# Patient Record
Sex: FEMALE | Race: WHITE | NOT HISPANIC OR LATINO | Employment: FULL TIME | URBAN - METROPOLITAN AREA
[De-identification: names, ages, dates, MRNs, and addresses within clinical notes are randomized per-mention and may not be internally consistent; named-entity substitution may affect disease eponyms.]

---

## 2016-06-20 LAB — EXTERNAL HIV SCREEN: NORMAL

## 2018-03-04 ENCOUNTER — APPOINTMENT (EMERGENCY)
Dept: RADIOLOGY | [place] HOSPITAL | Age: 53
End: 2018-03-04
Payer: COMMERCIAL

## 2018-03-04 ENCOUNTER — HOSPITAL ENCOUNTER (EMERGENCY)
[place] HOSPITAL | Age: 53
Discharge: HOME | End: 2018-03-04
Attending: EMERGENCY MEDICINE | Admitting: EMERGENCY MEDICINE
Payer: COMMERCIAL

## 2018-03-04 VITALS
BODY MASS INDEX: 24.21 KG/M2 | RESPIRATION RATE: 15 BRPM | TEMPERATURE: 98.1 F | OXYGEN SATURATION: 100 % | WEIGHT: 150 LBS | SYSTOLIC BLOOD PRESSURE: 137 MMHG | DIASTOLIC BLOOD PRESSURE: 86 MMHG | HEART RATE: 98 BPM

## 2018-03-04 DIAGNOSIS — M25.561 POSTERIOR RIGHT KNEE PAIN: Primary | ICD-10-CM

## 2018-03-04 PROCEDURE — 96372 THER/PROPH/DIAG INJ SC/IM: CPT

## 2018-03-04 PROCEDURE — 99283 EMERGENCY DEPT VISIT LOW MDM: CPT

## 2018-03-04 PROCEDURE — 73564 X-RAY EXAM KNEE 4 OR MORE: CPT

## 2018-03-04 RX ORDER — KETOROLAC TROMETHAMINE 30 MG/ML
15 INJECTION, SOLUTION INTRAMUSCULAR; INTRAVENOUS ONCE
Status: COMPLETED | OUTPATIENT
Start: 2018-03-04 | End: 2018-03-04

## 2018-03-04 RX ORDER — NAPROXEN 500 MG/1
500 TABLET ORAL 2 TIMES DAILY WITH MEALS
Qty: 10 TABLET | Refills: 0 | Status: SHIPPED | OUTPATIENT
Start: 2018-03-04 | End: 2018-04-13 | Stop reason: SDUPTHER

## 2018-03-04 RX ADMIN — KETOROLAC TROMETHAMINE 15 MG: 30 INJECTION, SOLUTION INTRAMUSCULAR at 23:39

## 2018-03-05 NOTE — ED PROVIDER NOTES
History  Chief Complaint   Patient presents with    Knee Pain     right posterior knee pain for 1 hour  denies injury  63-year-old female with no significant past history presents for evaluation of right knee pain that started 1 hour ago  Patient reports that she was at the club and was doing a Darian Croissant and when she stretched out her leg she felt as if her knee dislocated  Patient states that she physically had to bring her knee back  States that she has pain over the back of her knee that radiates down into her calf  States that she has been wearing heels and has been under Tippy toes because of the pain  Patient has not taken anything for pain or applied ice  Patient denies a history of surgery or sprain of the knee in the past   Patient states that she is able to walk however is bearing weight on her left leg  She denies any numbness, tingling, swelling, fever, chills  Prior to Admission Medications   Prescriptions Last Dose Informant Patient Reported? Taking?   dicyclomine (BENTYL) 20 mg tablet   No No   Sig: Take 1 tablet by mouth 4 (four) times a day as needed (diarrhea)      Facility-Administered Medications: None       History reviewed  No pertinent past medical history  History reviewed  No pertinent surgical history  History reviewed  No pertinent family history  I have reviewed and agree with the history as documented  Social History   Substance Use Topics    Smoking status: Former Smoker    Smokeless tobacco: Never Used    Alcohol use No        Review of Systems   Constitutional: Negative for chills and fever  Musculoskeletal: Positive for arthralgias and myalgias  Skin: Negative  Neurological: Negative for weakness and numbness         Physical Exam  ED Triage Vitals [03/04/18 2211]   Temperature Pulse Respirations Blood Pressure SpO2   98 1 °F (36 7 °C) 98 15 137/86 100 %      Temp Source Heart Rate Source Patient Position - Orthostatic VS BP Location FiO2 (%)   Oral -- Sitting Right arm --      Pain Score       --           Orthostatic Vital Signs  Vitals:    03/04/18 2211   BP: 137/86   Pulse: 98   Patient Position - Orthostatic VS: Sitting       Physical Exam   Constitutional: She is oriented to person, place, and time  She appears well-developed and well-nourished  She is cooperative  No distress  HENT:   Head: Normocephalic and atraumatic  Neck: Normal range of motion  Neck supple  Cardiovascular: Normal rate and normal heart sounds  No murmur heard  Pulmonary/Chest: Effort normal and breath sounds normal    Musculoskeletal: Normal range of motion  Right knee: She exhibits normal range of motion, no swelling, no ecchymosis, no erythema, normal alignment, no LCL laxity, normal patellar mobility, no bony tenderness, normal meniscus and no MCL laxity  No tenderness found  No medial joint line, no lateral joint line, no MCL, no LCL and no patellar tendon tenderness noted  Legs:  Full active range of motion of right knee, 5/5 strength  Tenderness to palpation over the popliteal fossa extending down into the calf  No swelling, erythema, or bony tenderness noted  No palpable mass  Patient is able to ambulate  Neurological: She is alert and oriented to person, place, and time  Skin: Skin is warm  Capillary refill takes less than 2 seconds  No rash noted  She is not diaphoretic  No erythema  Psychiatric: She has a normal mood and affect         ED Medications  Medications   ketorolac (TORADOL) injection 15 mg (15 mg Intramuscular Given 3/4/18 1213)       Diagnostic Studies  Results Reviewed     None                 XR knee 4+ views Right injury    (Results Pending)              Procedures  Procedures       Phone Contacts  ED Phone Contact    ED Course  ED Course                                MDM  Number of Diagnoses or Management Options  Diagnosis management comments: Well-appearing 66-year-old female presents for evaluation of right knee pain after trying to do a jumping Saderanjan Franks  Patient is well-appearing, vital signs not concerning  Will give her pain medication as well as get an x-ray of her knee to rule out any bony abnormality  Will provide crutches as needed  CritCare Time    Disposition  Final diagnoses:   Posterior right knee pain     Time reflects when diagnosis was documented in both MDM as applicable and the Disposition within this note     Time User Action Codes Description Comment    3/4/2018 11:39 PM Milan Esparza Add [G15 841] Posterior right knee pain       ED Disposition     ED Disposition Condition Comment    Discharge  Derrick Richter discharge to home/self care  Condition at discharge: Good        Follow-up Information     Follow up With Specialties Details Why Contact Info Additional Satish Betty [facility] Orthopedic Surgery Schedule an appointment as soon as possible for a visit in 1 day Call and follow up this week for further evaluation of knee pain  [place] [phone] [address] [facility] Emergency Department Emergency Medicine Go to If symptoms worsen such as knee joint swelling, swelling or redness of the calf  Radha Pham [address] [place] [facility], [address], [place], [address]        Patient's Medications   Discharge Prescriptions    NAPROXEN (NAPROSYN) 500 MG TABLET    Take 1 tablet (500 mg total) by mouth 2 (two) times a day with meals for 5 days       Start Date: 3/4/2018  End Date: 3/9/2018       Order Dose: 500 mg       Quantity: 10 tablet    Refills: 0     No discharge procedures on file      ED Provider  Electronically Signed by           Laith Mcdermott PA-C  03/04/18 0937

## 2018-03-05 NOTE — DISCHARGE INSTRUCTIONS

## 2018-03-06 ENCOUNTER — OFFICE VISIT (OUTPATIENT)
Dept: OBGYN CLINIC | [place] MEDICAL CENTER | Age: 53
End: 2018-03-06
Payer: COMMERCIAL

## 2018-03-06 ENCOUNTER — APPOINTMENT (OUTPATIENT)
Dept: RADIOLOGY | [place] CLINIC | Age: 53
DRG: 074 | End: 2018-03-06
Payer: COMMERCIAL

## 2018-03-06 VITALS
BODY MASS INDEX: 24.99 KG/M2 | DIASTOLIC BLOOD PRESSURE: 86 MMHG | HEIGHT: 65 IN | SYSTOLIC BLOOD PRESSURE: 124 MMHG | WEIGHT: 150 LBS | HEART RATE: 89 BPM

## 2018-03-06 DIAGNOSIS — Z01.89 ENCOUNTER FOR LOWER EXTREMITY COMPARISON IMAGING STUDY: Primary | ICD-10-CM

## 2018-03-06 DIAGNOSIS — M25.561 ACUTE PAIN OF RIGHT KNEE: ICD-10-CM

## 2018-03-06 DIAGNOSIS — G89.29 CHRONIC PAIN OF RIGHT KNEE: ICD-10-CM

## 2018-03-06 DIAGNOSIS — M25.561 CHRONIC PAIN OF RIGHT KNEE: ICD-10-CM

## 2018-03-06 PROCEDURE — 99204 OFFICE O/P NEW MOD 45 MIN: CPT | Performed by: ORTHOPAEDIC SURGERY

## 2018-03-06 PROCEDURE — 73564 X-RAY EXAM KNEE 4 OR MORE: CPT

## 2018-03-06 PROCEDURE — 73560 X-RAY EXAM OF KNEE 1 OR 2: CPT

## 2018-03-06 NOTE — PROGRESS NOTES
Assessment/Plan     1  Encounter for lower extremity comparison imaging study    2  Chronic pain of right knee    3  Acute pain of right knee      Orders Placed This Encounter   Procedures    Brace    XR knee 4+ vw right injury    XR knee 1 or 2 vw left    Ambulatory referral to Physical Therapy     Patient will continue with naproxen as well as rest, ice, elevation  She will consider a knee brace that is hinged  Using the knee immobilizer  She will go to physical therapy  She will monitor her symptoms  She will follow up in 1 week  If her pain persists we may consider right knee steroid injection and/or MRI  If she is somewhat improving we will consider continue with current plan  She will be on sedentary duty at work for now  We will re-evaluate in 1 week  Return in about 1 week (around 3/13/2018)  History of Present Illness   Chief complaint:   Chief Complaint   Patient presents with    Right Knee - Pain       HPI: Elif Hui is a 46 y o  female that c/o right knee pain  She has had pain since Sunday when she was continue line dancing and the floor was slippery  She states that her right leg slipped out from underneath her which caused her to strain her right knee  She denies falling  Most her pain is over the medial and posterior aspect of her right knee  She went to the emergency room and was given Naprosyn and a knee immobilizer  She is also given crutches  Weightbearing is somewhat difficult at this time  She admits instability  She describes her pain as dull, achy, throbbing  Overall her pain is improving  She has been resting and using the knee immobilizer  She has also been using the crutches  She denies any previous pain or problems with her right knee  She has not had injections or surgery on her right knee  She has not done physical therapy         ROS:    See HPI for musculoskeletal review, chills, numbness  All other systems reviewed are negative     Historical Information   History reviewed  No pertinent past medical history  History reviewed  No pertinent surgical history  Social History   History   Alcohol Use No     History   Drug Use No     History   Smoking Status    Former Smoker   Smokeless Tobacco    Never Used     Family History:   Family History   Problem Relation Age of Onset    Diabetes Mother     Hypertension Mother     Heart disease Mother        Meds/Allergies     (Not in a hospital admission)  Allergies   Allergen Reactions    Penicillin V Anaphylaxis    Penicillins        Objective   Vitals: Blood pressure 124/86, pulse 89, height 5' 5" (1 651 m), weight 68 kg (150 lb)  ,Body mass index is 24 96 kg/m²      PE:  AAOx 3  WDWN  Hearing intact, no drainage from eyes  Regular rate  no audible wheezing  no abdominal distension  LE compartments soft, skin intact    rightknee:    Appearance:  no swelling   No bruising  no obvious joint deformity   No effusion  Palpation/Tenderness:  +TTP over medial joint line, no TTP over lateral joint line or over patella/patellar tendon  Active Range of Motion:  AROM: full, pain with full flexion  Special Tests:  Medial Hiro's Test:  negative  Lateral Hiro's Test:  Negative  Apley's compression test:  Negative  Lachman's Test:  negative  Anterior Drawer Test:  negative  Valgus Stress Test:  negative  Varus Stress Test:  negative     No ipsilateral hip pain with ROM    Imaging Studies: I have personally reviewed pertinent films in PACS   XR R knee:  Mild medial compartment joint space narrowing

## 2018-03-06 NOTE — LETTER
March 6, 2018     Patient: Chris Sharma   YOB: 1965   Date of Visit: 3/6/2018       To Whom it May Concern:    Chris Sharma is under my professional care  She was seen in my office on 3/6/2018  She may return to work with limitations   She may work on sedentary duty only until follow up in 1 week       If you have any questions or concerns, please don't hesitate to call           Sincerely,          Jerel Ramirez DO        CC: No Recipients

## 2018-03-09 ENCOUNTER — APPOINTMENT (EMERGENCY)
Dept: CT IMAGING | [place] HOSPITAL | Age: 53
DRG: 074 | End: 2018-03-09
Payer: COMMERCIAL

## 2018-03-09 ENCOUNTER — HOSPITAL ENCOUNTER (INPATIENT)
[place] HOSPITAL | Age: 53
LOS: 1 days | Discharge: HOME | DRG: 074 | End: 2018-03-10
Attending: EMERGENCY MEDICINE | Admitting: INTERNAL MEDICINE
Payer: COMMERCIAL

## 2018-03-09 ENCOUNTER — APPOINTMENT (EMERGENCY)
Dept: RADIOLOGY | [place] HOSPITAL | Age: 53
DRG: 074 | End: 2018-03-09
Payer: COMMERCIAL

## 2018-03-09 ENCOUNTER — APPOINTMENT (INPATIENT)
Dept: MRI IMAGING | [place] HOSPITAL | Age: 53
DRG: 074 | End: 2018-03-09
Payer: COMMERCIAL

## 2018-03-09 DIAGNOSIS — G51.0 BELL'S PALSY: ICD-10-CM

## 2018-03-09 DIAGNOSIS — I63.9 STROKE (CEREBRUM) (HCC): Primary | ICD-10-CM

## 2018-03-09 DIAGNOSIS — R29.810 FACIAL DROOP: ICD-10-CM

## 2018-03-09 PROBLEM — R91.1 PULMONARY NODULE: Status: ACTIVE | Noted: 2018-03-09

## 2018-03-09 PROBLEM — R20.0 RIGHT FACIAL NUMBNESS: Status: ACTIVE | Noted: 2018-03-09

## 2018-03-09 PROBLEM — J06.9 RECENT URI: Status: ACTIVE | Noted: 2018-03-09

## 2018-03-09 PROBLEM — R47.81 SLURRED SPEECH: Status: ACTIVE | Noted: 2018-03-09

## 2018-03-09 PROBLEM — M19.90 ARTHRITIS: Status: ACTIVE | Noted: 2018-03-09

## 2018-03-09 LAB
ABO GROUP BLD: NORMAL
ANION GAP SERPL CALCULATED.3IONS-SCNC: 10 MMOL/L (ref 4–13)
APTT PPP: 31 SECONDS (ref 23–35)
ATRIAL RATE: 91 BPM
BLD GP AB SCN SERPL QL: NEGATIVE
BUN SERPL-MCNC: 20 MG/DL (ref 5–25)
CALCIUM SERPL-MCNC: 9.3 MG/DL (ref 8.3–10.1)
CHLORIDE SERPL-SCNC: 102 MMOL/L (ref 100–108)
CO2 SERPL-SCNC: 28 MMOL/L (ref 21–32)
CREAT SERPL-MCNC: 0.69 MG/DL (ref 0.6–1.3)
ERYTHROCYTE [DISTWIDTH] IN BLOOD BY AUTOMATED COUNT: 13.9 % (ref 11.6–15.1)
GFR SERPL CREATININE-BSD FRML MDRD: 100 ML/MIN/1.73SQ M
GLUCOSE SERPL-MCNC: 104 MG/DL (ref 65–140)
HCT VFR BLD AUTO: 39.3 % (ref 34.8–46.1)
HGB BLD-MCNC: 13.3 G/DL (ref 11.5–15.4)
INR PPP: 0.94 (ref 0.86–1.16)
MCH RBC QN AUTO: 28.6 PG (ref 26.8–34.3)
MCHC RBC AUTO-ENTMCNC: 33.8 G/DL (ref 31.4–37.4)
MCV RBC AUTO: 85 FL (ref 82–98)
P AXIS: 68 DEGREES
PLATELET # BLD AUTO: 261 THOUSANDS/UL (ref 149–390)
PMV BLD AUTO: 10.4 FL (ref 8.9–12.7)
POTASSIUM SERPL-SCNC: 4.2 MMOL/L (ref 3.5–5.3)
PR INTERVAL: 180 MS
PROTHROMBIN TIME: 12.6 SECONDS (ref 12.1–14.4)
QRS AXIS: 49 DEGREES
QRSD INTERVAL: 74 MS
QT INTERVAL: 328 MS
QTC INTERVAL: 403 MS
RBC # BLD AUTO: 4.65 MILLION/UL (ref 3.81–5.12)
RH BLD: POSITIVE
SODIUM SERPL-SCNC: 140 MMOL/L (ref 136–145)
SPECIMEN EXPIRATION DATE: NORMAL
T WAVE AXIS: 46 DEGREES
VENTRICULAR RATE: 91 BPM
WBC # BLD AUTO: 5.34 THOUSAND/UL (ref 4.31–10.16)

## 2018-03-09 PROCEDURE — 70498 CT ANGIOGRAPHY NECK: CPT

## 2018-03-09 PROCEDURE — 71046 X-RAY EXAM CHEST 2 VIEWS: CPT

## 2018-03-09 PROCEDURE — 85610 PROTHROMBIN TIME: CPT | Performed by: EMERGENCY MEDICINE

## 2018-03-09 PROCEDURE — 80048 BASIC METABOLIC PNL TOTAL CA: CPT | Performed by: EMERGENCY MEDICINE

## 2018-03-09 PROCEDURE — 86901 BLOOD TYPING SEROLOGIC RH(D): CPT | Performed by: EMERGENCY MEDICINE

## 2018-03-09 PROCEDURE — 99285 EMERGENCY DEPT VISIT HI MDM: CPT

## 2018-03-09 PROCEDURE — 93010 ELECTROCARDIOGRAM REPORT: CPT | Performed by: INTERNAL MEDICINE

## 2018-03-09 PROCEDURE — 85027 COMPLETE CBC AUTOMATED: CPT | Performed by: EMERGENCY MEDICINE

## 2018-03-09 PROCEDURE — 86900 BLOOD TYPING SEROLOGIC ABO: CPT | Performed by: EMERGENCY MEDICINE

## 2018-03-09 PROCEDURE — 70496 CT ANGIOGRAPHY HEAD: CPT

## 2018-03-09 PROCEDURE — 85730 THROMBOPLASTIN TIME PARTIAL: CPT | Performed by: EMERGENCY MEDICINE

## 2018-03-09 PROCEDURE — 86850 RBC ANTIBODY SCREEN: CPT | Performed by: EMERGENCY MEDICINE

## 2018-03-09 PROCEDURE — 99223 1ST HOSP IP/OBS HIGH 75: CPT | Performed by: PHYSICIAN ASSISTANT

## 2018-03-09 PROCEDURE — 36415 COLL VENOUS BLD VENIPUNCTURE: CPT | Performed by: EMERGENCY MEDICINE

## 2018-03-09 PROCEDURE — 93005 ELECTROCARDIOGRAM TRACING: CPT | Performed by: EMERGENCY MEDICINE

## 2018-03-09 RX ORDER — CLOPIDOGREL BISULFATE 75 MG/1
300 TABLET ORAL ONCE
Status: COMPLETED | OUTPATIENT
Start: 2018-03-09 | End: 2018-03-09

## 2018-03-09 RX ORDER — ASPIRIN 325 MG
325 TABLET ORAL ONCE
Status: COMPLETED | OUTPATIENT
Start: 2018-03-09 | End: 2018-03-09

## 2018-03-09 RX ORDER — ONDANSETRON 2 MG/ML
4 INJECTION INTRAMUSCULAR; INTRAVENOUS EVERY 6 HOURS PRN
Status: DISCONTINUED | OUTPATIENT
Start: 2018-03-09 | End: 2018-03-10 | Stop reason: HOSPADM

## 2018-03-09 RX ORDER — MINERAL OIL AND PETROLATUM 150; 830 MG/G; MG/G
OINTMENT OPHTHALMIC 2 TIMES DAILY
Status: DISCONTINUED | OUTPATIENT
Start: 2018-03-09 | End: 2018-03-10 | Stop reason: HOSPADM

## 2018-03-09 RX ORDER — ACETAMINOPHEN 325 MG/1
650 TABLET ORAL EVERY 4 HOURS PRN
Status: DISCONTINUED | OUTPATIENT
Start: 2018-03-09 | End: 2018-03-10 | Stop reason: HOSPADM

## 2018-03-09 RX ORDER — ASPIRIN 81 MG/1
81 TABLET, CHEWABLE ORAL DAILY
Status: DISCONTINUED | OUTPATIENT
Start: 2018-03-10 | End: 2018-03-10 | Stop reason: HOSPADM

## 2018-03-09 RX ADMIN — CLOPIDOGREL BISULFATE 300 MG: 75 TABLET ORAL at 16:43

## 2018-03-09 RX ADMIN — ASPIRIN 325 MG: 325 TABLET ORAL at 16:42

## 2018-03-09 RX ADMIN — MINERAL OIL AND WHITE PETROLATUM: 150; 830 OINTMENT OPHTHALMIC at 20:58

## 2018-03-09 RX ADMIN — IOHEXOL 85 ML: 350 INJECTION, SOLUTION INTRAVENOUS at 16:08

## 2018-03-09 NOTE — ED NOTES
Called [facility] back as no call received from admitting RN  Gave report to Lamberto Verduzco RN  03/09/18 0292

## 2018-03-09 NOTE — ED PROVIDER NOTES
History  Chief Complaint   Patient presents with    CVA/TIA-like Symptoms     pt states she woke this am around 1030am and noted her right side of her face was numb and  with a droop   pt states she was having a hard time eating last night  went to sleep with no complaints  51-year-old female presents for evaluation of moderate right-sided facial droop with right-sided altered sensation  The patient believes that her symptoms may have started last evening and she was having changes while she was eating a taco   The patient then noticed that she had a facial droop around 630 this morning and was having difficulty lifting a cup of coffee with her right hand this morning  The patient then noticed weakness in the right arm when she attempted to adjust the shower head around 11 30  The patient has also noticed that she is having decreased blinking in her right eye  The patient has had a mild associated headache on the right side        History provided by:  Patient  CVA/TIA-like Symptoms   Presenting symptoms: headaches, sensory loss and weakness    Last known well instant: last night  Onset quality:  Unable to specify  Timing:  Constant  Progression:  Unchanged  Similar to previous episodes: no    Associated symptoms: no chest pain, no facial pain, no fever, no nausea and no vomiting        Prior to Admission Medications   Prescriptions Last Dose Informant Patient Reported? Taking?   dicyclomine (BENTYL) 20 mg tablet   No No   Sig: Take 1 tablet by mouth 4 (four) times a day as needed (diarrhea)   naproxen (NAPROSYN) 500 mg tablet   No No   Sig: Take 1 tablet (500 mg total) by mouth 2 (two) times a day with meals for 5 days      Facility-Administered Medications: None       Past Medical History:   Diagnosis Date    Knee injury     right knee       History reviewed  No pertinent surgical history      Family History   Problem Relation Age of Onset    Diabetes Mother     Hypertension Mother     Heart disease Mother      I have reviewed and agree with the history as documented  Social History   Substance Use Topics    Smoking status: Former Smoker    Smokeless tobacco: Never Used    Alcohol use No        Review of Systems   Constitutional: Negative for chills and fever  Eyes: Positive for discharge  Negative for photophobia and visual disturbance  Respiratory: Negative  Cardiovascular: Negative for chest pain  Gastrointestinal: Negative for nausea and vomiting  Genitourinary: Negative  Musculoskeletal: Negative  Skin: Negative  Neurological: Positive for facial asymmetry, weakness and headaches  All other systems reviewed and are negative  Physical Exam  ED Triage Vitals [03/09/18 1500]   Temp Pulse Respirations Blood Pressure SpO2   -- (!) 108 16 141/86 98 %      Temp src Heart Rate Source Patient Position - Orthostatic VS BP Location FiO2 (%)   -- Monitor Lying Right arm --      Pain Score       No Pain           Orthostatic Vital Signs  Vitals:    03/09/18 1500   BP: 141/86   Pulse: (!) 108   Patient Position - Orthostatic VS: Lying       Physical Exam   Constitutional: She is oriented to person, place, and time  She appears well-developed and well-nourished  No distress  HENT:   Head: Normocephalic and atraumatic  Right Ear: External ear normal    Left Ear: External ear normal    Eyes: Conjunctivae and EOM are normal  Pupils are equal, round, and reactive to light  No scleral icterus  Neck: Normal range of motion  Cardiovascular: Normal rate, regular rhythm and normal heart sounds  Pulmonary/Chest: Effort normal and breath sounds normal  No respiratory distress  Abdominal: Soft  Bowel sounds are normal  There is no tenderness  There is no rebound and no guarding  Musculoskeletal: Normal range of motion  She exhibits no edema  Neurological: She is alert and oriented to person, place, and time  A sensory deficit (right face and arm) is present   Cranial nerve deficit:  right facial droop, decreased blink on right  She exhibits abnormal muscle tone (minor decreased  on right)  Coordination and gait normal  GCS eye subscore is 4  GCS verbal subscore is 5  GCS motor subscore is 6  Skin: Skin is warm and dry  No rash noted  Psychiatric: She has a normal mood and affect  Nursing note and vitals reviewed  ED Medications  Medications - No data to display    Diagnostic Studies  Results Reviewed     Procedure Component Value Units Date/Time    Basic metabolic panel [[id number]] Collected:  03/09/18 1522    Lab Status:  Final result Specimen:  Blood from Arm, Right Updated:  03/09/18 1547     Sodium 140 mmol/L      Potassium 4 2 mmol/L      Chloride 102 mmol/L      CO2 28 mmol/L      Anion Gap 10 mmol/L      BUN 20 mg/dL      Creatinine 0 69 mg/dL      Glucose 104 mg/dL      Calcium 9 3 mg/dL      eGFR 100 ml/min/1 73sq m     Narrative:         National Kidney Disease Education Program recommendations are as follows:  GFR calculation is accurate only with a steady state creatinine  Chronic Kidney disease less than 60 ml/min/1 73 sq  meters  Kidney failure less than 15 ml/min/1 73 sq  meters  APTT [[id number]]  (Normal) Collected:  03/09/18 1522    Lab Status:  Final result Specimen:  Blood from Arm, Right Updated:  03/09/18 1544     PTT 31 seconds     Narrative:          Therapeutic Heparin Range = 60-90 seconds    Protime-INR [[id number]]  (Normal) Collected:  03/09/18 1522    Lab Status:  Final result Specimen:  Blood from Arm, Right Updated:  03/09/18 1544     Protime 12 6 seconds      INR 0 94    CBC [[id number]]  (Normal) Collected:  03/09/18 1522    Lab Status:  Final result Specimen:  Blood from Arm, Right Updated:  03/09/18 1532     WBC 5 34 Thousand/uL      RBC 4 65 Million/uL      Hemoglobin 13 3 g/dL      Hematocrit 39 3 %      MCV 85 fL      MCH 28 6 pg      MCHC 33 8 g/dL      RDW 13 9 %      Platelets 011 Thousands/uL      MPV 10 4 fL                  X-ray chest 2 views    (Results Pending)   CTA head and neck w wo contrast    (Results Pending)   CT head without contrast    (Results Pending)              Procedures  ECG 12 Lead Documentation  Date/Time: 3/9/2018 3:55 PM  Performed by: Barry Castanon  Authorized by: Priscilla TONY     Indications / Diagnosis:  Stroke  ECG reviewed by me, the ED Provider: yes    Patient location:  ED  Previous ECG:     Previous ECG:  Unavailable  Interpretation:     Interpretation: normal    Rate:     ECG rate:  91    ECG rate assessment: normal    Rhythm:     Rhythm: sinus rhythm    Ectopy:     Ectopy: none    QRS:     QRS axis:  Normal    QRS intervals:  Normal  Conduction:     Conduction: normal    ST segments:     ST segments:  Normal  T waves:     T waves: normal    CriticalCare Time  Performed by: Barry Castanon  Authorized by: Priscilla TONY     Critical care provider statement:     Critical care time (minutes):  35    Critical care time was exclusive of:  Separately billable procedures and treating other patients and teaching time    Critical care was necessary to treat or prevent imminent or life-threatening deterioration of the following conditions:  CNS failure or compromise    Critical care was time spent personally by me on the following activities:  Blood draw for specimens, obtaining history from patient or surrogate, development of treatment plan with patient or surrogate, discussions with consultants, evaluation of patient's response to treatment, examination of patient, ordering and performing treatments and interventions, ordering and review of laboratory studies, ordering and review of radiographic studies, re-evaluation of patient's condition, review of old charts and interpretation of cardiac output measurements    I assumed direction of critical care for this patient from another provider in my specialty: no             Phone Contacts  ED Phone Contact    ED Course  ED Course as of Mar 09 1638   Fri Mar 09, 2018   1521 Call placed to Neuro    1542 D/W Dr Tom Heath who agreed with ED workup and plan  Agreed with patient not being a TPA candidate due to prolongaed duration of symptoms prior to presentation and low NIH stroke score  Advised CTA head and neck  w/wo contrast then if no bleed plavix 300mg load then 75 mg daily  ASA 325mg   Then MRI w/wo contrast    1630 D/W Dr Isidro Martínez (rads) no bleed, no large occlusion              NIH Stroke Scale    Flowsheet Row Most Recent Value   Level of Consciousness (1a )  0 Filed at: 03/09/2018 1521   LOC Questions (1b )  0 Filed at: 03/09/2018 1521   LOC Commands (1c )  0 Filed at: 03/09/2018 1521   Best Gaze (2 )  0 Filed at: 03/09/2018 1521   Visual (3 )  0 Filed at: 03/09/2018 1521   Facial Palsy (4 )  1 Filed at: 03/09/2018 1521   Motor Arm, Left (5a )  0 Filed at: 03/09/2018 1521   Motor Arm, Right (5b )  0 Filed at: 03/09/2018 1521   Motor Leg, Left (6a )  0 Filed at: 03/09/2018 1521   Motor Leg, Right (6b )  0 Filed at: 03/09/2018 1521   Limb Ataxia (7 )  0 Filed at: 03/09/2018 1521   Sensory (8 )  1 Filed at: 03/09/2018 1521   Best Language (9 )  0 Filed at: 03/09/2018 1521   Dysarthria (10 )  0 Filed at: 03/09/2018 1521   Extinction and Inattention (11 ) (Formerly Neglect)  0 Filed at: 03/09/2018 1521   Total  2 Filed at: 03/09/2018 1521                        [facility]  Number of Diagnoses or Management Options  [facility] new and requires workup     Amount and/or Complexity of Data Reviewed  Clinical lab tests: ordered and reviewed  Tests in the radiology section of CPT®: ordered and reviewed  Tests in the medicine section of CPT®: ordered and reviewed  Discuss the patient with other providers: yes  Independent visualization of images, tracings, or specimens: yes      CritCare Time    Disposition  Final diagnoses:   Stroke (cerebrum) ([facility] )     Time reflects when diagnosis was documented in both MDM as applicable and the Disposition within this note     Time User Action Codes Description Comment    3/9/2018  3:17 PM Gay Laughlin Add [I63 9] Stroke (cerebrum) [facility])       ED Disposition     None      Follow-up Information    None       Patient's Medications   Discharge Prescriptions    No medications on file     No discharge procedures on file      ED Provider  Electronically Signed by           Darrel Ordaz DO  03/09/18 5540

## 2018-03-09 NOTE — ED NOTES
Pt states today at home she drank coffee and a protein shake, and she took her vitamins  Pt states she never felt like she was choking, but states it was difficult to swallow because she could not feel the right side of her face        Lisa Khoury RN  03/09/18 9689

## 2018-03-09 NOTE — ED NOTES
[facility] to give care handoff  Nurse states he is discharging another pt currently and will call back        Fredy Munoz, ELISA  03/09/18 8116

## 2018-03-09 NOTE — ED NOTES
Gave pt water to sip, pt states she forces the water (and everything else she drank today) to the left side of her mouth, and is able to swallow  Pt denies feeling of anything"stuck" in her throat         Lani Verduzco RN  03/09/18 7690

## 2018-03-10 ENCOUNTER — APPOINTMENT (INPATIENT)
Dept: MRI IMAGING | [place] HOSPITAL | Age: 53
DRG: 074 | End: 2018-03-10
Payer: COMMERCIAL

## 2018-03-10 VITALS
RESPIRATION RATE: 18 BRPM | BODY MASS INDEX: 29.83 KG/M2 | HEART RATE: 102 BPM | WEIGHT: 179.01 LBS | HEIGHT: 65 IN | DIASTOLIC BLOOD PRESSURE: 85 MMHG | SYSTOLIC BLOOD PRESSURE: 121 MMHG | TEMPERATURE: 98.1 F | OXYGEN SATURATION: 94 %

## 2018-03-10 LAB
ANION GAP SERPL CALCULATED.3IONS-SCNC: 6 MMOL/L (ref 4–13)
BUN SERPL-MCNC: 18 MG/DL (ref 5–25)
CALCIUM SERPL-MCNC: 9.4 MG/DL (ref 8.3–10.1)
CHLORIDE SERPL-SCNC: 102 MMOL/L (ref 100–108)
CO2 SERPL-SCNC: 32 MMOL/L (ref 21–32)
CREAT SERPL-MCNC: 0.87 MG/DL (ref 0.6–1.3)
ERYTHROCYTE [DISTWIDTH] IN BLOOD BY AUTOMATED COUNT: 14.2 % (ref 11.6–15.1)
EST. AVERAGE GLUCOSE BLD GHB EST-MCNC: 105 MG/DL
GFR SERPL CREATININE-BSD FRML MDRD: 77 ML/MIN/1.73SQ M
GLUCOSE SERPL-MCNC: 96 MG/DL (ref 65–140)
HBA1C MFR BLD: 5.3 % (ref 4.2–6.3)
HCT VFR BLD AUTO: 37.9 % (ref 34.8–46.1)
HGB BLD-MCNC: 12.7 G/DL (ref 11.5–15.4)
MCH RBC QN AUTO: 28.5 PG (ref 26.8–34.3)
MCHC RBC AUTO-ENTMCNC: 33.5 G/DL (ref 31.4–37.4)
MCV RBC AUTO: 85 FL (ref 82–98)
PLATELET # BLD AUTO: 234 THOUSANDS/UL (ref 149–390)
PMV BLD AUTO: 10.5 FL (ref 8.9–12.7)
POTASSIUM SERPL-SCNC: 4.2 MMOL/L (ref 3.5–5.3)
RBC # BLD AUTO: 4.46 MILLION/UL (ref 3.81–5.12)
SODIUM SERPL-SCNC: 140 MMOL/L (ref 136–145)
WBC # BLD AUTO: 5.24 THOUSAND/UL (ref 4.31–10.16)

## 2018-03-10 PROCEDURE — 70553 MRI BRAIN STEM W/O & W/DYE: CPT

## 2018-03-10 PROCEDURE — 86618 LYME DISEASE ANTIBODY: CPT | Performed by: INTERNAL MEDICINE

## 2018-03-10 PROCEDURE — 92610 EVALUATE SWALLOWING FUNCTION: CPT

## 2018-03-10 PROCEDURE — A9585 GADOBUTROL INJECTION: HCPCS | Performed by: PHYSICIAN ASSISTANT

## 2018-03-10 PROCEDURE — 80048 BASIC METABOLIC PNL TOTAL CA: CPT | Performed by: PHYSICIAN ASSISTANT

## 2018-03-10 PROCEDURE — 99239 HOSP IP/OBS DSCHRG MGMT >30: CPT | Performed by: INTERNAL MEDICINE

## 2018-03-10 PROCEDURE — 85027 COMPLETE CBC AUTOMATED: CPT | Performed by: PHYSICIAN ASSISTANT

## 2018-03-10 PROCEDURE — 83036 HEMOGLOBIN GLYCOSYLATED A1C: CPT | Performed by: PHYSICIAN ASSISTANT

## 2018-03-10 PROCEDURE — 99254 IP/OBS CNSLTJ NEW/EST MOD 60: CPT | Performed by: PSYCHIATRY & NEUROLOGY

## 2018-03-10 RX ORDER — PREDNISONE 10 MG/1
60 TABLET ORAL DAILY
Qty: 42 TABLET | Refills: 0 | Status: SHIPPED | OUTPATIENT
Start: 2018-03-10 | End: 2018-03-17

## 2018-03-10 RX ADMIN — ACETAMINOPHEN 650 MG: 325 TABLET, FILM COATED ORAL at 12:53

## 2018-03-10 RX ADMIN — GADOBUTROL 8 ML: 604.72 INJECTION INTRAVENOUS at 10:05

## 2018-03-10 RX ADMIN — MINERAL OIL AND WHITE PETROLATUM: 150; 830 OINTMENT OPHTHALMIC at 12:52

## 2018-03-10 RX ADMIN — ENOXAPARIN SODIUM 40 MG: 40 INJECTION SUBCUTANEOUS at 12:53

## 2018-03-10 RX ADMIN — ASPIRIN 81 MG 81 MG: 81 TABLET ORAL at 12:52
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
## 2018-03-10 NOTE — PROGRESS NOTES
Pt failed dysphagia evaluation; paged Eloise Schaeffer regarding same  New order given for NPO and speech consult  Will continue to monitor

## 2018-03-10 NOTE — PROGRESS NOTES
Patient rang her bell previously and reported that she couldn't breath right and "air was not passing"  Assessed the patient for clear lung sounds, clear airway and oxygen sats at 94-96% on room air  Patient was not in any distress  Called SLIM admitting to inform her of patient's complaint  SLIM PA suggested adding oxygen on 1-2 L then continuing to monitor  This RN explained what the PA said and then explained that vitals are stable and how patient is showing no signs of poor perfusion or respiratory distress  Patient stated "I may show no signs of anything wrong, but something is wrong and I just wanted to tell you before I pass out or something worse happens"  Educated patient that this RN will be continuously monitoring throughout the night and will report any future complaints to the provider

## 2018-03-10 NOTE — NURSING NOTE
RN went over discharge instructions, medications and follow up appointments  Prescription described and sent home with patient

## 2018-03-10 NOTE — PROGRESS NOTES
RN discussed diet with pt  Pt ordered lunch  RN asked if there is anything else she can do for the pt  Pt  Stated there is nothing she needs at the moment  Will follow up

## 2018-03-10 NOTE — PLAN OF CARE
Problem: SLP ADULT - SWALLOWING, IMPAIRED  Goal: Initial SLP swallow eval performed  Outcome: Completed Date Met: 03/10/18

## 2018-03-10 NOTE — PROGRESS NOTES
Pt wanted to lock belongings with security prior to going for her MRI  Pt then stated that she wanted to have her belongings brought back to her when she returns, Then pt wanted security to come and watch her room or some one to watch her room for her while she is at testing  RN educated pt that we do not have a security watch room as they are to be available throughout the hospital for emergencies  Also educated pt that we will put sign on her door for all visistors to see nursing prior to entering her room  Pt was ok with that  RN made sign and placed on pt's door prior to leaving for MRI    Pt feels her "firend will take her things while she is gone to her test, because he left here mad last night"

## 2018-03-10 NOTE — PROGRESS NOTES
Progress Note -  Internal Medicine / Hospitalists  Florinakrysta Noyola 46 y o  female MRN: [id number]  Unit/Bed#: E4 -01 Encounter: [id number]      ASSESSMENT AND PLAN:  RIGHT SIDED FACIAL DROOP/SLURRED SPEECH WITH FACIAL NUMBNESS  CVA work up underway  Continue asa  Speech pathology upgraded to dysphagia 3 diet  Check lymes titer  PTOT  LEFT UPPER LOBE PULMONARY NODULE   7 mm left upper lobe pulmonary nodule  Based on current Fleischner Society 2017 Guidelines on incidental pulmonary nodule, followup non-contrast CT is recommended at 6-12 months from the initial examination and, if stable at that time, an additional   followup is recommended for 18-24 months from the initial examination  Updated her of the test   Outpatient follow up  R  KNEE ARTHRITIS  patient known to ortho as an outpt  She is currently wearing a knee brace for recent fall/injury to her right knee  Imaging was obtained as an outpatient with no Acute fractures  ______________________________________________________________________    SUBJECTIVE:   Patient seen and examined  Concerned that she has not had any breakfast, explained to her regarding dysphagia diet  Updated her regarding incidental finding of lung nodule      OBJECTIVE:   Vitals:   HR:  [] 83  Resp:  [16-22] 18  BP: ()/(66-96) 99/66  SpO2:  [94 %-100 %] 94 %  Temp (24hrs), Av 9 °F (36 6 °C), Min:97 1 °F (36 2 °C), Max:98 3 °F (36 8 °C)  Current: Temperature: 98 1 °F (36 7 °C)  No intake or output data in the 24 hours ending 03/10/18 1142    Physical Exam:     General Appearance:    Alert, cooperative, no distress   Head:    Normocephalic without obvious abnormality   Eyes:    Anicteric sclerae,  EOM's intact        Neck:   Supple, no adenopathy, no JVD   Back:     Symmetric, no spinal or CVA tenderness   Lungs:     Clear to auscultation bilaterally, no wheezing or rhonchi   Heart:    Regular rate and rhythm, S1 and S2 normal, no murmur   Abdomen:     Soft, non-tender, bowel sounds active    Extremities:   Extremities normal  No clubbing, cyanosis or edema   Psych:   Normal Affect   Neurologic:   Awake, follows commands  Right facial droop  Lab, Imaging and other studies:  Results for Yves Renteria (MRN [id number]) as of 3/10/2018 11:50   3/10/2018 05:07   eGFR 77   Sodium 140   Potassium 4 2   Chloride 102   CO2 32   Anion Gap 6   BUN 18   Creatinine 0 87   Glucose 96   Calcium 9 4   WBC 5 24   RBC 4 46   Hemoglobin 12 7   Hematocrit 37 9   MCV 85   MCH 28 5   MCHC 33 5   RDW 14 2   Platelets 283   Hemoglobin A1C 5 3        Scheduled Meds:    Current Facility-Administered Medications:  acetaminophen 650 mg Oral Q4H PRN [employer], PA-C   artificial tear  Right Eye BID [employer] PA-ALDO   aspirin 81 mg Oral Daily Fabiana Alatorre PA-ALDO   enoxaparin 40 mg Subcutaneous Daily Fabiana Alatorre PA-ALDO   ondansetron 4 mg Intravenous Q6H PRN [employer] PA-ALDO     PRN Meds:    acetaminophen    ondansetron      VTE Prophylaxis: Connie Brandon MD  HOSPITALIST SERVICES

## 2018-03-10 NOTE — PROGRESS NOTES
Radiology called to take patient down for her MRI  Patient was told about the exam, but preferred to not go this night because she wanted her daughter to visit  Explained this to MRI  Patient will go for MRI tomorrow instead

## 2018-03-10 NOTE — PLAN OF CARE
Activity Intolerance/Impaired Mobility     Mobility/activity is maintained at optimum level for patient Progressing        Communication Impairment     Ability to express needs and understand communication [address] Discharge to home or other facility with appropriate resources Progressing        Knowledge Deficit     Patient/family/caregiver demonstrates understanding of disease process, treatment plan, medications, and discharge instructions Progressing        Neurological Deficit     Neurological status is stable or improving Progressing        Nutrition     Nutrition/Hydration status is improving Progressing        Potential for Aspiration     Non-ventilated patient's risk of aspiration is minimized Progressing

## 2018-03-10 NOTE — H&P
History and Physical - [facility] Internal Medicine    Patient Information: Kimberly Betancourt 46 y o  female MRN: [id number]  Unit/Bed#: E4 -01 Encounter: [id number]  Admitting Physician: Jamir Grimes PA-C  PCP: Mora Allen,   Date of Admission:  03/09/18    Assessment/Plan:    Hospital Problem List:     Principal Problem:    Facial droop  Active Problems:    Slurred speech    Recent URI    Arthritis right knee    Pulmonary nodule    Right facial numbness      Primary Problem(s):  · Right-sided facial droop / right-sided facial numbness / slurred speech  · Also with right eye tearing, decreased blinking, and difficulty closing right eye  · Onset of symptoms occurred last night around 5:00 p m  while eating dinner  Pt went to bed and awoke with persistence of symptoms which caused her to present to the ER  · Will rule out acute ischemia event but there is a suspicion for Bell's palsy given recent viral illness approximately 1 month ago along with exam finding of pt not being able to raise her right eyebrow  · CTA of head and neck with and without contrast revealed no hemodynamically significant stenosis in the major arteries of the neck  No intracranial hemorrhage or major intracranial arterial stenosis  No acute intracranial hemorrhage  · ED discussed the case with Dr Shawna Zamora who advised if there is no bleed to load with Plavix 300 mg and then 75 mg daily  · Received aspirin bolus 325 mg in ED  Place on 81 mg aspirin daily  · Lipid panel from [facility] done on 2/19/18 with cholesterol 174, triglycerides 149, HDL 53, LDL 91   · Hold off on starting statin at this time in the setting of lipid panel WNL and suspicion for bells palsy  Will defer to neurology if statin is further indicated  · Will check hemoglobin A1c given none recent    · Obtain MRI brain with and without contrast, echocardiogram   · Monitor on telemetry and neuro checks  · Consult Neurology    Additional Problems:   · Recent URI:  Proximally want a month ago  Patient saw PCP at that time and was told she had a viral infection  Symptoms subsided and she gradually improved  · Incidental finding #1:  Pulmonary nodule: 7 mm left upper lobe pulmonary nodule  Will need outpatient follow-up and surveillance  Patient is not a smoker  · Right shin numbness and tingling:  Patient with history of this and previously presented to [facility] on 2/20/18 it was determined that patient had frostbite at that time  Patient notes she had an episode of numbness and tingling today but this has resolved  Patient is on a stroke pathway  · Right knee arthritis: pt known to ortho as an outpt  She is currently wearing a knee brace for recent fall/injury to her right knee  Imaging was obtained as an outpatient with no acute fracture  VTE Prophylaxis: Enoxaparin (Lovenox)  / sequential compression device   Code Status:  Full code  Anticipated Length of Stay:  Patient will be admitted on an Inpatient basis with an anticipated length of stay of  greater than 2 midnights  Justification for Hospital Stay:  Stroke pathway    Chief Complaint:   Slurred speech / right-sided facial droop and numbness    History of Present Illness:    Uvaldo Albrecht is a 46 y o  female with past medical history only significant for arthritis and PID  She presents with the complaint of a numbness and tingling to the right side of her face  Onset was around 5:00 p m  last night while eating a taco   Patient states she noticed this weird feeling and a pressure sensation in the right temple area as well  She decided to go to bed and did not tell her fiancee about the symptoms  Around 10:00 a m  when they were drinking coffee, patient noticed her right side of her face was drooping and she was unable to smile  The numbness and tingling on the right side of her face has persisted and progressed to her neck  She was also slurring her words at that time because her tongue felt heavy    When salvatore noticed these symptoms, he decided they were coming to the hospital   Additionally, patient notes that her right eye feels heavy  Decreased blinking to right eye as will as increased tearing  Admits to blurry vision in her right eye  Denies any double vision or loss of vision  Denies any weakness in 1 extremity greater than the other  Denies any difficulty swallowing or painful swallowing  Denies any chest pain, chest pressure, palpitations  Patient denies a history of diabetes, hypertension, hyperlipidemia, history of MI, CVA, TIA  Positive family history of mom with heart disease, hypertension and diabetes  Patient denies smoking cigarettes, drinking alcohol, or other drug use  Review of Systems:    General:   No Fever or chills;    EENT:   No ear pain, facial swelling; + right facial droop   Skin:   No rashes, color changes  Respiratory:     No shortness of breath, cough, wheezing, stridor  Cardiovascular:     No chest pain, palpitations  Gastrointestinal:    No nausea, vomiting, diarrhea; No abdominal pain  Musculoskeletal:     No arthralgias, myalgias, swelling  Neurologic:   No dizziness, + numbness, weakness  + speech difficulties  Psych:   No agitation,     Otherwise, All other twelve-point review of systems normal      Past Medical and Surgical History:     Past Medical History:   Diagnosis Date    Knee injury     right knee       History reviewed  No pertinent surgical history      Meds/Allergies:    Current Facility-Administered Medications   Medication Dose Route Frequency Provider Last Rate Last Dose    acetaminophen (TYLENOL) tablet 650 mg  650 mg Oral Q4H PRN Sherry Altman PA-C        artificial tear (LUBRIFRESH P M ) ophthalmic ointment   Right Eye BID Fabiana Alatorre PA-C        [START ON 3/10/2018] aspirin chewable tablet 81 mg  81 mg Oral Daily Fabiana Alatorre PA-C        [START ON 3/10/2018] enoxaparin (LOVENOX) subcutaneous injection 40 mg  40 mg Subcutaneous Daily Fabiana Alatorre PA-C        ondansetron [facility] injection 4 mg  4 mg Intravenous Q6H PRN Laney Blum PA-C           Allergies   Allergen Reactions    Penicillin V Anaphylaxis    Penicillins        Allergies: Allergies   Allergen Reactions    Penicillin V Anaphylaxis    Penicillins        Social History:     Marital Status: /Civil Union     Substance Use History:   History   Alcohol Use No     History   Smoking Status    Former Smoker   Smokeless Tobacco    Never Used     History   Drug Use No       Family History:    non-contributory    Physical Exam:     Vitals:   Blood Pressure: 135/96 (03/09/18 2030)  Pulse: 95 (03/09/18 2030)  Temperature: 98 1 °F (36 7 °C) (03/09/18 2030)  Temp Source: Temporal (03/09/18 2030)  Respirations: 22 (03/09/18 2030)  Height: 5' 5" (165 1 cm) (03/09/18 1521)  Weight - Scale: 81 2 kg (179 lb 0 2 oz) (03/09/18 1521)  SpO2: 100 % (03/09/18 2030)    Physical Exam   Constitutional: She is oriented to person, place, and time  She appears well-developed and well-nourished  No distress  HENT:   Head: Normocephalic and atraumatic  Eyes: Conjunctivae and EOM are normal  Pupils are equal, round, and reactive to light  Neck: Neck supple  Cardiovascular: Normal rate, regular rhythm and normal heart sounds  Pulmonary/Chest: Effort normal and breath sounds normal  No respiratory distress  She has no wheezes  She has no rales  She exhibits no tenderness  Abdominal: Soft  Bowel sounds are normal  She exhibits no distension and no mass  There is no tenderness  There is no rebound and no guarding  Musculoskeletal: She exhibits no edema  Neurological: She is alert and oriented to person, place, and time  She has normal strength  She displays no atrophy and no tremor  A cranial nerve deficit and sensory deficit is present  She exhibits normal muscle tone  She displays no seizure activity   Coordination normal    Right-sided eye droop, right-sided mouth droop, decreased sensation to right side of face and neck  Patient is unable to raise right eyebrow  Inability to smile symmetrically  Equal upper motor and lower motor strength bilaterally  No sensory deficit to upper extremities or lower extremities bilaterally  No pronator drift  Pupils equal round reactive to light  Fine motor coordination skills finger to thumb and finger to nose intact  Heel-to-shin intact  Equal plantar flexion and dorsiflexion of feet bilaterally   Skin: Skin is warm and dry  She is not diaphoretic  Psychiatric: She has a normal mood and affect  Her behavior is normal    Nursing note and vitals reviewed  Additional Data:     Lab Results: I have personally reviewed pertinent reports  Results from last 7 days  Lab Units 03/09/18  1522   WBC Thousand/uL 5 34   HEMOGLOBIN g/dL 13 3   HEMATOCRIT % 39 3   PLATELETS Thousands/uL 261       Results from last 7 days  Lab Units 03/09/18  1522   SODIUM mmol/L 140   POTASSIUM mmol/L 4 2   CHLORIDE mmol/L 102   CO2 mmol/L 28   BUN mg/dL 20   CREATININE mg/dL 0 69   CALCIUM mg/dL 9 3   GLUCOSE RANDOM mg/dL 104       Results from last 7 days  Lab Units 03/09/18  1522   INR  0 94       Imaging: I have personally reviewed pertinent reports  Cta Head And Neck W Wo Contrast    Result Date: 3/9/2018  Narrative: CTA NECK AND BRAIN WITH AND WITHOUT CONTRAST INDICATION: CVA/TIA-like Symptoms (pt states she woke this am around 1030am and noted her right side of her face was numb and with a droop   pt states she was having a hard time eating last night  went to sleep with no complaints  ) COMPARISON:   7/14/2014 TECHNIQUE:  Routine CT imaging of the Brain without contrast   Post contrast imaging was performed after administration of iodinated contrast through the neck and brain  Post contrast axial 0 625 mm images timed to opacify the arterial system  3D rendering was performed on an independent workstation     MIP reconstructions performed  Coronal reconstructions were performed of the noncontrast portion of the brain  Radiation dose length product (DLP) for this visit:  1337 mGy-cm   This examination, like all CT scans performed in the [facility], was performed utilizing techniques to minimize radiation dose exposure, including the use of iterative reconstruction and automated exposure control  IV Contrast:  85 mL of iohexol (OMNIPAQUE)  IMAGE QUALITY:   Diagnostic FINDINGS: NONCONTRAST BRAIN PARENCHYMA:  No intracranial mass, mass effect or midline shift  No acute intracranial hemorrhage  No CT signs of acute infarction  VENTRICLES AND EXTRA-AXIAL SPACES:  Normal for patient's age  VISUALIZED ORBITS AND PARANASAL SINUSES:  Orbits appear normal   Mild scattered sinus mucosal thickening is noted  No fluid levels are seen  CALVARIUM AND EXTRACRANIAL SOFT TISSUES:   Normal  CERVICAL VASCULATURE AORTIC ARCH AND GREAT VESSELS: There is variant branching anatomy of the great vessels with a common trunk for the brachiocephalic and left common carotid arteries, a so-called bovine aortic arch  RIGHT VERTEBRAL ARTERY CERVICAL SEGMENT:  Normal origin  The vessel is normal in caliber throughout the neck  LEFT VERTEBRAL ARTERY CERVICAL SEGMENT:  Normal origin  The vessel is normal in caliber throughout the neck  RIGHT EXTRACRANIAL CAROTID SEGMENT:  Normal caliber common carotid artery  Normal bifurcation and cervical internal carotid artery  No stenosis or dissection  LEFT EXTRACRANIAL CAROTID SEGMENT:  Normal caliber common carotid artery  Normal bifurcation and cervical internal carotid artery  No stenosis or dissection  NASCET criteria was used to determine the degree of internal carotid artery diameter stenosis  INTRACRANIAL VASCULATURE INTERNAL CAROTID ARTERIES:  Normal enhancement of the intracranial portions of the internal carotid arteries  Normal ophthalmic artery origins  Normal ICA terminus   ANTERIOR CIRCULATION: Symmetric A1 segments and anterior cerebral arteries with normal enhancement  Normal anterior communicating artery  MIDDLE CEREBRAL ARTERY CIRCULATION:  M1 segment and middle cerebral artery branches demonstrate normal enhancement bilaterally  DISTAL VERTEBRAL ARTERIES:  Normal distal vertebral arteries  Posterior inferior cerebellar artery origins are normal  Normal vertebral basilar junction  BASILAR ARTERY:  Basilar artery is normal in caliber  Normal superior cerebellar arteries  POSTERIOR CEREBRAL ARTERIES: Both posterior cerebral arteries arise from the internal carotid arteries consistent with a fetal origin  No focal stenosis is identified  Normal posterior communicating arteries  DURAL VENOUS SINUSES:  Normal  NON VASCULAR ANATOMY BONY STRUCTURES:  No acute osseous abnormality  SOFT TISSUES OF THE NECK:  Unremarkable  THORACIC INLET:  7 mm pulmonary nodule medially in the left upper lobe adjacent to the aortic arch image 261, series 4  Pleural thickening left upper lung posteriorly        Impression: 1  No hemodynamically significant stenosis in the major arteries of the neck  2   No intracranial aneurysm or major intracranial arterial stenosis  3   No acute intracranial hemorrhage  4   7 mm left upper lobe pulmonary nodule  Based on current Fleischner Society 2017 Guidelines on incidental pulmonary nodule, followup non-contrast CT is recommended at 6-12 months from the initial examination and, if stable at that time, an additional  followup is recommended for 18-24 months from the initial examination  I personally discussed this study with Peggy Leigh on 3/9/2018 at 4:28 PM   Specific recommendations regarding the 7 mm left upper lobe pulmonary nodule were communicated to the emergency room physician on duty at this time as Dr Jayde Gibson left at the end of his shift    Dr Amarjit Fowler understands the incidental finding of a  7 mm left upper lobe pulmonary nodule and the need for follow-up imaging  I personally discussed this study with Mora Turner on 3/9/2018 at 4:44 PM  Workstation performed: [id number]     X-ray Chest 2 Views    Result Date: 3/9/2018  Narrative: CHEST INDICATION:  Mid chest pressure and discomfort x2 days  Shortness of breath   stroke COMPARISON:  5/8/2014 EXAM PERFORMED/VIEWS:  XR CHEST PA & LATERAL  The frontal view was performed utilizing dual energy radiographic technique  Images: 4 FINDINGS: Cardiomediastinal silhouette appears unremarkable  The lungs are clear  No pneumothorax or pleural effusion  Osseous structures appear within normal limits for patient age  Impression: No acute cardiopulmonary disease  Workstation performed: [id number]     Xr Knee 1 Or 2 Vw Left    Result Date: 3/7/2018  Narrative: LEFT KNEE INDICATION:  Encounter for other specified special examinations  COMPARISON: None VIEWS:  AP bilateral projection IMAGES:  1 FINDINGS: There is no acute fracture or dislocation  Joint effusion cannot be reliably evaluated on this single projection  No significant degenerative changes  No lytic or blastic lesions are seen  Soft tissues are unremarkable  The right knee will be reported separately  Impression: Unremarkable single view left knee  Workstation performed: [id number]     Xr Knee 4+ Vw Right Injury    Result Date: 3/7/2018  Narrative: RIGHT KNEE INDICATION:  Posterior right knee pain, unable to bear weight  Patient fell 3 days ago  COMPARISON: 3/4/2018 VIEWS:  4 IMAGES:  4 FINDINGS: There is no acute fracture or dislocation  There is no joint effusion  No significant degenerative changes  No lytic or blastic lesions are seen  Soft tissues are unremarkable  The left knee will be reported separately  Impression: No acute osseous abnormality  Workstation performed: [id number]     Xr Knee 4+ Views Right Injury    Result Date: 3/5/2018  Narrative: RIGHT KNEE INDICATION: pain post jumping mehran   History taken directly from the electronic ordering system  COMPARISON: None  VIEWS:  4 IMAGES:  4 FINDINGS: There is no acute fracture or dislocation  There is no joint effusion  No significant degenerative changes  No lytic or blastic lesions are seen  Soft tissues are unremarkable  Impression: No acute osseous abnormality  Workstation performed: [id number]       EKG, Pathology, and Other Studies Reviewed on Admission:   · EK normal sinus rhythm    [employer] Records Reviewed: Yes     Total Time for Visit, including Counseling / Coordination of Care: 45 minutes  Greater than 50% of this total time spent on direct patient counseling and coordination of care  ** Please Note: This note has been constructed using a voice recognition system   **

## 2018-03-10 NOTE — PROGRESS NOTES
RN informed MD that pt's abx was running and the lab  Work had not been obtained    Orders to obtain in am

## 2018-03-10 NOTE — DISCHARGE SUMMARY
[address] 46 y o  female   MRN: [id number]   Encounter: [id number]   Unit/Bed: E4 -01     Admitting Provider:  Luis Lopez MD  Discharge Provider:  Negro Lorenzana MD  Admission Date: 3/9/2018       Discharge Date: 03/10/18   LOS: 1  Primary Care Physician at Discharge: Deena JohnsDO [phone]    DISCHARGE DIAGNOSES  Principal Problem:    Facial droop secondary to [place] Palsy  (Acute CVA has been ruled out)  Active Problems:    Slurred speech    Recent URI    Arthritis right knee    Pulmonary nodule    Right facial numbness  Resolved Problems:    * No resolved hospital problems  *    HOSPITAL COURSE:  Myrtle Noyola is a 46 y o  female with past medical history only significant for arthritis and PID     She presents with the complaint of a numbness and tingling to the right side of her face  Onset was around 5:00 p m  last night while eating a taco   Patient states she noticed this weird feeling and a pressure sensation in the right temple area as well  She decided to go to bed and did not tell her fiancee about the symptoms  Around 10:00 a m  when they were drinking coffee, patient noticed her right side of her face was drooping and she was unable to smile  The numbness and tingling on the right side of her face has persisted and progressed to her neck  She was also slurring her words at that time because her tongue felt heavy  When fiancee noticed these symptoms, he decided they were coming to the hospital   Additionally, patient notes that her right eye feels heavy  Decreased blinking to right eye as will as increased tearing  Admits to blurry vision in her right eye  Denies any double vision or loss of vision  Denies any weakness in 1 extremity greater than the other  Denies any difficulty swallowing or painful swallowing  Denies any chest pain, chest pressure, palpitations    Patient denies a history of diabetes, hypertension, hyperlipidemia, history of MI, CVA, TIA  Positive family history of mom with heart disease, hypertension and diabetes  Patient denies smoking cigarettes, drinking alcohol, or other drug use  CVA workup was done and no acute ischemia/CVA identified  Her symptoms secondary to [place] Palsy  Neurology saw patient and recommended oral prednisone 60 mg daily for 7 days  Lymes titer has been sent, this will need to be followed up by PCP  Patient was found to have 7 mm left upper lobe pulmonary nodule  Based on current Fleischner Society 2017 Guidelines on incidental pulmonary nodule, followup non-contrast CT is recommended at 6-12 months from the initial examination and, if stable at that time, an additionalfollowup is recommended for 18-24 months from the initial examination  Updated her of the test   Outpatient follow up  Patient is stable to be discharged home  Fredrick Soriano   Neurology  PROCEDURES PERFORMED  Cta Head And Neck W Wo Contrast    Result Date: 3/9/2018  Narrative: CTA NECK AND BRAIN WITH AND WITHOUT CONTRAST INDICATION: CVA/TIA-like Symptoms (pt states she woke this am around 1030am and noted her right side of her face was numb and with a droop   pt states she was having a hard time eating last night  went to sleep with no complaints  ) COMPARISON:   7/14/2014 TECHNIQUE:  Routine CT imaging of the Brain without contrast   Post contrast imaging was performed after administration of iodinated contrast through the neck and brain  Post contrast axial 0 625 mm images timed to opacify the arterial system  3D rendering was performed on an independent workstation  MIP reconstructions performed  Coronal reconstructions were performed of the noncontrast portion of the brain  Radiation dose length product (DLP) for this visit:  1337 mGy-cm     This examination, like all CT scans performed in the [facility], was performed utilizing techniques to minimize radiation dose exposure, including the use of iterative reconstruction and automated exposure control  IV Contrast:  85 mL of iohexol (OMNIPAQUE)  IMAGE QUALITY:   Diagnostic FINDINGS: NONCONTRAST BRAIN PARENCHYMA:  No intracranial mass, mass effect or midline shift  No acute intracranial hemorrhage  No CT signs of acute infarction  VENTRICLES AND EXTRA-AXIAL SPACES:  Normal for patient's age  VISUALIZED ORBITS AND PARANASAL SINUSES:  Orbits appear normal   Mild scattered sinus mucosal thickening is noted  No fluid levels are seen  CALVARIUM AND EXTRACRANIAL SOFT TISSUES:   Normal  CERVICAL VASCULATURE AORTIC ARCH AND GREAT VESSELS: There is variant branching anatomy of the great vessels with a common trunk for the brachiocephalic and left common carotid arteries, a so-called bovine aortic arch  RIGHT VERTEBRAL ARTERY CERVICAL SEGMENT:  Normal origin  The vessel is normal in caliber throughout the neck  LEFT VERTEBRAL ARTERY CERVICAL SEGMENT:  Normal origin  The vessel is normal in caliber throughout the neck  RIGHT EXTRACRANIAL CAROTID SEGMENT:  Normal caliber common carotid artery  Normal bifurcation and cervical internal carotid artery  No stenosis or dissection  LEFT EXTRACRANIAL CAROTID SEGMENT:  Normal caliber common carotid artery  Normal bifurcation and cervical internal carotid artery  No stenosis or dissection  NASCET criteria was used to determine the degree of internal carotid artery diameter stenosis  INTRACRANIAL VASCULATURE INTERNAL CAROTID ARTERIES:  Normal enhancement of the intracranial portions of the internal carotid arteries  Normal ophthalmic artery origins  Normal ICA terminus  ANTERIOR CIRCULATION:  Symmetric A1 segments and anterior cerebral arteries with normal enhancement  Normal anterior communicating artery  MIDDLE CEREBRAL ARTERY CIRCULATION:  M1 segment and middle cerebral artery branches demonstrate normal enhancement bilaterally   DISTAL VERTEBRAL ARTERIES:  Normal distal vertebral arteries  Posterior inferior cerebellar artery origins are normal  Normal vertebral basilar junction  BASILAR ARTERY:  Basilar artery is normal in caliber  Normal superior cerebellar arteries  POSTERIOR CEREBRAL ARTERIES: Both posterior cerebral arteries arise from the internal carotid arteries consistent with a fetal origin  No focal stenosis is identified  Normal posterior communicating arteries  DURAL VENOUS SINUSES:  Normal  NON VASCULAR ANATOMY BONY STRUCTURES:  No acute osseous abnormality  SOFT TISSUES OF THE NECK:  Unremarkable  THORACIC INLET:  7 mm pulmonary nodule medially in the left upper lobe adjacent to the aortic arch image 261, series 4  Pleural thickening left upper lung posteriorly        Impression: 1  No hemodynamically significant stenosis in the major arteries of the neck  2   No intracranial aneurysm or major intracranial arterial stenosis  3   No acute intracranial hemorrhage  4   7 mm left upper lobe pulmonary nodule  Based on current Fleischner Society 2017 Guidelines on incidental pulmonary nodule, followup non-contrast CT is recommended at 6-12 months from the initial examination and, if stable at that time, an additional  followup is recommended for 18-24 months from the initial examination  I personally discussed this study with Ina Maddox on 3/9/2018 at 4:28 PM   Specific recommendations regarding the 7 mm left upper lobe pulmonary nodule were communicated to the emergency room physician on duty at this time as Dr Snow walter at the end of his shift  Dr Rahul Rey understands the incidental finding of a  7 mm left upper lobe pulmonary nodule and the need for follow-up imaging  I personally discussed this study with Rahul Rey on 3/9/2018 at 4:44 PM  Workstation performed: [id number]     X-ray Chest 2 Views    Result Date: 3/9/2018  Narrative: CHEST INDICATION:  Mid chest pressure and discomfort x2 days    Shortness of breath   stroke COMPARISON:  5/8/2014 EXAM PERFORMED/VIEWS:  XR CHEST PA & LATERAL  The frontal view was performed utilizing dual energy radiographic technique  Images: 4 FINDINGS: Cardiomediastinal silhouette appears unremarkable  The lungs are clear  No pneumothorax or pleural effusion  Osseous structures appear within normal limits for patient age  Impression: No acute cardiopulmonary disease  Workstation performed: [id number]     Xr Knee 1 Or 2 Vw Left    Result Date: 3/7/2018  Narrative: LEFT KNEE INDICATION:  Encounter for other specified special examinations  COMPARISON: None VIEWS:  AP bilateral projection IMAGES:  1 FINDINGS: There is no acute fracture or dislocation  Joint effusion cannot be reliably evaluated on this single projection  No significant degenerative changes  No lytic or blastic lesions are seen  Soft tissues are unremarkable  The right knee will be reported separately  Impression: Unremarkable single view left knee  Workstation performed: [id number]     Xr Knee 4+ Vw Right Injury    Result Date: 3/7/2018  Narrative: RIGHT KNEE INDICATION:  Posterior right knee pain, unable to bear weight  Patient fell 3 days ago  COMPARISON: 3/4/2018 VIEWS:  4 IMAGES:  4 FINDINGS: There is no acute fracture or dislocation  There is no joint effusion  No significant degenerative changes  No lytic or blastic lesions are seen  Soft tissues are unremarkable  The left knee will be reported separately  Impression: No acute osseous abnormality  Workstation performed: [id number]     Xr Knee 4+ Views Right Injury    Result Date: 3/5/2018  Narrative: RIGHT KNEE INDICATION: pain post jumping mehran  History taken directly from the electronic ordering system  COMPARISON: None  VIEWS:  4 IMAGES:  4 FINDINGS: There is no acute fracture or dislocation  There is no joint effusion  No significant degenerative changes  No lytic or blastic lesions are seen  Soft tissues are unremarkable       Impression: No acute osseous abnormality  Workstation performed: [id number]     Mri Brain W Wo Contrast    Result Date: 3/10/2018  Narrative: MRI BRAIN WITH AND WITHOUT CONTRAST INDICATION:  Slurred speech  Right facial droop, 24 hours  CVA/TIA  COMPARISON:  MRI dated 7/14/2014  CT and CT angiography dated 3/9/2018  TECHNIQUE: Sagittal T1, axial T2, axial FLAIR, axial T1, axial [place], axial diffusion  Sagittal, axial T1 postcontrast   Axial bravo postcontrast with coronal reconstructions  IV Contrast:  8 mL of gadobutrol injection (MULTI-DOSE)  IMAGE QUALITY:   Diagnostic  FINDINGS: BRAIN PARENCHYMA:  No mass, mass effect or midline shift  No extra-axial fluid collections  A few tiny white matter hyperintensities are seen on FLAIR imaging within the cerebral hemispheres possibly representing precocious chronic microangiopathy  Diffusion imaging is unremarkable with no evidence of acute ischemia  Normal corpus callosum and hypothalamus  Postcontrast imaging of the brain demonstrates no abnormal enhancement  VENTRICLES:  Normal  SELLA AND PITUITARY GLAND:  Normal  ORBITS:  Normal  PARANASAL SINUSES:  The left maxillary sinus is mildly hypoplastic and demonstrates mild mucosal thickening  VASCULATURE:  Evaluation of the major intracranial vasculature demonstrates appropriate flow voids  CALVARIUM AND SKULL BASE:  Normal  EXTRACRANIAL SOFT TISSUES:  Normal      Impression: No acute intracranial pathology  No acute ischemia, mass or hemorrhage  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   Workstation performed: [id number]       Other Pertinent Test Results    Results from last 7 days  Lab Units 03/10/18  0507 03/09/18  1522   WBC Thousand/uL 5 24 5 34   HEMOGLOBIN g/dL 12 7 13 3   HEMATOCRIT % 37 9 39 3   MCV fL 85 85   PLATELETS Thousands/uL 234 261   INR   --  0 94       Results from last 7 days  Lab Units 03/10/18  0507 03/09/18  1522   SODIUM mmol/L 140 140   POTASSIUM mmol/L 4 2 4 2   CHLORIDE mmol/L 102 102   CO2 mmol/L 32 28   ANION GAP mmol/L 6 10   BUN mg/dL 18 20   CREATININE mg/dL 0 87 0 69   CALCIUM mg/dL 9 4 9 3   EGFR ml/min/1 73sq m 77 100   GLUCOSE RANDOM mg/dL 96 104                        Results from last 7 days  Lab Units 03/10/18  0507   HEMOGLOBIN A1C % 5 3                   Cultures:         Invalid input(s): URIBILINOGEN              PHYSICAL EXAM:  Vitals:   Blood Pressure: 121/85 (03/10/18 1200)  Pulse: 102 (03/10/18 1200)  Temperature: 98 1 °F (36 7 °C) (03/10/18 0800)  Temp Source: Tympanic (03/10/18 0800)  Respirations: 18 (03/10/18 0800)  Height: 5' 5" (165 1 cm) (03/09/18 1521)  Weight - Scale: 81 2 kg (179 lb 0 2 oz) (03/09/18 1521)  SpO2: 94 % (2 liters) (03/10/18 0800)    GENERAL: AAO x 3  HEENT: atraumatic, normocephalic  Oral mucosa moist, no icterus, pallor  PERRLA +  Neck supple, no JVD, no lymphadenopathy, no thryomegaly  CHEST: B/L breath sounds heard, occasional wheezing  CVS: S1, S2  No cyanosis/clubbing or edema  ABDOMEN: Soft/obese/NT/BS heard  NEUROLOGICAL: Right lower motor neuron facial palsy, rest of the cranial nerves intact  No focal motor or sensory deficits  No signs of meningeal irritation or cerebellar dysfunction  EXTREMITIES: No cyanosis/clubbing or edema  Discharge Disposition: Home/Self Care      Test Results Pending at Discharge:  Order Current Status    Lyme Antibody Profile with reflex to WB Collected (03/10/18 6269)            Medications   Please see After Visit Summary for reconciled discharge medications provided to patient and family  Diet restrictions: Dysphagia diet       Diet Orders            Start     Ordered    03/10/18 1112  Diet Dysphagia/Modified Consistency; Dysphagia 3-Dental Soft; Thin Liquid  Diet effective now     Question Answer Comment   Diet Type Dysphagia/Modified Consistency    Dysphagia/Modified Consistency Dysphagia 3-Dental Soft    Liquid Modifier Thin Liquid    RD to adjust diet per protocol?  Yes 03/10/18 1112    03/10/18 1021  Room Service  Once     Question:  Type of Service  Answer:  Room Service-Appropriate    03/10/18 1021        Activity restrictions: No strenuous activity  Discharge Condition: good    Outpatient Follow-Up  1  [place] Melissa DO [facility] [address] [facility] [place] [phone] - follow-up within one week  2  SL neurology in 4 weeks  3  PCP in 1 week  Code Status: Level 1 - Full Code  Discharge Statement   I spent 33 minutes discharging the patient  This time was spent on the day of discharge  Greater than 50% of total time was spent with the patient and / or family counseling and / or coordination of care        Jeri Dooley MD  HOSPITALIST SERVICES  3/10/2018

## 2018-03-10 NOTE — CASE MANAGEMENT
Initial Clinical Review    Admission: Date/Time/Statement: 3/9/18 @ 1635     Orders Placed This Encounter   Procedures    Inpatient Admission (expected length of stay for this patient is greater than two midnights)     Standing Status:   Standing     Number of Occurrences:   1     Order Specific Question:   Admitting Physician     Answer:   OTILIO VALLECILLO [[id number]]     Order Specific Question:   Level of Care     Answer:   Med Surg [[id number]]     Order Specific Question:   Estimated length of stay     Answer:   More than 2 Midnights     Order Specific Question:   Certification     Answer:   I certify that inpatient services are medically necessary for this patient for a duration of greater than two midnights  See H&P and MD Progress Notes for additional information about the patient's course of treatment  ED: Date/Time/Mode of Arrival:   ED Arrival Information     Expected Arrival Acuity Means of Arrival Escorted By Service Admission Type    - 3/9/2018 14:50 Emergent [address] Emergency    Arrival Complaint    Facial droop          Chief Complaint:   Chief Complaint   Patient presents with    CVA/TIA-like Symptoms     pt states she woke this am around 1030am and noted her right side of her face was numb and  with a droop   pt states she was having a hard time eating last night  went to sleep with no complaints  History of Illness: 55-year-old female presents for evaluation of moderate right-sided facial droop with right-sided altered sensation  The patient believes that her symptoms may have started last evening and she was having changes while she was eating a taco   The patient then noticed that she had a facial droop around 630 this morning and was having difficulty lifting a cup of coffee with her right hand this morning  The patient then noticed weakness in the right arm when she attempted to adjust the shower head around 11 30    The patient has also noticed that she is having decreased blinking in her right eye  The patient has had a mild associated headache on the right side   Neurological: She is alert and oriented to person, place, and time  A sensory deficit (right face and arm) is present  Cranial nerve deficit:  right facial droop, decreased blink on right  She exhibits abnormal muscle tone (minor decreased  on right)  Coordination and gait normal  GCS eye subscore is 4  GCS verbal subscore is 5  GCS motor subscore is 6  ED Vital Signs:   ED Triage Vitals   Temperature Pulse Respirations Blood Pressure SpO2   03/09/18 1730 03/09/18 1500 03/09/18 1500 03/09/18 1500 03/09/18 1500   97 6 °F (36 4 °C) (!) 108 16 141/86 98 %      Temp Source Heart Rate Source Patient Position - Orthostatic VS BP Location FiO2 (%)   03/09/18 1730 03/09/18 1500 03/09/18 1500 03/09/18 1500 --   Tympanic Monitor Lying Right arm       Pain Score       03/09/18 1500       No Pain        Wt Readings from Last 1 Encounters:   03/09/18 81 2 kg (179 lb 0 2 oz)       Abnormal Labs/Diagnostic Test Results:   CTA Head & Neck:     No hemodynamically significant stenosis in the major arteries of the neck  2   No intracranial aneurysm or major intracranial arterial stenosis  3   No acute intracranial hemorrhage  4   7 mm left upper lobe pulmonary nodule  CXR: No acute cardiopulmonary disease  EKG: Normal sinus rhythm  CBC  And BMP  wnl's    ED Treatment:   Medication Administration from 03/09/2018 1450 to 03/09/2018 1745       Date/Time Order Dose Route Action Action by Comments     03/09/2018 1608 iohexol (OMNIPAQUE) 350 MG/ML injection (MULTI-DOSE) 85 mL 85 mL Intravenous Given Michelle Germain      03/09/2018 1642 aspirin tablet 325 mg 325 mg Oral Given Milinda Dancer, RN      03/09/2018 1643 clopidogrel (PLAVIX) tablet 300 mg 300 mg Oral Given Milinda Dancer, RN           Past Medical/Surgical History:    Active Ambulatory Problems     Diagnosis Date Noted    No Active Ambulatory Problems Resolved Ambulatory Problems     Diagnosis Date Noted    No Resolved Ambulatory Problems     Past Medical History:   Diagnosis Date    Knee injury        Admitting Diagnosis: Facial droop [R29 810]  Stroke (cerebrum) ([facility] [address] ) [I63 9]    Age/Sex: 46 y o  female    Assessment/Plan:   Principal Problem:    Facial droop  Active Problems:    Slurred speech    Recent URI    Arthritis right knee    Pulmonary nodule    Right facial numbness     Primary Problem(s):  · Right-sided facial droop / right-sided facial numbness / slurred speech  ? Also with right eye tearing, decreased blinking, and difficulty closing right eye   ? Onset of symptoms occurred last night around 5:00 p m  while eating dinner  Pt went to bed and awoke with persistence of symptoms which caused her to present to the ER  ? Will rule out acute ischemia event but there is a suspicion for Bell's palsy given recent viral illness approximately 1 month ago along with exam finding of pt not being able to raise her right eyebrow  ? CTA of head and neck with and without contrast revealed no hemodynamically significant stenosis in the major arteries of the neck  No intracranial hemorrhage or major intracranial arterial stenosis  No acute intracranial hemorrhage  ? ED discussed the case with Dr Yvan Ledesma who advised if there is no bleed to load with Plavix 300 mg and then 75 mg daily  ? Received aspirin bolus 325 mg in ED  Place on 81 mg aspirin daily  ? Lipid panel from [facility] done on 2/19/18 with cholesterol 174, triglycerides 149, HDL 53, LDL 91   ? Hold off on starting statin at this time in the setting of lipid panel WNL and suspicion for bells palsy  Will defer to neurology if statin is further indicated  ? Will check hemoglobin A1c given none recent  ? Obtain MRI brain with and without contrast, echocardiogram   ? Monitor on telemetry and neuro checks  ? Consult Neurology     Additional Problems:   · Recent URI:  Proximally want a month ago    Patient saw PCP at that time and was told she had a viral infection  Symptoms subsided and she gradually improved      · Incidental finding #1:  Pulmonary nodule: 7 mm left upper lobe pulmonary nodule  Will need outpatient follow-up and surveillance  Patient is not a smoker      · Right shin numbness and tingling:  Patient with history of this and previously presented to [facility] on 2/20/18 it was determined that patient had frostbite at that time  Patient notes she had an episode of numbness and tingling today but this has resolved  Patient is on a stroke pathway      · Right knee arthritis: pt known to ortho as an outpt  She is currently wearing a knee brace for recent fall/injury to her right knee  Imaging was obtained as an outpatient with no acute fracture         VTE Prophylaxis: Enoxaparin (Lovenox)  / sequential compression device   Code Status:  Full code  Anticipated Length of Stay:  Patient will be admitted on an Inpatient basis with an anticipated length of stay of  greater than 2 midnights  Justification for Hospital Stay:  Stroke pathway       Admission Orders:  IP Tele  TELE  Neuro Checks q1h x 4, q2h x 4, q4h  Dysphagia assessment  Consult Neuro  NPO  Speech Eval  PT / OT Eval  MRI Head  ECHO  SCD's    Scheduled Meds:   Plavix 300 po x 1  Current Facility-Administered Medications:  acetaminophen 650 mg Oral Q4H PRN RALPH Cloud-ALDO   artificial tear  Right Eye BID RALPH Cruz-ALDO   aspirin 81 mg Oral Daily RALPH Cruz-ALDO   enoxaparin 40 mg Subcutaneous Daily Fabiana Alatorre PA-ALDO   ondansetron 4 mg Intravenous Q6H PRN Fabiana Alatorre PA-C     Continuous Infusions:    PRN Meds:   Acetaminophen x 1    ondansetron  Failed Dysphagia Eval - made NPO  MRI 3/10: No acute intracranial pathology  No acute ischemia, mass or hemorrhage    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        Thank you,  [facility] the [facility] by Dane Vela for 2017  Network Utilization Review Department  Phone: [phone]; Fax [phone]  ATTENTION: The Network Utilization Review Department is now centralized for our 7 Facilities  Make a note that we have a new phone and fax numbers for our Department  Please call with any questions or concerns to [phone] and carefully follow the prompts so that you are directed to the right person  All voicemails are confidential  Fax any determinations, approvals, denials, and requests for initial or continue stay review clinical to [phone]  Due to HIGH CALL volume, it would be easier if you could please send faxed requests to expedite your requests and in part, help us provide discharge notifications faster

## 2018-03-12 ENCOUNTER — TRANSITIONAL CARE MANAGEMENT (OUTPATIENT)
Dept: FAMILY MEDICINE CLINIC | [place] CLINIC | Age: 53
End: 2018-03-12

## 2018-03-12 ENCOUNTER — TELEPHONE (OUTPATIENT)
Dept: NEUROLOGY | [place] CLINIC | Age: 53
End: 2018-03-12

## 2018-03-12 LAB
B BURGDOR IGG SER IA-ACNC: 0.09
B BURGDOR IGM SER IA-ACNC: 0.23

## 2018-03-13 NOTE — CASE MANAGEMENT
Notification of Discharge  This is a Notification of Discharge from our facility [address]  Please be advised that this patient has been discharge from our facility  Below you will find the admission and discharge date and time including the patients disposition  PRESENTATION DATE: 3/9/2018  2:56 PM  IP ADMISSION DATE: 3/9/18 1635  DISCHARGE DATE: 3/10/2018  6:30 PM  DISPOSITION: Home/Self Care    [address] [place] [address] in the [facility] by Morgan Valenzuela for 2017  Network Utilization Review Department  Phone: [phone]; Fax [phone]  ATTENTION: The Network Utilization Review Department is now centralized for our 7 Facilities  Make a note that we have a new phone and fax numbers for our Department  Please call with any questions or concerns to [phone] and carefully follow the prompts so that you are directed to the right person  All voicemails are confidential  Fax any determinations, approvals, denials, and requests for initial or continue stay review clinical to [phone]  Due to HIGH CALL volume, it would be easier if you could please send faxed requests to expedite your requests and in part, help us provide discharge notifications faster

## 2018-03-15 ENCOUNTER — OFFICE VISIT (OUTPATIENT)
Dept: OBGYN CLINIC | [place] MEDICAL CENTER | Age: 53
End: 2018-03-15
Payer: COMMERCIAL

## 2018-03-15 VITALS
HEART RATE: 91 BPM | DIASTOLIC BLOOD PRESSURE: 84 MMHG | BODY MASS INDEX: 24.99 KG/M2 | WEIGHT: 150 LBS | HEIGHT: 65 IN | SYSTOLIC BLOOD PRESSURE: 135 MMHG

## 2018-03-15 DIAGNOSIS — M19.90 ARTHRITIS: Primary | ICD-10-CM

## 2018-03-15 DIAGNOSIS — M25.561 ACUTE PAIN OF RIGHT KNEE: ICD-10-CM

## 2018-03-15 PROCEDURE — 99213 OFFICE O/P EST LOW 20 MIN: CPT | Performed by: ORTHOPAEDIC SURGERY

## 2018-03-15 NOTE — PROGRESS NOTES
Assessment/Plan:    1  Arthritis right knee  MRI knee right  wo contrast   2  Acute pain of right knee  MRI knee right  wo contrast     Discussion:  -patient received a cane today   -she will hold off on her naproxen till she is done with oral steroids  She may resume naproxen after she finishes oral steroids  During the time of oral steroids she may take Tylenol for pain  -she will go for an MRI of her right knee  -she can continue to wear the knee brace as needed for comfort   -she may weightbear as tolerated  -we will continue with physical therapy   -we will hold off on a steroid injection due to her being on oral steroids at this time  We may consider steroid injection at her next visit  Return for after MRI  Subjective    History of Present Illness   Chief Complaint   Patient presents with    Right Knee - Follow-up       Lynsey Robb is a 46 y o  female who presents for follow-up of right knee pain  Since her last visit she has gone to 2 sessions of physical therapy  She has been walking with crutches  She was taking naproxen but stopped it due to being on oral steroids  She has been wearing a knee brace which does seem to help  She does not feel her knee is much better  She still has a lot of tightness and mild pain posterior and medial   The pain does not radiate  Since her last visit she was also hospitalized and worked up for stroke  She was also found to  palsy of her right side of her face  She is on oral steroids for this in stops them on Sunday  [employer] software was used to dictate this note  It may contain errors with dictating incorrect words/spelling  Please contact physician directly for any questions       Review of Systems  Constitutional: negative  Eyes: negative  Respiratory: negative- no audible wheezing   Musculoskeletal: as noted in HPI  Neurological: negative for numbness, tingling, strength intact   Behavioral/Psych: negative    History:    Past Medical History: Diagnosis Date    Knee injury     right knee     Past Surgical History:   Procedure Laterality Date    LAPAROSCOPY      Exploratory     Social History   History   Alcohol Use No     History   Drug Use No     History   Smoking Status    Former Smoker   Smokeless Tobacco    Never Used     Family History:   Family History   Problem Relation Age of Onset    Diabetes Mother     Hypertension Mother     Heart disease Mother        Meds/Allergies   Allergies   Allergen Reactions    Penicillin V Anaphylaxis    Penicillins           Objective     /84   Pulse 91   Ht 5' 5" (1 651 m)   Wt 68 kg (150 lb)   BMI 24 96 kg/m²     Exam   Ortho Exam  Procedures  Knee Exam    Appearance:  Normal with no swelling or bruising and no obvious joint deformity  Palpation/Tenderness:  medial joint line tenderness  Active Range of Motion:  Flexion: No limitation and Extension: No limitation  Special Tests:   Hiro's Test:  positive  Lachman's Test:  negative  Anterior Drawer Test:  negative  Posterior Drawer Test:  negative  Valgus Stress Test:  negative  Varus Stress Test:  negative

## 2018-03-19 ENCOUNTER — OFFICE VISIT (OUTPATIENT)
Dept: FAMILY MEDICINE CLINIC | [place] CLINIC | Age: 53
End: 2018-03-19
Payer: COMMERCIAL

## 2018-03-19 ENCOUNTER — TRANSITIONAL CARE MANAGEMENT (OUTPATIENT)
Dept: FAMILY MEDICINE CLINIC | [place] CLINIC | Age: 53
End: 2018-03-19

## 2018-03-19 VITALS
HEIGHT: 65 IN | TEMPERATURE: 98.5 F | SYSTOLIC BLOOD PRESSURE: 142 MMHG | DIASTOLIC BLOOD PRESSURE: 80 MMHG | WEIGHT: 169.44 LBS | HEART RATE: 88 BPM | OXYGEN SATURATION: 98 % | BODY MASS INDEX: 28.23 KG/M2 | RESPIRATION RATE: 18 BRPM

## 2018-03-19 DIAGNOSIS — Z13.220 SCREENING FOR CHOLESTEROL LEVEL: ICD-10-CM

## 2018-03-19 DIAGNOSIS — R07.9 CHEST PAIN, UNSPECIFIED TYPE: ICD-10-CM

## 2018-03-19 DIAGNOSIS — G51.0 BELL'S PALSY: Primary | ICD-10-CM

## 2018-03-19 DIAGNOSIS — R91.1 PULMONARY NODULE: ICD-10-CM

## 2018-03-19 DIAGNOSIS — Z12.11 SCREENING FOR COLON CANCER: ICD-10-CM

## 2018-03-19 DIAGNOSIS — Z13.29 SCREENING FOR THYROID DISORDER: ICD-10-CM

## 2018-03-19 PROCEDURE — 99495 TRANSJ CARE MGMT MOD F2F 14D: CPT | Performed by: FAMILY MEDICINE

## 2018-03-19 NOTE — PROGRESS NOTES
Assessment/Plan: zg    1  Bell's palsy  Reviewed patient's symptoms with her  She states that her Cheikh Brown appears to be slowly improving  Reviewed her previous testing, she was advised today that her symptoms are unlikely secondary to CVA  She has completed her steroids  Reviewed her Lyme testing today  This testing was all negative  She will have a follow-up appointment next month with Neurology  - Lipid Panel with Direct LDL reflex; Future    2  Chest pain, unspecified type  Unclear as to the exact etiology of this problem patient states that she has been having periodic chest pain for the past few weeks  Reviewed her testing in the ER  At this time, check nuclear stress test that she cannot exercise   - NM myocardial perfusion spect (rx stress and/or rest); Future    3  Pulmonary nodule  Incidentally found on her previous CT  Unclear as to the exact cause of this problem  Patient states that she does have exposure to secondhand smoke however does not herself smoke cigarettes  She was advised that per Radiology recommendations, she will need to have a repeat CT in 6 months to further evaluate any change in this area  4  Screening for colon cancer    - Ambulatory referral to Gastroenterology; Future    5  Screening for thyroid disorder    - TSH, 3rd generation with T4 reflex; Future    6  Screening for cholesterol level    - Lipid Panel with Direct LDL reflex; Future  - Comprehensive metabolic panel; Future     Diagnoses and all orders for this visit:    Bell's palsy  -     Lipid Panel with Direct LDL reflex; Future    Chest pain, unspecified type  -     NM myocardial perfusion spect (rx stress and/or rest); Future    Pulmonary nodule    Screening for colon cancer  -     Ambulatory referral to Gastroenterology; Future    Screening for thyroid disorder  -     TSH, 3rd generation with T4 reflex; Future    Screening for cholesterol level  -     Lipid Panel with Direct LDL reflex;  Future  - Comprehensive metabolic panel; Future          Subjective:    Chief Complaint   Patient presents with    Transition of Care Management     03/09/2018/ out of breath/ not remember things         Patient ID: Sha Albert is a 46 y o  female  Patient is a 55-year-old female presents today for VLADISLAV visit  She was admitted to [facility] 3/9 and subsequently discharged on 3/10 due to Bell's palsy  Her symptoms developed quickly  She states that she did have extensive testing in the ER which she presented  She had blood work as well as a CTA of her head and neck there was no sign of CVA or other intracranial abnormalities  She did have a consultation with Neurology  She was seen and evaluated started on treatment oral steroids  She did have a incidental finding a right upper lobe pulmonary nodule  Patient states that since her discharge, she has been feeling persistent fatigue  She does occasionally chest pain over the central portion of her chest   Denies radiation  Review of Systems   Constitutional: Positive for fatigue  Negative for activity change, chills and fever  HENT: Negative for congestion, ear pain, sinus pressure and sore throat  Eyes: Negative for redness, itching and visual disturbance  Respiratory: Negative for cough and shortness of breath  Cardiovascular: Positive for chest pain  Negative for palpitations  Gastrointestinal: Negative for abdominal pain, diarrhea and nausea  Endocrine: Negative for cold intolerance and heat intolerance  Genitourinary: Negative for dysuria, flank pain and frequency  Musculoskeletal: Negative for arthralgias, back pain, gait problem and myalgias  Skin: Negative for color change  Allergic/Immunologic: Negative for environmental allergies  Neurological: Positive for facial asymmetry and numbness  Negative for dizziness and headaches  Psychiatric/Behavioral: Negative for behavioral problems and sleep disturbance  The following portions of the patient's history were reviewed and updated as appropriate : past family history, past medical history, past social history and past surgical history  Objective:    Vitals:    03/19/18 1216   BP: 142/80   BP Location: Left arm   Patient Position: Sitting   Cuff Size: Standard   Pulse: 88   Resp: 18   Temp: 98 5 °F (36 9 °C)   TempSrc: Tympanic   SpO2: 98%   Weight: 76 9 kg (169 lb 7 oz)   Height: 5' 5" (1 651 m)        Physical Exam   Constitutional: She is oriented to person, place, and time  She appears well-developed and well-nourished  HENT:   Head: Normocephalic and atraumatic  Nose: Nose normal    Mouth/Throat: No oropharyngeal exudate  Eyes: Pupils are equal, round, and reactive to light  Right eye exhibits no discharge  Left eye exhibits no discharge  Neck: Normal range of motion  Neck supple  No tracheal deviation present  Cardiovascular: Normal rate, regular rhythm and intact distal pulses  Exam reveals no gallop and no friction rub  No murmur heard  Pulses:       Dorsalis pedis pulses are 2+ on the right side, and 2+ on the left side  Posterior tibial pulses are 2+ on the right side, and 2+ on the left side  Pulmonary/Chest: Effort normal and breath sounds normal  No respiratory distress  She has no wheezes  She has no rales  Abdominal: Soft  Bowel sounds are normal  She exhibits no distension  There is no tenderness  There is no rebound and no guarding  Musculoskeletal: Normal range of motion  She exhibits no edema  Lymphadenopathy:        Head (right side): No submental and no submandibular adenopathy present  Head (left side): No submental and no submandibular adenopathy present  She has no cervical adenopathy  Right cervical: No superficial cervical, no deep cervical and no posterior cervical adenopathy present         Left cervical: No superficial cervical, no deep cervical and no posterior cervical adenopathy present  Neurological: She is alert and oriented to person, place, and time  A cranial nerve deficit and sensory deficit is present  Mild focal deficit located over right 7th CN   Skin: Skin is warm, dry and intact  Psychiatric: Her speech is normal and behavior is normal  Judgment normal  Her mood appears not anxious  Cognition and memory are normal  She does not exhibit a depressed mood  Vitals reviewed

## 2018-03-19 NOTE — CASE MANAGEMENT
Stephen Wagner RN Registered Nurse Signed Case Management Date of Service: 3/10/2018  5:29 PM      Initial Clinical Review     Admission: Date/Time/Statement: 3/9/18 @ 1635            Orders Placed This Encounter   Procedures    Inpatient Admission (expected length of stay for this patient is greater than two midnights)       Standing Status:   Standing       Number of Occurrences:   1       Order Specific Question:   Admitting Physician       Answer:   OTILIO VALLECILLO [[id number]]       Order Specific Question:   Level of Care       Answer:   Med Surg [16]       Order Specific Question:   Estimated length of stay       Answer:   More than 2 Midnights       Order Specific Question:   Certification       Answer:   I certify that inpatient services are medically necessary for this patient for a duration of greater than two midnights  See H&P and MD Progress Notes for additional information about the patient's course of treatment          ED: Date/Time/Mode of Arrival:             ED Arrival Information      Expected Arrival Acuity Means of Arrival Escorted By Service Admission Type     - 3/9/2018 14:50 [facility]     Arrival Complaint     Facial droop             Chief Complaint:        Chief Complaint   Patient presents with    CVA/TIA-like Symptoms       pt states she woke this am around 1030am and noted her right side of her face was numb and  with a droop   pt states she was having a hard time eating last night   went to sleep with no complaints           History of Illness: 59-year-old female presents for evaluation of moderate right-sided facial droop with right-sided altered sensation   The patient believes that her symptoms may have started last evening and she was having changes while she was eating a taco   The patient then noticed that she had a facial droop around 630 this morning and was having difficulty lifting a cup of coffee with her right hand this morning   The patient then noticed weakness in the right arm when she attempted to adjust the shower head around 11 30   The patient has also noticed that she is having decreased blinking in her right eye   The patient has had a mild associated headache on the right side   Neurological: She is alert and oriented to person, place, and time  A sensory deficit (right face and arm) is present  Cranial nerve deficit:  right facial droop, decreased blink on right  She exhibits abnormal muscle tone (minor decreased  on right)  Coordination and gait normal  GCS eye subscore is 4  GCS verbal subscore is 5  GCS motor subscore is 6       ED Vital Signs:            ED Triage Vitals   Temperature Pulse Respirations Blood Pressure SpO2   03/09/18 1730 03/09/18 1500 03/09/18 1500 03/09/18 1500 03/09/18 1500   97 6 °F (36 4 °C) (!) 108 16 141/86 98 %       Temp Source Heart Rate Source Patient Position - Orthostatic VS BP Location FiO2 (%)   03/09/18 1730 03/09/18 1500 03/09/18 1500 03/09/18 1500 --   Tympanic Monitor Lying Right arm         Pain Score           03/09/18 1500           No Pain            Wt Readings from Last 1 Encounters:   03/09/18 81 2 kg (179 lb 0 2 oz)         Abnormal Labs/Diagnostic Test Results:   CTA Head & Neck:     No hemodynamically significant stenosis in the major arteries of the neck  2   No intracranial aneurysm or major intracranial arterial stenosis  3   No acute intracranial hemorrhage  4   7 mm left upper lobe pulmonary nodule  CXR: No acute cardiopulmonary disease    EKG: Normal sinus rhythm  CBC  And BMP  wnl's     ED Treatment:              Medication Administration from 03/09/2018 1450 to 03/09/2018 1745        Date/Time Order Dose Route Action Action by Comments       03/09/2018 1608 iohexol (OMNIPAQUE) 350 MG/ML injection (MULTI-DOSE) 85 mL 85 mL Intravenous Given Gopal Coreas         03/09/2018 1642 aspirin tablet 325 mg 325 mg Oral Given Luz Elena Garcia RN         03/09/2018 1643 clopidogrel (PLAVIX) tablet 300 mg 300 mg Oral Given Fab Peterson RN               Past Medical/Surgical History: Active Ambulatory Problems     Diagnosis Date Noted    No Active Ambulatory Problems           Resolved Ambulatory Problems     Diagnosis Date Noted    No Resolved Ambulatory Problems           Past Medical History:   Diagnosis Date    Knee injury           Admitting Diagnosis: Facial droop [R29 810]  Stroke (cerebrum) ([facility] [address] ) [I63 9]     Age/Sex: 46 y o  female     Assessment/Plan:   Principal Problem:    Facial droop  Active Problems:    Slurred speech    Recent URI    Arthritis right knee    Pulmonary nodule    Right facial numbness     Primary Problem(s):  · Right-sided facial droop / right-sided facial numbness / slurred speech  ? Also with right eye tearing, decreased blinking, and difficulty closing right eye   ? Onset of symptoms occurred last night around 5:00 p m  while eating dinner  Pt went to bed and awoke with persistence of symptoms which caused her to present to the ER  ? Will rule out acute ischemia event but there is a suspicion for Bell's palsy given recent viral illness approximately 1 month ago along with exam finding of pt not being able to raise her right eyebrow  ? CTA of head and neck with and without contrast revealed no hemodynamically significant stenosis in the major arteries of the neck   No intracranial hemorrhage or major intracranial arterial stenosis   No acute intracranial hemorrhage  ? ED discussed the case with Dr Velvet Osgood who advised if there is no bleed to load with Plavix 300 mg and then 75 mg daily  ? Received aspirin bolus 325 mg in ED   Place on 81 mg aspirin daily  ? Lipid panel from [facility] done on 2/19/18 with cholesterol 174, triglycerides 149, HDL 53, LDL 91   ? Hold off on starting statin at this time in the setting of lipid panel WNL and suspicion for bells palsy  Will defer to neurology if statin is further indicated    ? Will check hemoglobin A1c given none recent  ? Obtain MRI brain with and without contrast, echocardiogram   ? Monitor on telemetry and neuro checks  ? Consult Neurology     Additional Problems:   · Recent URI:  Proximally want a month ago  Alju Ruiz saw PCP at that time and was told she had a viral infection   Symptoms subsided and she gradually improved      · Incidental finding #1:  Pulmonary nodule: 7 mm left upper lobe pulmonary nodule   Will need outpatient follow-up and surveillance  Jason Ruiz is not a smoker      · Right shin numbness and tingling:  Patient with history of this and previously presented to [facility] on 2/20/18 it was determined that patient had frostbite at that time  Alju Ruiz notes she had an episode of numbness and tingling today but this has resolved  Jason Ruiz is on a stroke pathway      · Right knee arthritis: pt known to ortho as an outpt  She is currently wearing a knee brace for recent fall/injury to her right knee   Imaging was obtained as an outpatient with no acute fracture         VTE Prophylaxis: Enoxaparin (Lovenox)  / sequential compression device   Code Status:  Full code  Anticipated Length of Stay:  Patient will be admitted on an Inpatient basis with an anticipated length of stay of  greater than 2 midnights   Justification for Hospital Stay:  Stroke pathway        Admission Orders:  IP Tele  TELE  Neuro Checks q1h x 4, q2h x 4, q4h  Dysphagia assessment  Consult Neuro  NPO  Speech Eval  PT / OT Eval  MRI Head  ECHO  SCD's     Scheduled Meds:   Plavix 300 po x 1  Current Facility-Administered Medications:  acetaminophen 650 mg Oral Q4H PRN Carl Tariq PA-ALDO   artificial tear   Right Eye BID Fabiana Alatorre PA-C   aspirin 81 mg Oral Daily Fabiana Alatorre PA-C   enoxaparin 40 mg Subcutaneous Daily Fabiana Alatorre PA-C   ondansetron 4 mg Intravenous Q6H PRN RALPH Dominique-ALDO      Continuous Infusions:    PRN Meds:   Acetaminophen x 1    ondansetron  Failed Dysphagia Eval - made NPO  MRI 3/10: No acute intracranial pathology   No acute ischemia, mass or hemorrhage  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        Notification of Discharge  This is a Notification of Discharge from our facility [address]  Please be advised that this patient has been discharge from our facility  Below you will find the admission and discharge date and time including the patients disposition  PRESENTATION DATE: 3/9/2018  2:56 PM  IP ADMISSION DATE: 3/9/18 1635  DISCHARGE DATE: 3/10/2018  6:30 PM  DISPOSITION: Home/Self Care    [address] [place] [address] in the [facility] by [address] for 2017  Network Utilization Review Department  Phone: [phone]; Fax [phone]  ATTENTION: The Network Utilization Review Department is now centralized for our 7 Facilities  Make a note that we have a new phone and fax numbers for our Department  Please call with any questions or concerns to [phone] and carefully follow the prompts so that you are directed to the right person  All voicemails are confidential  Fax any determinations, approvals, denials, and requests for initial or continue stay review clinical to [phone]  Due to HIGH CALL volume, it would be easier if you could please send faxed requests to expedite your requests and in part, help us provide discharge notifications faster

## 2018-03-20 ENCOUNTER — DOCUMENTATION (OUTPATIENT)
Dept: FAMILY MEDICINE CLINIC | [place] CLINIC | Age: 53
End: 2018-03-20

## 2018-03-30 ENCOUNTER — OFFICE VISIT (OUTPATIENT)
Dept: OBGYN CLINIC | [place] MEDICAL CENTER | Age: 53
End: 2018-03-30
Payer: COMMERCIAL

## 2018-03-30 VITALS
DIASTOLIC BLOOD PRESSURE: 90 MMHG | BODY MASS INDEX: 28.32 KG/M2 | HEART RATE: 85 BPM | HEIGHT: 65 IN | WEIGHT: 170 LBS | SYSTOLIC BLOOD PRESSURE: 140 MMHG

## 2018-03-30 DIAGNOSIS — S89.91XA ACUTE INJURY OF RIGHT ANTERIOR CRUCIATE LIGAMENT, INITIAL ENCOUNTER: Primary | ICD-10-CM

## 2018-03-30 PROCEDURE — 99213 OFFICE O/P EST LOW 20 MIN: CPT | Performed by: ORTHOPAEDIC SURGERY

## 2018-03-30 NOTE — LETTER
March 30, 2018     Patient: Graciela Tate   YOB: 1965   Date of Visit: 3/30/2018       To Whom it May Concern:    Graciela Tate is under my professional care  She was seen in my office on 3/30/2018  She may return to work with limitations   Ms Leandro Rankin may return to work on sedentary duty only       If you have any questions or concerns, please don't hesitate to call           Sincerely,          Ramon Tran DO        CC: No Recipients

## 2018-03-30 NOTE — PROGRESS NOTES
Assessment/Plan:  1  Acute injury of right anterior cruciate ligament, initial encounter      No orders of the defined types were placed in this encounter  Stop Physical therapy  Naproxen as needed for pain control  Knee brace for comfort  Return in about 2 weeks (around 4/13/2018)  Will consider restarting physical therapy  Sedentary duty at work      Subjective   Chief Complaint: No chief complaint on file  Wilfredo Oneal is a 46 y o  female who presents for follow up for right knee injury  Since her last visit she has been going to physical therapy  She thinks physical therapy is aggravating her right leg and she is feeling pain going down her right leg  She still feels some instability in her right leg and has difficulty with prolonged standing  She does not take Tylenol as it causes pain in her head  She has been taking naproxen as needed but recently has not been taking as much  She has a knee brace  Review of Systems  ROS:    See HPI for musculoskeletal review     All other systems reviewed are negative     History:  Past Medical History:   Diagnosis Date    Knee injury     right knee     Past Surgical History:   Procedure Laterality Date    LAPAROSCOPY      Exploratory     Social History   History   Alcohol Use No     History   Drug Use No     History   Smoking Status    Former Smoker   Smokeless Tobacco    Never Used     Family History:   Family History   Problem Relation Age of Onset    Diabetes Mother     Hypertension Mother     Heart disease Mother        Meds/Allergies     (Not in a hospital admission)  Allergies   Allergen Reactions    Penicillin V Anaphylaxis    Penicillins           Objective     /90   Pulse 85   Ht 5' 5" (1 651 m)   Wt 77 1 kg (170 lb)   BMI 28 29 kg/m²      PE:  AAOx 3  WDWN  Hearing intact, no drainage from eyes  no audible wheezing  no abdominal distension  LE compartments soft, skin intact    Ortho Exam:  right Knee:   No erythema  no swelling  no effusion  no warmth  AROM: full  Stable to varus/valgus stress  Negative Lachman's, negative anterior posterior drawer, negative medial lateral Hiro's test    Imaging Studies: I have personally reviewed pertinent films in PACS  MRI right knee:  Partial ACL tear

## 2018-04-05 ENCOUNTER — OFFICE VISIT (OUTPATIENT)
Dept: NEUROLOGY | [place] CLINIC | Age: 53
End: 2018-04-05
Payer: COMMERCIAL

## 2018-04-05 VITALS
HEIGHT: 65 IN | DIASTOLIC BLOOD PRESSURE: 68 MMHG | RESPIRATION RATE: 14 BRPM | SYSTOLIC BLOOD PRESSURE: 112 MMHG | BODY MASS INDEX: 29.32 KG/M2 | WEIGHT: 176 LBS | HEART RATE: 86 BPM

## 2018-04-05 DIAGNOSIS — G51.0 BELL'S PALSY: Primary | ICD-10-CM

## 2018-04-05 DIAGNOSIS — R20.2 PARESTHESIA OF BOTH HANDS: ICD-10-CM

## 2018-04-05 PROBLEM — R47.81 SLURRED SPEECH: Status: RESOLVED | Noted: 2018-03-09 | Resolved: 2018-04-05

## 2018-04-05 PROBLEM — R20.0 RIGHT FACIAL NUMBNESS: Status: RESOLVED | Noted: 2018-03-09 | Resolved: 2018-04-05

## 2018-04-05 PROBLEM — R29.810 FACIAL DROOP: Status: RESOLVED | Noted: 2018-03-09 | Resolved: 2018-04-05

## 2018-04-05 PROBLEM — J06.9 RECENT URI: Status: RESOLVED | Noted: 2018-03-09 | Resolved: 2018-04-05

## 2018-04-05 PROCEDURE — 99214 OFFICE O/P EST MOD 30 MIN: CPT | Performed by: PHYSICIAN ASSISTANT

## 2018-04-05 NOTE — ASSESSMENT & PLAN NOTE
Patient mentions some paresthesias in the hands, mainly 3rd and 4th fingers, right greater than left hand  She occasionally drops objects  Would be concerned about CTS  Discussed EMG of the upper extremities for further eval, or could try OTC wrist splints at night first to see if that helps  Patient has decided to try OTC wrist splints first   Discussed either I can order EMG in the future if she would like, or PCP could order as well

## 2018-04-05 NOTE — ASSESSMENT & PLAN NOTE
Patient presented to the hospital on 3/9/18 with right facial numbness and facial droop  CVA workup completed and no evidence of CVA  MRI brain neg, CTA head and neck unremarkable  Patient with no cardiovascular risk factors  Lyme was negative  She was given a week of oral steroids  This may have been related to the viral URI she had a few weeks prior to symptoms starting  Discussed with patient the natural course of recovery with Bell's palsy  She has already made excellent progress with near full recovery at this time    She can follow with our office on an as-needed basis

## 2018-04-05 NOTE — PROGRESS NOTES
Patient ID: Verna Gutierrez is a 46 y o  female  Assessment/Plan:    Bell's palsy  Patient presented to the hospital on 3/9/18 with right facial numbness and facial droop  CVA workup completed and no evidence of CVA  MRI brain neg, CTA head and neck unremarkable  Patient with no cardiovascular risk factors  Lyme was negative  She was given a week of oral steroids  This may have been related to the viral URI she had a few weeks prior to symptoms starting  Discussed with patient the natural course of recovery with Bell's palsy  She has already made excellent progress with near full recovery at this time  She can follow with our office on an as-needed basis    Paresthesia of both hands  Patient mentions some paresthesias in the hands, mainly 3rd and 4th fingers, right greater than left hand  She occasionally drops objects  Would be concerned about CTS  Discussed EMG of the upper extremities for further eval, or could try OTC wrist splints at night first to see if that helps  Patient has decided to try OTC wrist splints first   Discussed either I can order EMG in the future if she would like, or PCP could order as well  Diagnoses and all orders for this visit:    Bell's palsy    Paresthesia of both hands           Subjective:    HPI    Patient is a 46year old female with no significant PMH who presents today for a hospital follow up  Patient initially presented to the ED on 3/9/18 with complaint of right facial numbness and drooping  She noted she had a hard time eating the night before  She had decreased blinking of the right eye  Patient also mentioned possible right arm weakness as well  Initial CT head and CTA head and neck was negative for acute infarction or large vessel occlusive disease  She was found to have an incidental 7 mm left upper lobe pulmonary nodule on CTA  She was admitted for further workup and seen by neurology, who felt symptoms were most consistent with Bells palsy  Patient had a viral illness several weeks prior to presentation  MRI brain was negative for CVA  She had a few scattered WM changes, likely representing precocious microvascular changes  No enhancement of the 7th CN  Labs included negative Lyme, normal A1C 5 3  Lipid panel from [facility] done on 2/19/18 with cholesterol 174, triglycerides 149, HDL 53, LDL 91  She was prescribed oral prednisone 60 mg daily for 7 days       Today, patient reports she is doing well from a neurologic standpoint  She notes her right sided facial droop has improved significantly and she is nearly back to baseline  She is able to open and close her eye without difficulty and smile is almost completely normal  She was seen by her PCP for follow up a few weeks ago  They have addressed the pulmonary nodule  She mentioned to PCP that she is having chest pain and palpitations  Stress test was ordered  She mentions to me today that she has occasional paresthesias in her hands, mainly in the middle fingers, 4th finger, and occasionally has trouble opening things and dropping things from her hands  Denies neck pain  No new neuro symptoms such as double vision, loss of vision, trouble with speech or swallowing  The following portions of the patient's history were reviewed and updated as appropriate: current medications, past family history, past medical history, past social history, past surgical history and problem list          Objective:    Blood pressure 112/68, pulse 86, resp  rate 14, height 5' 5" (1 651 m), weight 79 8 kg (176 lb)    Physical Exam   Constitutional: She appears well-developed and well-nourished  HENT:   Head: Normocephalic and atraumatic  Eyes: EOM are normal  Pupils are equal, round, and reactive to light  Cardiovascular: Intact distal pulses  Neurological: She has normal strength and normal reflexes  Gait and coordination normal    Skin: Skin is warm and dry     Psychiatric: She has a normal mood and affect  Her speech is normal        Neurological Exam    Mental Status  The patient is alert and oriented to person, place, time, and situation  Her recent and remote memory are normal  Her speech is normal  Her language is fluent with no aphasia  She has normal attention span and concentration  She has a normal fund of knowledge  Cranial Nerves    CN II: The patient's visual acuity and visual fields are normal   CN III, IV, VI: The patient's pupils are equally round and reactive to light and ocular movements are normal   CN V: The patient has normal facial sensation  CN VIII:  The patient's hearing is normal   CN IX, X: The patient has symmetric palate movement and normal gag reflex  CN XI: The patient's shoulder shrug strength is normal   CN XII: The patient's tongue is midline without atrophy or fasciculations  CN VII-very slight right facial asymmetry  Full closure of the eye, slightly asymmetric smile  Motor  The patient has normal muscle bulk throughout  Her overall muscle tone is normal throughout  Her strength is 5/5 throughout all four extremities  Slightly decreased  strength on the right     Sensory  The patient's sensation is normal in all four extremities  Reflexes  Deep tendon reflexes are 2+ and symmetric in all four extremities with downgoing toes bilaterally  Gait and Coordination  The patient has normal gait and station  She has normal coordination bilaterally  ROS:    Review of Systems   Constitutional: Negative for appetite change and fever  HENT: Positive for hearing loss, sore throat, tinnitus and trouble swallowing  Negative for voice change  Eyes: Positive for visual disturbance  Negative for photophobia and pain  Respiratory: Positive for shortness of breath  Cardiovascular: Negative  Negative for palpitations  Gastrointestinal: Negative  Negative for nausea and vomiting  Endocrine: Negative  Negative for cold intolerance and heat intolerance  Genitourinary: Negative  Negative for dysuria, frequency and urgency  Musculoskeletal: Positive for back pain  Negative for myalgias and neck pain  Skin: Negative  Negative for rash  Neurological: Positive for dizziness and weakness  Negative for tremors, seizures, syncope, facial asymmetry, speech difficulty, light-headedness, numbness and headaches  Hematological: Negative  Does not bruise/bleed easily  Psychiatric/Behavioral: Negative  Negative for confusion, hallucinations and sleep disturbance

## 2018-04-05 NOTE — PATIENT INSTRUCTIONS
You are making great progress in recovering from Bell's palsy  Your imaging did not indicate a stroke  For your hands, can try over the counter wrist splints at night to see if that helps  If not, we (or your PCP) can order a nerve conduction test of your upper extremities to see if there is any carpal tunnel or other nerve issue  You do not need to follow with our office on a regular basis unless you have any other neurological changes  Continue to follow regularly with your PCP    Call for any new or worsening symptoms

## 2018-04-13 ENCOUNTER — OFFICE VISIT (OUTPATIENT)
Dept: OBGYN CLINIC | [place] MEDICAL CENTER | Age: 53
End: 2018-04-13
Payer: COMMERCIAL

## 2018-04-13 VITALS
BODY MASS INDEX: 30.46 KG/M2 | HEIGHT: 65 IN | SYSTOLIC BLOOD PRESSURE: 133 MMHG | WEIGHT: 182.8 LBS | HEART RATE: 92 BPM | DIASTOLIC BLOOD PRESSURE: 89 MMHG

## 2018-04-13 DIAGNOSIS — S89.91XA ACUTE INJURY OF RIGHT ANTERIOR CRUCIATE LIGAMENT, INITIAL ENCOUNTER: Primary | ICD-10-CM

## 2018-04-13 DIAGNOSIS — M25.561 POSTERIOR RIGHT KNEE PAIN: ICD-10-CM

## 2018-04-13 PROCEDURE — 99213 OFFICE O/P EST LOW 20 MIN: CPT | Performed by: ORTHOPAEDIC SURGERY

## 2018-04-13 RX ORDER — NAPROXEN 500 MG/1
500 TABLET ORAL 2 TIMES DAILY WITH MEALS
Qty: 30 TABLET | Refills: 0 | Status: SHIPPED | OUTPATIENT
Start: 2018-04-13 | End: 2018-04-17

## 2018-04-13 RX ORDER — NAPROXEN 500 MG/1
500 TABLET ORAL 2 TIMES DAILY WITH MEALS
Qty: 10 TABLET | Refills: 0 | Status: SHIPPED | OUTPATIENT
Start: 2018-04-13 | End: 2018-08-30 | Stop reason: ALTCHOICE

## 2018-04-13 RX ORDER — NAPROXEN 500 MG/1
500 TABLET ORAL 2 TIMES DAILY WITH MEALS
Qty: 30 TABLET | Refills: 0 | Status: SHIPPED | OUTPATIENT
Start: 2018-04-13 | End: 2018-04-13 | Stop reason: CLARIF

## 2018-04-13 NOTE — LETTER
April 13, 2018     Patient: Sarthak Erickson   YOB: 1965   Date of Visit: 4/13/2018       To Whom it May Concern:    Sarthak Erickson is under my professional care  She was seen in my office on 4/13/2018  She may return to work on 4/16/18 without restrictions  Please allow a 15minute break every 2 hours for the patient to rest and ice her knee  After 4/27/18, patient may return to normal work hours without extra breaks  She may wear her knee brace as needed       If you have any questions or concerns, please don't hesitate to call           Sincerely,        Eldridge Goodell PA-C      CC: Sarthak Erickson

## 2018-04-13 NOTE — PROGRESS NOTES
Assessment/Plan:    1  Acute injury of right anterior cruciate ligament, initial encounter  Ambulatory referral to Physical Therapy     Discussion:  - continue PT and transition to home exercises  -continue with naproxen and tylenol as needed for pain   -continue with knee brace as needed for comfort  Return in about 1 month (around 5/13/2018)  Subjective     History of Present Illness   Chief Complaint   Patient presents with    Right Knee - Follow-up       Maisha Phillips is a 46 y o  female who presents   for follow-up of right partial ACL tear  She had stopped physical therapy since her last visit during her aggravating her knee  She has been taking naproxen which has seem to be helping  She states she still is having some medial and posterior knee pain  Pain does not radiate  Her knee does feel stable and she is wearing the knee brace  States her knee does feel unstable watch not any brace  [employer] software was used to dictate this note  It may contain errors with dictating incorrect words/spelling  Please contact physician directly for any questions       Review of Systems  Constitutional: negative  Eyes: negative  Respiratory: negative- no audible wheezing   Musculoskeletal: as noted in HPI  Neurological: negative for numbness, tingling, strength intact   Behavioral/Psych: negative    History:    Past Medical History:   Diagnosis Date    Knee injury     right knee     Past Surgical History:   Procedure Laterality Date    LAPAROSCOPY      Exploratory     Social History   History   Alcohol Use No     History   Drug Use No     History   Smoking Status    Former Smoker   Smokeless Tobacco    Never Used     Family History:   Family History   Problem Relation Age of Onset    Diabetes Mother     Hypertension Mother     Heart disease Mother        Meds/Allergies   Allergies   Allergen Reactions    Penicillin V Anaphylaxis    Penicillins           Objective     /89   Pulse 92   Ht 5' 5" (1 651 m)   Wt 82 9 kg (182 lb 12 8 oz)   BMI 30 42 kg/m²     Exam   Ortho Exam  Procedures  Right Knee Exam    Appearance:  Normal with no swelling or bruising and no obvious joint deformity  Palpation/Tenderness:  Normal joint with no tenderness or effusions  Active Range of Motion:  Flexion: No limitation and Extension: No limitation  Special Tests:  Lachman's Test:  negative  Anterior Drawer Test:  negative  Posterior Drawer Test:  negative  Valgus Stress Test:  negative  Varus Stress Test:  negative

## 2018-04-17 ENCOUNTER — OFFICE VISIT (OUTPATIENT)
Dept: GASTROENTEROLOGY | [place] MEDICAL CENTER | Age: 53
End: 2018-04-17
Payer: COMMERCIAL

## 2018-04-17 VITALS
HEART RATE: 92 BPM | TEMPERATURE: 98 F | DIASTOLIC BLOOD PRESSURE: 84 MMHG | BODY MASS INDEX: 31.32 KG/M2 | WEIGHT: 188 LBS | HEIGHT: 65 IN | SYSTOLIC BLOOD PRESSURE: 118 MMHG

## 2018-04-17 DIAGNOSIS — Z12.11 SCREENING FOR COLON CANCER: ICD-10-CM

## 2018-04-17 PROCEDURE — 99243 OFF/OP CNSLTJ NEW/EST LOW 30: CPT | Performed by: INTERNAL MEDICINE

## 2018-04-17 NOTE — PROGRESS NOTES
[facility] - Outpatient Consultation  Silvio Calix 46 y o  female MRN: [id number]  Encounter: [id number]      PCP: Hilda Parra DO  Referring: Hilda Parra DO  [address]      ASSESSMENT AND PLAN:      1  Screening for colon cancer  Her recent colonoscopy was reviewed at [facility] with adequate preparation and no polyps visualized, performed two years ago  There is no indication to repeat colonoscopy at this time  She is due for repeat screening in 2026  Her abdominal pain has spontaneously resolved with treatment of underlying constipation  She has no other warning signs or symptoms   - Ambulatory referral to Gastroenterology    ______________________________________________________________________    HPI:      Patient is a 63-year-old female referred to me for screening colonoscopy  She has a past medical history of Bell's palsy, pulmonary nodule and atypical chest pain which is being evaluated by her PCP  She relates infrequent lower abdominal pain which is cramping in nature  The pain is relieved with a bowel movement  She relates occasional constipation, which spontaneously resolved  Otherwise she is normally moving her bowels with soft bowel movements daily  She states she had a screening colonoscopy performed at [facility] in 2015  Review of the records demonstrates that the preparation for this procedure was adequate with complete exam to the cecum  One small rectal polyp was removed which is found to be hyperplastic on [facility]  Primarily she is concerned with her workup for Bell's palsy which may include a spinal tap  REVIEW OF SYSTEMS:    CONSTITUTIONAL: Denies any fever, chills, rigors, and weight loss  HEENT: No earache or tinnitus  Denies hearing loss or visual disturbances  CARDIOVASCULAR: No chest pain or palpitations     RESPIRATORY: Denies any cough, hemoptysis, shortness of breath or dyspnea on exertion  GASTROINTESTINAL: As noted in the History of Present Illness  GENITOURINARY: No problems with urination  Denies any hematuria or dysuria  NEUROLOGIC: No dizziness or vertigo, denies headaches  MUSCULOSKELETAL: Denies any muscle or joint pain  SKIN: Denies skin rashes or itching  ENDOCRINE: Denies excessive thirst  Denies intolerance to heat or cold  PSYCHOSOCIAL: Denies depression or anxiety  Denies any recent memory loss  Historical Information   Past Medical History:   Diagnosis Date    Knee injury     right knee     Past Surgical History:   Procedure Laterality Date    LAPAROSCOPY      Exploratory     Social History   History   Alcohol Use No     History   Drug Use No     History   Smoking Status    Former Smoker   Smokeless Tobacco    Never Used     Family History   Problem Relation Age of Onset    Diabetes Mother     Hypertension Mother     Heart disease Mother        Meds/Allergies       Current Outpatient Prescriptions:     naproxen (NAPROSYN) 500 mg tablet    Allergies   Allergen Reactions    Penicillin V Anaphylaxis    Penicillins            Objective     Blood pressure 118/84, pulse 92, temperature 98 °F (36 7 °C), temperature source Tympanic, height 5' 5" (1 651 m), weight 85 3 kg (188 lb), not currently breastfeeding  Body mass index is 31 28 kg/m²  PHYSICAL EXAM:      General Appearance:   Alert, cooperative, no distress   HEENT:   Normocephalic, atraumatic, anicteric      Neck:  Supple, symmetrical, trachea midline   Lungs:   Clear to auscultation bilaterally; no rales, rhonchi or wheezing; respirations unlabored    Heart[de-identified]   Regular rate and rhythm; no murmur, rub, or gallop     Abdomen:   Soft, non-tender, non-distended; normal bowel sounds; no masses, no organomegaly    Genitalia:   Deferred    Rectal:   Deferred    Extremities:  No cyanosis, clubbing or edema    Pulses:  2+ and symmetric    Skin:  No jaundice, rashes, or lesions    Lymph nodes:  No palpable cervical lymphadenopathy        Lab Results:     Lab Results   Component Value Date    WBC 5 24 03/10/2018    HGB 12 7 03/10/2018    HCT 37 9 03/10/2018    MCV 85 03/10/2018     03/10/2018       Lab Results   Component Value Date     03/10/2018    K 4 2 03/10/2018     03/10/2018    CO2 32 03/10/2018    ANIONGAP 6 03/10/2018    BUN 18 03/10/2018    CREATININE 0 87 03/10/2018    GLUCOSE 96 03/10/2018    CALCIUM 9 4 03/10/2018    AST 13 10/17/2016    ALT 26 10/17/2016    ALKPHOS 47 10/17/2016    PROT 6 9 10/17/2016    BILITOT 0 23 10/17/2016    EGFR 77 03/10/2018       Lab Results   Component Value Date    INR 0 94 03/09/2018    PROTIME 12 6 03/09/2018         Radiology Results:       Colonoscopy 12/22/2015 performed at [facility] by Dr Darrian Medrano: Mary Connell MD    DATE OF PROCEDURE: 12/22/2015    PREOPERATIVE DIAGNOSIS:   Special screening for malignant neoplasms, colon     POSTOPERATIVE DIAGNOSIS:   Rectal polyp  Hemorrhoids    PROCEDURE PERFORMED: Flexible colonoscopy to cecum with cold forceps  ANESTHESIA: IV sedation  ESTIMATED BLOOD LOSS: Minimal  COMPLICATIONS: None  DISPOSITION: Stable to [facility]  PLAN: Follow up in office to review pathology  INDICATION FOR PROCEDURE: Remy Ghotra is a 48y o  year-old female who presented to the office with no GI complaints, no family history of colon cancer and no prior colonoscopy  Informed consent was obtained for a screening colonoscopy  DESCRIPTION OF PROCEDURE: The patient was brought to the GI lab, placed in the left lateral decubitus position  After anesthesia was initiated, a timeout procedure was performed  A digital rectal exam was performed which demonstrated no abnormalities  The colonoscope was introduced into the patient's rectum and navigated to her cecum  Ileocecal valve and cecal cap and appendiceal orifice were visualized  The prep was adequate   Once this area was entered, the colonoscope was slowly withdrawn  All mucosal surfaces were carefully inspected as the colonoscope was being withdrawn  There were no masses, diverticula or vascular malformations noted throughout the entirety of the colon  As the colonoscope was slowly being withdrawn, air was suctioned out of the patient's colon  She was found to have a hyperplastic appearing polyp in the rectum that was removed with cold forceps  She also was found to have hemorrhoids  The colonoscope was removed  The patient tolerated the procedure well, was sent to [facility] in stable condition       DIAGNOSIS : Rectum, polyp; polypectomy: Hyperplastic polyp

## 2018-04-17 NOTE — LETTER
April 19, 2018     Chris Alejandre DO  [address] [facility]  [address] [facility]    Patient: Arlene Keane   YOB: 1965   Date of Visit: 4/17/2018       Dear Dr Marta Maravilla:    Thank you for referring Arlene Keane to me for evaluation  Below are my notes for this consultation  If you have questions, please do not hesitate to call me  I look forward to following your patient along with you  Sincerely,      [facility]  Mobile: [phone]  Available on [email or web address]  [email or web address]           CC: No Recipients  Martita Maddox MD  4/19/2018  7:40 AM  Sign at close encounter  [facility] - Outpatient Consultation  Arlene Keane 46 y o  female MRN: [id number]  Encounter: [id number]      PCP: Chris Alejandre DO  Referring: Chris Alejandre DO  [address] [facility] [address]      ASSESSMENT AND PLAN:      1  Screening for colon cancer  Her recent colonoscopy was reviewed at [facility] with adequate preparation and no polyps visualized, performed two years ago  There is no indication to repeat colonoscopy at this time  She is due for repeat screening in 2026  Her abdominal pain has spontaneously resolved with treatment of underlying constipation  She has no other warning signs or symptoms   - Ambulatory referral to Gastroenterology    ______________________________________________________________________    HPI:      Patient is a 28-year-old female referred to me for screening colonoscopy  She has a past medical history of Bell's palsy, pulmonary nodule and atypical chest pain which is being evaluated by her PCP  She relates infrequent lower abdominal pain which is cramping in nature  The pain is relieved with a bowel movement  She relates occasional constipation, which spontaneously resolved  Otherwise she is normally moving her bowels with soft bowel movements daily       She states she had a screening colonoscopy performed at [facility] in 2015  Review of the records demonstrates that the preparation for this procedure was adequate with complete exam to the cecum  One small rectal polyp was removed which is found to be hyperplastic on pathology which is [facility]  Primarily she is concerned with her workup for Bell's palsy which may include a spinal tap  REVIEW OF SYSTEMS:    CONSTITUTIONAL: Denies any fever, chills, rigors, and weight loss  HEENT: No earache or tinnitus  Denies hearing loss or visual disturbances  CARDIOVASCULAR: No chest pain or palpitations  RESPIRATORY: Denies any cough, hemoptysis, shortness of breath or dyspnea on exertion  GASTROINTESTINAL: As noted in the History of Present Illness  GENITOURINARY: No problems with urination  Denies any hematuria or dysuria  NEUROLOGIC: No dizziness or vertigo, denies headaches  MUSCULOSKELETAL: Denies any muscle or joint pain  SKIN: Denies skin rashes or itching  ENDOCRINE: Denies excessive thirst  Denies intolerance to heat or cold  PSYCHOSOCIAL: Denies depression or anxiety  Denies any recent memory loss         Historical Information   Past Medical History:   Diagnosis Date    Knee injury     right knee     Past Surgical History:   Procedure Laterality Date    LAPAROSCOPY      Exploratory     Social History   History   Alcohol Use No     History   Drug Use No     History   Smoking Status    Former Smoker   Smokeless Tobacco    Never Used     Family History   Problem Relation Age of Onset    Diabetes Mother     Hypertension Mother     Heart disease Mother        Meds/Allergies       Current Outpatient Prescriptions:     naproxen (NAPROSYN) 500 mg tablet    Allergies   Allergen Reactions    Penicillin V Anaphylaxis    Penicillins            Objective     Blood pressure 118/84, pulse 92, temperature 98 °F (36 7 °C), temperature source Tympanic, height 5' 5" (1 651 m), weight 85 3 kg (188 lb), not currently breastfeeding  Body mass index is 31 28 kg/m²  PHYSICAL EXAM:      General Appearance:   Alert, cooperative, no distress   HEENT:   Normocephalic, atraumatic, anicteric      Neck:  Supple, symmetrical, trachea midline   Lungs:   Clear to auscultation bilaterally; no rales, rhonchi or wheezing; respirations unlabored    Heart[de-identified]   Regular rate and rhythm; no murmur, rub, or gallop  Abdomen:   Soft, non-tender, non-distended; normal bowel sounds; no masses, no organomegaly    Genitalia:   Deferred    Rectal:   Deferred    Extremities:  No cyanosis, clubbing or edema    Pulses:  2+ and symmetric    Skin:  No jaundice, rashes, or lesions    Lymph nodes:  No palpable cervical lymphadenopathy        Lab Results:     Lab Results   Component Value Date    WBC 5 24 03/10/2018    HGB 12 7 03/10/2018    HCT 37 9 03/10/2018    MCV 85 03/10/2018     03/10/2018       Lab Results   Component Value Date     03/10/2018    K 4 2 03/10/2018     03/10/2018    CO2 32 03/10/2018    ANIONGAP 6 03/10/2018    BUN 18 03/10/2018    CREATININE 0 87 03/10/2018    GLUCOSE 96 03/10/2018    CALCIUM 9 4 03/10/2018    AST 13 10/17/2016    ALT 26 10/17/2016    ALKPHOS 47 10/17/2016    PROT 6 9 10/17/2016    BILITOT 0 23 10/17/2016    EGFR 77 03/10/2018       Lab Results   Component Value Date    INR 0 94 03/09/2018    PROTIME 12 6 03/09/2018         Radiology Results:       Colonoscopy 12/22/2015 performed at [facility] by Dr mEa Turcios: Karla Enamorado MD    DATE OF PROCEDURE: 12/22/2015    PREOPERATIVE DIAGNOSIS:   Special screening for malignant neoplasms, colon     POSTOPERATIVE DIAGNOSIS:   Rectal polyp  Hemorrhoids    PROCEDURE PERFORMED: Flexible colonoscopy to cecum with cold forceps  ANESTHESIA: IV sedation  ESTIMATED BLOOD LOSS: Minimal  COMPLICATIONS: None  DISPOSITION: Stable to [facility]  PLAN: Follow up in office to review pathology     INDICATION FOR PROCEDURE: Michael montes a 48y o  year-old female who presented to the office with no GI complaints, no family history of colon cancer and no prior colonoscopy  Informed consent was obtained for a screening colonoscopy  DESCRIPTION OF PROCEDURE: The patient was brought to the GI lab, placed in the left lateral decubitus position  After anesthesia was initiated, a timeout procedure was performed  A digital rectal exam was performed which demonstrated no abnormalities  The colonoscope was introduced into the patient's rectum and navigated to her cecum  Ileocecal valve and cecal cap and appendiceal orifice were visualized  The prep was adequate  Once this area was entered, the colonoscope was slowly withdrawn  All mucosal surfaces were carefully inspected as the colonoscope was being withdrawn  There were no masses, diverticula or vascular malformations noted throughout the entirety of the colon  As the colonoscope was slowly being withdrawn, air was suctioned out of the patient's colon  She was found to have a hyperplastic appearing polyp in the rectum that was removed with cold forceps  She also was found to have hemorrhoids  The colonoscope was removed  The patient tolerated the procedure well, was sent to [facility] in stable condition       DIAGNOSIS : Rectum, polyp; polypectomy: Hyperplastic polyp

## 2018-04-19 ENCOUNTER — OFFICE VISIT (OUTPATIENT)
Dept: FAMILY MEDICINE CLINIC | [place] CLINIC | Age: 53
End: 2018-04-19
Payer: COMMERCIAL

## 2018-04-19 VITALS
WEIGHT: 182.7 LBS | TEMPERATURE: 98.2 F | DIASTOLIC BLOOD PRESSURE: 90 MMHG | OXYGEN SATURATION: 99 % | BODY MASS INDEX: 30.4 KG/M2 | HEART RATE: 83 BPM | SYSTOLIC BLOOD PRESSURE: 140 MMHG

## 2018-04-19 DIAGNOSIS — R07.9 CHEST PAIN, UNSPECIFIED TYPE: Primary | ICD-10-CM

## 2018-04-19 DIAGNOSIS — R20.2 PARESTHESIA OF BOTH HANDS: ICD-10-CM

## 2018-04-19 DIAGNOSIS — G51.0 BELL'S PALSY: ICD-10-CM

## 2018-04-19 PROCEDURE — 99214 OFFICE O/P EST MOD 30 MIN: CPT | Performed by: FAMILY MEDICINE

## 2018-04-19 NOTE — PROGRESS NOTES
Assessment/Plan:   1  Chest pain, unspecified type  Reviewed patient's symptoms with her  Reviewed her EKG which was previously checked while she was inpatient  At this time, at her previous visit she was scheduled for a nuclear stress test   This will complete completed next month  She is significantly concerned today as there is "something going on" and she states that she does not want to wait  All of her symptoms may likely very well be secondary to significant anxiety disorder  Patient stated adamantly that she is not willing to start any medications for any condition  She is highly resistant to suggestions today  Will refer patient to Cardiology to further evaluate this problem  - Ambulatory referral to Cardiology; Future    2  Paresthesia of both hands  It appears that patient's symptoms are likely secondary to carpal tunnel  Check upper extremity EMG is to further evaluate any neurologic deficit  - EMG 2 Limb Upper Extremity; Future    3  Bell's palsy  Reviewed patient's previous records again with her today  She was specifically shown all records as well as imaging tests and shown that there was no sign of a CVA  It is likely that her symptoms were secondary to Bell's palsy  There are no diagnoses linked to this encounter  Subjective:    Chief Complaint   Patient presents with    Follow-up     1 month        Patient ID: Asya Flaherty is a 46 y o  female  Patient is a 59-year-old female presents today with multiple complaints  She is here for her 1 month follow-up  She states that she has been in to see her neurologist recently  She states that that her symptoms were all secondary to Bell's palsy  She was informed also that her testing was all negative for signs of CVA  Patient however is still convinced that she has had a stroke  She has been having persistent chest pressure  Review of Systems   Constitutional: Negative for activity change, chills, fatigue and fever  HENT: Negative for congestion, ear pain, sinus pressure and sore throat  Eyes: Negative for redness, itching and visual disturbance  Respiratory: Negative for cough and shortness of breath  Cardiovascular: Positive for chest pain  Negative for palpitations  Gastrointestinal: Negative for abdominal pain, diarrhea and nausea  Endocrine: Negative for cold intolerance and heat intolerance  Genitourinary: Negative for dysuria, flank pain and frequency  Musculoskeletal: Negative for arthralgias, back pain, gait problem and myalgias  Skin: Negative for color change  Allergic/Immunologic: Negative for environmental allergies  Neurological: Negative for dizziness, numbness and headaches  Psychiatric/Behavioral: Negative for behavioral problems and sleep disturbance  The following portions of the patient's history were reviewed and updated as appropriate : past family history, past medical history, past social history and past surgical history  Objective:    Vitals:    04/19/18 1108   BP: 140/90   BP Location: Left arm   Patient Position: Sitting   Pulse: 83   Temp: 98 2 °F (36 8 °C)   TempSrc: Tympanic   SpO2: 99%   Weight: 82 9 kg (182 lb 11 2 oz)        Physical Exam   Constitutional: She is oriented to person, place, and time  She appears well-developed and well-nourished  HENT:   Head: Normocephalic and atraumatic  Nose: Nose normal    Mouth/Throat: No oropharyngeal exudate  Eyes: Pupils are equal, round, and reactive to light  Right eye exhibits no discharge  Left eye exhibits no discharge  Neck: Normal range of motion  Neck supple  No tracheal deviation present  Cardiovascular: Normal rate, regular rhythm and intact distal pulses  Exam reveals no gallop and no friction rub  No murmur heard  Pulses:       Dorsalis pedis pulses are 2+ on the right side, and 2+ on the left side  Posterior tibial pulses are 2+ on the right side, and 2+ on the left side  Pulmonary/Chest: Effort normal and breath sounds normal  No respiratory distress  She has no wheezes  She has no rales  Abdominal: Soft  Bowel sounds are normal  She exhibits no distension  There is no tenderness  There is no rebound and no guarding  Musculoskeletal: Normal range of motion  She exhibits no edema  Lymphadenopathy:        Head (right side): No submental and no submandibular adenopathy present  Head (left side): No submental and no submandibular adenopathy present  She has no cervical adenopathy  Right cervical: No superficial cervical, no deep cervical and no posterior cervical adenopathy present  Left cervical: No superficial cervical, no deep cervical and no posterior cervical adenopathy present  Neurological: She is alert and oriented to person, place, and time  No cranial nerve deficit or sensory deficit  Skin: Skin is warm, dry and intact  Psychiatric: Her speech is normal and behavior is normal  Judgment normal  Her mood appears not anxious  Cognition and memory are normal  She does not exhibit a depressed mood  Vitals reviewed

## 2018-05-11 ENCOUNTER — OFFICE VISIT (OUTPATIENT)
Dept: CARDIOLOGY CLINIC | [place] CLINIC | Age: 53
End: 2018-05-11
Payer: COMMERCIAL

## 2018-05-11 VITALS
BODY MASS INDEX: 30.16 KG/M2 | WEIGHT: 181 LBS | DIASTOLIC BLOOD PRESSURE: 86 MMHG | RESPIRATION RATE: 14 BRPM | SYSTOLIC BLOOD PRESSURE: 136 MMHG | HEIGHT: 65 IN | HEART RATE: 71 BPM

## 2018-05-11 DIAGNOSIS — R07.9 CHEST PAIN, UNSPECIFIED TYPE: Primary | ICD-10-CM

## 2018-05-11 DIAGNOSIS — R00.2 PALPITATIONS: ICD-10-CM

## 2018-05-11 DIAGNOSIS — I44.0 FIRST DEGREE AV BLOCK: ICD-10-CM

## 2018-05-11 PROCEDURE — 99244 OFF/OP CNSLTJ NEW/EST MOD 40: CPT | Performed by: INTERNAL MEDICINE

## 2018-05-11 PROCEDURE — 93000 ELECTROCARDIOGRAM COMPLETE: CPT | Performed by: INTERNAL MEDICINE

## 2018-05-11 NOTE — PROGRESS NOTES
Cardiology Consultation     Verna Median  [id number]  1965  [address] [facility] [address]    1  Chest pain, unspecified type  Ambulatory referral to Cardiology    POCT ECG    Echo complete with contrast if indicated    Holter monitor - 24 hour    Lipid Panel With Direct LDL   2  Palpitations  Echo complete with contrast if indicated    Holter monitor - 24 hour   3  First degree AV block  Echo complete with contrast if indicated    Holter monitor - 24 hour     Discussion/Summary:  Chest pain:  Somewhat atypical   Unclear etiology  She artery has a nuclear stress test scheduled for next week  Check fasting lipid profile  Check echocardiogram   There was slightly poor anterior R-wave progression noted on the EKG, but no clear other evidence of prior MI  She has palpitations  Check Holter monitor  24 hr     Other blood work was reviewed  Advised minimizing NSAIDs as possible given rising blood pressure  I also of some concern the some of her symptoms may be related to reflux  Advised treatment for this as she was previously on as well  Follow up after testing  History of Present Illness:    48year old female with recent Bell's palsy  Since then, reports symptoms of chest pressure, tingling of her chest, discomfort under her left breast  These symptoms are intermittent  She has a nuclear   Stress test which is ordered already, and is scheduled for next week  She comes to the office today, referred by Dr Julianna Price for these symptoms  Additionally, she notices palpitations  She doesn't participate in formal exercise program, but is active at work  She gets symptoms of some   Shortness of breath when walking up the flight of stairs  She has a family history of early CAD  Her mother had MIs in her 46s  Brother as well with MIs at a young age      She reports other concerns as well - tingling and numbness of the arm, mostly when sleeping  She has to shake her arm out to get sensation back  Her Bell's palsy improved after treatment with steroids  She has some vision changes with blurry vision which improved  History of reflux  She takes Zantac, used to take PPI  She has seen neurology  Recently had screening colonoscopy as well  For the arm symptoms, she's been taking Naprosyn every night for at least a week  Blood pressure recently been higher whereas it was better controlled before  Patient Active Problem List   Diagnosis    Arthritis right knee    Pulmonary nodule    Chest pain    Bell's palsy    Acute injury of anterior cruciate ligament of right knee    Paresthesia of both hands    Palpitations     Past Medical History:   Diagnosis Date    Knee injury     right knee     Social History     Social History    Marital status: /Civil Union     Spouse name: N/A    Number of children: N/A    Years of education: N/A     Occupational History    Not on file       Social History Main Topics    Smoking status: Former Smoker    Smokeless tobacco: Never Used    Alcohol use No    Drug use: No    Sexual activity: Not on file     Other Topics Concern    Not on file     Social History Narrative    Uses safety equipment: seatbelt      Family History   Problem Relation Age of Onset    Diabetes Mother     Hypertension Mother     Heart disease Mother      Past Surgical History:   Procedure Laterality Date    LAPAROSCOPY      Exploratory       Current Outpatient Prescriptions:     naproxen (NAPROSYN) 500 mg tablet, Take 1 tablet (500 mg total) by mouth 2 (two) times a day with meals for 5 days, Disp: 10 tablet, Rfl: 0  Allergies   Allergen Reactions    Penicillin V Anaphylaxis    Penicillins        Vitals:    05/11/18 1029   BP: 136/86   BP Location: Left arm   Patient Position: Sitting   Cuff Size: Standard   Pulse: 71   Resp: 14   Weight: 82 1 kg (181 lb)   Height: 5' 5" (1 651 m) Vitals:    05/11/18 1029   Weight: 82 1 kg (181 lb)      Height: 5' 5" (165 1 cm)   Body mass index is 30 12 kg/m²  Physical Exam:  GENERAL: Alert, well appearing, and in no distress  HEENT:  PERRL, EOMI, no scleral icterus, no conjunctival pallor  NECK:  Supple, No elevated JVP, no thyromegaly, no carotid bruits  HEART:  Regular rate and rhythm, normal S1/S2, no S3/S4, no murmur or rub  LUNGS:  Clear to auscultation bilaterally  ABDOMEN:  Soft, non-tender, positive bowel sounds, no rebound or guarding  EXTREMITIES:  No edema  VASCULAR:  Normal pedal pulses   NEURO: No focal deficits,  SKIN: Normal without suspicious lesions on exposed skin    ROS:  Paper ROS reviewed, see scanned in chart  Labs:  Lab Results   Component Value Date     03/10/2018    K 4 2 03/10/2018     03/10/2018    CREATININE 0 87 03/10/2018    BUN 18 03/10/2018    CO2 32 03/10/2018    ALT 26 10/17/2016    AST 13 10/17/2016    INR 0 94 03/09/2018    HGBA1C 5 3 03/10/2018    WBC 5 24 03/10/2018    HGB 12 7 03/10/2018    HCT 37 9 03/10/2018     03/10/2018     No results found for: CHOL  No results found for: HDL  No results found for: LDLCALC  No results found for: TRIG    EKG:  Sinus rhythm  71 beats per minute  Poor anterior R-wave progression  First-degree AV block

## 2018-05-11 NOTE — LETTER
May 11, 2018     Carolynn Larry DO  [address] [facility]  [address] [facility]    Patient: Yrn Pollack   YOB: 1965   Date of Visit: 5/11/2018       Dear Dr Wilson Slice:    Thank you for referring Yrn Pollack to me for evaluation  Below are my notes for this consultation  If you have questions, please do not hesitate to call me  I look forward to following your patient along with you  Sincerely,        Denisa Colunga MD        CC: No Recipients  Denisa Colunga MD  5/11/2018 11:35 AM  Sign at close encounter                                             Cardiology Consultation     Yrn Pollack  [id number]  1965  [address] [facility] [address] [facility]    1  Chest pain, unspecified type  Ambulatory referral to Cardiology    POCT ECG    Echo complete with contrast if indicated    Holter monitor - 24 hour    Lipid Panel With Direct LDL   2  Palpitations  Echo complete with contrast if indicated    Holter monitor - 24 hour   3  First degree AV block  Echo complete with contrast if indicated    Holter monitor - 24 hour     Discussion/Summary:  Chest pain:  Somewhat atypical   Unclear etiology  She artery has a nuclear stress test scheduled for next week  Check fasting lipid profile  Check echocardiogram   There was slightly poor anterior R-wave progression noted on the EKG, but no clear other evidence of prior MI  She has palpitations  Check Holter monitor  24 hr     Other blood work was reviewed  Advised minimizing NSAIDs as possible given rising blood pressure  I also of some concern the some of her symptoms may be related to reflux  Advised treatment for this as she was previously on as well  Follow up after testing  History of Present Illness:    48year old female with recent Bell's palsy   Since then, reports symptoms of chest pressure, tingling of her chest, discomfort under her left breast  These symptoms are intermittent  She has a nuclear   Stress test which is ordered already, and is scheduled for next week  She comes to the office today, referred by Dr Howard Salcido for these symptoms  Additionally, she notices palpitations  She doesn't participate in formal exercise program, but is active at work  She gets symptoms of some   Shortness of breath when walking up the flight of stairs  She has a family history of early CAD  Her mother had MIs in her 46s  Brother as well with MIs at a young age  She reports other concerns as well - tingling and numbness of the arm, mostly when sleeping  She has to shake her arm out to get sensation back  Her Bell's palsy improved after treatment with steroids  She has some vision changes with blurry vision which improved  History of reflux  She takes Zantac, used to take PPI  She has seen neurology  Recently had screening colonoscopy as well  For the arm symptoms, she's been taking Naprosyn every night for at least a week  Blood pressure recently been higher whereas it was better controlled before  Patient Active Problem List   Diagnosis    Arthritis right knee    Pulmonary nodule    Chest pain    Bell's palsy    Acute injury of anterior cruciate ligament of right knee    Paresthesia of both hands    Palpitations     Past Medical History:   Diagnosis Date    Knee injury     right knee     Social History     Social History    Marital status: /Civil Union     Spouse name: N/A    Number of children: N/A    Years of education: N/A     Occupational History    Not on file       Social History Main Topics    Smoking status: Former Smoker    Smokeless tobacco: Never Used    Alcohol use No    Drug use: No    Sexual activity: Not on file     Other Topics Concern    Not on file     Social History Narrative    Uses safety equipment: seatbelt      Family History   Problem Relation Age of Onset    Diabetes Mother     Hypertension Mother     Heart disease Mother      Past Surgical History:   Procedure Laterality Date    LAPAROSCOPY      Exploratory       Current Outpatient Prescriptions:     naproxen (NAPROSYN) 500 mg tablet, Take 1 tablet (500 mg total) by mouth 2 (two) times a day with meals for 5 days, Disp: 10 tablet, Rfl: 0  Allergies   Allergen Reactions    Penicillin V Anaphylaxis    Penicillins        Vitals:    05/11/18 1029   BP: 136/86   BP Location: Left arm   Patient Position: Sitting   Cuff Size: Standard   Pulse: 71   Resp: 14   Weight: 82 1 kg (181 lb)   Height: 5' 5" (1 651 m)     Vitals:    05/11/18 1029   Weight: 82 1 kg (181 lb)      Height: 5' 5" (165 1 cm)   Body mass index is 30 12 kg/m²  Physical Exam:  GENERAL: Alert, well appearing, and in no distress  HEENT:  PERRL, EOMI, no scleral icterus, no conjunctival pallor  NECK:  Supple, No elevated JVP, no thyromegaly, no carotid bruits  HEART:  Regular rate and rhythm, normal S1/S2, no S3/S4, no murmur or rub  LUNGS:  Clear to auscultation bilaterally  ABDOMEN:  Soft, non-tender, positive bowel sounds, no rebound or guarding  EXTREMITIES:  No edema  VASCULAR:  Normal pedal pulses   NEURO: No focal deficits,  SKIN: Normal without suspicious lesions on exposed skin    ROS:  Paper ROS reviewed, see scanned in chart  Labs:  Lab Results   Component Value Date     03/10/2018    K 4 2 03/10/2018     03/10/2018    CREATININE 0 87 03/10/2018    BUN 18 03/10/2018    CO2 32 03/10/2018    ALT 26 10/17/2016    AST 13 10/17/2016    INR 0 94 03/09/2018    HGBA1C 5 3 03/10/2018    WBC 5 24 03/10/2018    HGB 12 7 03/10/2018    HCT 37 9 03/10/2018     03/10/2018     No results found for: CHOL  No results found for: HDL  No results found for: LDLCALC  No results found for: TRIG    EKG:  Sinus rhythm  71 beats per minute  Poor anterior R-wave progression  First-degree AV block

## 2018-05-11 NOTE — LETTER
May 11, 2018     Patient: Silvio Calix   YOB: 1965   Date of Visit: 5/11/2018       To Whom it May Concern:    Silvio Calix is under my professional care  She was seen in my office on 5/11/2018  She may return to work on 5/11/18  If you have any questions or concerns, please don't hesitate to call           Sincerely,          Abelino Gamboa MD        CC: No Recipients

## 2018-05-14 ENCOUNTER — OFFICE VISIT (OUTPATIENT)
Dept: OBGYN CLINIC | [place] MEDICAL CENTER | Age: 53
End: 2018-05-14
Payer: COMMERCIAL

## 2018-05-14 VITALS
BODY MASS INDEX: 29.85 KG/M2 | WEIGHT: 179.2 LBS | DIASTOLIC BLOOD PRESSURE: 86 MMHG | HEART RATE: 98 BPM | SYSTOLIC BLOOD PRESSURE: 125 MMHG | HEIGHT: 65 IN

## 2018-05-14 DIAGNOSIS — M19.90 ARTHRITIS: Primary | ICD-10-CM

## 2018-05-14 DIAGNOSIS — S89.91XA ACUTE INJURY OF RIGHT ANTERIOR CRUCIATE LIGAMENT, INITIAL ENCOUNTER: ICD-10-CM

## 2018-05-14 PROCEDURE — 99213 OFFICE O/P EST LOW 20 MIN: CPT | Performed by: PHYSICIAN ASSISTANT

## 2018-05-14 PROCEDURE — 20610 DRAIN/INJ JOINT/BURSA W/O US: CPT | Performed by: PHYSICIAN ASSISTANT

## 2018-05-14 RX ORDER — METHYLPREDNISOLONE ACETATE 40 MG/ML
2 INJECTION, SUSPENSION INTRA-ARTICULAR; INTRALESIONAL; INTRAMUSCULAR; SOFT TISSUE
Status: COMPLETED | OUTPATIENT
Start: 2018-05-14 | End: 2018-05-14

## 2018-05-14 RX ORDER — BUPIVACAINE HYDROCHLORIDE 2.5 MG/ML
1 INJECTION, SOLUTION INFILTRATION; PERINEURAL
Status: COMPLETED | OUTPATIENT
Start: 2018-05-14 | End: 2018-05-14

## 2018-05-14 RX ADMIN — METHYLPREDNISOLONE ACETATE 2 ML: 40 INJECTION, SUSPENSION INTRA-ARTICULAR; INTRALESIONAL; INTRAMUSCULAR; SOFT TISSUE at 17:39

## 2018-05-14 RX ADMIN — BUPIVACAINE HYDROCHLORIDE 1 ML: 2.5 INJECTION, SOLUTION INFILTRATION; PERINEURAL at 17:39

## 2018-05-14 NOTE — LETTER
May 14, 2018     Patient: Jason Archer   YOB: 1965   Date of Visit: 5/14/2018       To Whom it May Concern:    Jason Archer is under my professional care  She was seen in my office on 5/14/2018  She may return to work on 5/15/18 without restrictions   If you have any questions or concerns, please don't hesitate to call           Sincerely,          Yo Cintron PA-C      CC: Jason Archer

## 2018-05-14 NOTE — PROGRESS NOTES
Assessment/Plan:    1  Arthritis right knee     2  Acute injury of right anterior cruciate ligament, initial encounter       Discussion:  -patient received a right knee steroid injection today  She should avoid strenuous activities rest and ice her knee for 1-2 days   -she will start physical therapy for her right knee  -she will continue with naproxen as needed for pain control  Return if symptoms worsen or fail to improve  Subjective    History of Present Illness   Chief Complaint   Patient presents with    Right Knee - Follow-up       Asya Flaherty is a 48 y o  female who presents  For follow-up of partial ACL tear  Since her last visit she has returned to work on full duty without difficulty  She states she still occasionally has some pain above her kneecap and posterior knee  The pain does not radiate  The pain is worse with some activities  Her knee is stable most of the times but today she did have a bit of instability  She does take naproxen as needed for pain control  Overall she feels she is improving  She has not been doing physical therapy due to her work schedule but plans on making an appointment for next week   [employer] software was used to dictate this note  It may contain errors with dictating incorrect words/spelling  Please contact physician directly for any questions       Review of Systems  Constitutional: negative  Eyes: negative  Respiratory: negative- no audible wheezing   Musculoskeletal: as noted in HPI  Neurological: negative for numbness, tingling, strength intact   Behavioral/Psych: negative    History:    Past Medical History:   Diagnosis Date    Knee injury     right knee     Past Surgical History:   Procedure Laterality Date    LAPAROSCOPY      Exploratory     Social History   History   Alcohol Use No     History   Drug Use No     History   Smoking Status    Former Smoker   Smokeless Tobacco    Never Used     Family History:   Family History   Problem Relation Age of Onset    Diabetes Mother     Hypertension Mother     Heart disease Mother        Meds/Allergies   Allergies   Allergen Reactions    Penicillin V Anaphylaxis    Penicillins           Objective     /86   Pulse 98   Ht 5' 5" (1 651 m)   Wt 81 3 kg (179 lb 3 2 oz)   BMI 29 82 kg/m²     Exam   Ortho Exam  Large joint arthrocentesis  Date/Time: 5/14/2018 5:39 PM  Consent given by: patient  Site marked: site marked  Timeout: Immediately prior to procedure a time out was called to verify the correct patient, procedure, equipment, support staff and site/side marked as required   Supporting Documentation  Indications: pain   Procedure Details  Location: knee - R knee  Needle size: 22 G  Approach: anterolateral  Medications administered: 1 mL bupivacaine 0 25 %; 2 mL methylPREDNISolone acetate 40 mg/mL    Patient tolerance: patient tolerated the procedure well with no immediate complications  Dressing:  Sterile dressing applied      Knee Exam    Appearance:  Normal with no swelling or bruising and no obvious joint deformity  Palpation/Tenderness:  medial joint line tenderness   Active Range of Motion:  Flexion: No limitation and Extension: No limitation  Special Tests:  Lachman's Test:  negative  Anterior Drawer Test:  negative  Posterior Drawer Test:  negative  Valgus Stress Test:  negative  Varus Stress Test:  negative

## 2018-05-16 ENCOUNTER — HOSPITAL ENCOUNTER (OUTPATIENT)
Dept: NUCLEAR MEDICINE | [place] HOSPITAL | Age: 53
Discharge: HOME | End: 2018-05-16
Payer: COMMERCIAL

## 2018-05-16 ENCOUNTER — HOSPITAL ENCOUNTER (OUTPATIENT)
Dept: NON INVASIVE DIAGNOSTICS | [place] HOSPITAL | Age: 53
Discharge: HOME | End: 2018-05-16
Payer: COMMERCIAL

## 2018-05-16 DIAGNOSIS — R07.9 CHEST PAIN, UNSPECIFIED TYPE: ICD-10-CM

## 2018-05-16 PROCEDURE — 93017 CV STRESS TEST TRACING ONLY: CPT

## 2018-05-16 PROCEDURE — A9502 TC99M TETROFOSMIN: HCPCS

## 2018-05-16 PROCEDURE — 78452 HT MUSCLE IMAGE SPECT MULT: CPT

## 2018-05-17 LAB
ARRHY DURING EX: NORMAL
CHEST PAIN STATEMENT: NORMAL
MAX DIASTOLIC BP: 82 MMHG
MAX HEART RATE: 153 BPM
MAX PREDICTED HEART RATE: 167 BPM
MAX. SYSTOLIC BP: 151 MMHG
PROTOCOL NAME: NORMAL
TARGET HR FORMULA: NORMAL
TIME IN EXERCISE PHASE: NORMAL

## 2018-05-18 ENCOUNTER — APPOINTMENT (EMERGENCY)
Dept: RADIOLOGY | [place] HOSPITAL | Age: 53
End: 2018-05-18
Payer: COMMERCIAL

## 2018-05-18 ENCOUNTER — HOSPITAL ENCOUNTER (EMERGENCY)
[place] HOSPITAL | Age: 53
Discharge: HOME | End: 2018-05-18
Attending: EMERGENCY MEDICINE | Admitting: EMERGENCY MEDICINE
Payer: COMMERCIAL

## 2018-05-18 VITALS
BODY MASS INDEX: 29.12 KG/M2 | HEART RATE: 92 BPM | DIASTOLIC BLOOD PRESSURE: 78 MMHG | SYSTOLIC BLOOD PRESSURE: 116 MMHG | WEIGHT: 175 LBS | TEMPERATURE: 98.1 F | OXYGEN SATURATION: 99 % | RESPIRATION RATE: 16 BRPM

## 2018-05-18 DIAGNOSIS — J40 BRONCHITIS: Primary | ICD-10-CM

## 2018-05-18 DIAGNOSIS — E86.0 DEHYDRATION: ICD-10-CM

## 2018-05-18 LAB
ANION GAP SERPL CALCULATED.3IONS-SCNC: 5 MMOL/L (ref 4–13)
BILIRUB UR QL STRIP: ABNORMAL
BUN SERPL-MCNC: 12 MG/DL (ref 5–25)
CALCIUM SERPL-MCNC: 9.2 MG/DL (ref 8.3–10.1)
CHLORIDE SERPL-SCNC: 105 MMOL/L (ref 100–108)
CLARITY UR: CLEAR
CO2 SERPL-SCNC: 30 MMOL/L (ref 21–32)
COLOR UR: YELLOW
COLOR, POC: YELLOW
CREAT SERPL-MCNC: 0.7 MG/DL (ref 0.6–1.3)
GFR SERPL CREATININE-BSD FRML MDRD: 99 ML/MIN/1.73SQ M
GLUCOSE SERPL-MCNC: 107 MG/DL (ref 65–140)
GLUCOSE UR STRIP-MCNC: NEGATIVE MG/DL
HGB UR QL STRIP.AUTO: NEGATIVE
KETONES UR STRIP-MCNC: NEGATIVE MG/DL
LEUKOCYTE ESTERASE UR QL STRIP: NEGATIVE
NITRITE UR QL STRIP: NEGATIVE
PH UR STRIP.AUTO: 5.5 [PH] (ref 4.5–8)
POTASSIUM SERPL-SCNC: 3.9 MMOL/L (ref 3.5–5.3)
PROT UR STRIP-MCNC: NEGATIVE MG/DL
SODIUM SERPL-SCNC: 140 MMOL/L (ref 136–145)
SP GR UR STRIP.AUTO: 1.02 (ref 1–1.03)
UROBILINOGEN UR QL STRIP.AUTO: 0.2 E.U./DL

## 2018-05-18 PROCEDURE — 96374 THER/PROPH/DIAG INJ IV PUSH: CPT

## 2018-05-18 PROCEDURE — 96361 HYDRATE IV INFUSION ADD-ON: CPT

## 2018-05-18 PROCEDURE — 36415 COLL VENOUS BLD VENIPUNCTURE: CPT | Performed by: EMERGENCY MEDICINE

## 2018-05-18 PROCEDURE — 81003 URINALYSIS AUTO W/O SCOPE: CPT

## 2018-05-18 PROCEDURE — 71046 X-RAY EXAM CHEST 2 VIEWS: CPT

## 2018-05-18 PROCEDURE — 80048 BASIC METABOLIC PNL TOTAL CA: CPT | Performed by: EMERGENCY MEDICINE

## 2018-05-18 PROCEDURE — 99285 EMERGENCY DEPT VISIT HI MDM: CPT

## 2018-05-18 RX ORDER — KETOROLAC TROMETHAMINE 30 MG/ML
15 INJECTION, SOLUTION INTRAMUSCULAR; INTRAVENOUS ONCE
Status: COMPLETED | OUTPATIENT
Start: 2018-05-18 | End: 2018-05-18

## 2018-05-18 RX ORDER — BENZONATATE 100 MG/1
100 CAPSULE ORAL 3 TIMES DAILY PRN
Qty: 21 CAPSULE | Refills: 0 | Status: SHIPPED | OUTPATIENT
Start: 2018-05-18 | End: 2018-05-25

## 2018-05-18 RX ORDER — GUAIFENESIN 600 MG
600 TABLET, EXTENDED RELEASE 12 HR ORAL 2 TIMES DAILY
Qty: 10 TABLET | Refills: 0 | Status: SHIPPED | OUTPATIENT
Start: 2018-05-18 | End: 2018-05-23

## 2018-05-18 RX ORDER — NAPROXEN 250 MG/1
250 TABLET ORAL
Qty: 21 TABLET | Refills: 0 | Status: SHIPPED | OUTPATIENT
Start: 2018-05-18 | End: 2018-08-30 | Stop reason: ALTCHOICE

## 2018-05-18 RX ADMIN — SODIUM CHLORIDE 1000 ML: 0.9 INJECTION, SOLUTION INTRAVENOUS at 16:51

## 2018-05-18 RX ADMIN — KETOROLAC TROMETHAMINE 15 MG: 30 INJECTION, SOLUTION INTRAMUSCULAR at 16:52

## 2018-05-18 NOTE — DISCHARGE INSTRUCTIONS
Acute Bronchitis   WHAT YOU NEED TO KNOW:   What is acute bronchitis? Acute bronchitis is swelling and irritation in the air passages of your lungs  This irritation may cause you to cough or have other breathing problems  Acute bronchitis often starts because of another illness, such as a cold or the flu  The illness spreads from your nose and throat to your windpipe and airways  Bronchitis is often called a chest cold  Acute bronchitis lasts about 3 to 6 weeks and is usually not a serious illness  What causes acute bronchitis? · Infection  caused by a virus, bacteria, or a fungus    · Polluted air  caused by chemical fumes, dust, smoke, allergens, or pollution  What increases my risk for acute bronchitis? · Age, usually older adults    · Smoking cigarettes or being around cigarette smoke    · Chronic lung diseases or chronic sinus infections    · Weakened immune system    · Gastroesophageal reflux disease    · Allergies and environmental changes  What are the signs and symptoms of acute bronchitis? · A cough with sputum that may be clear, yellow, or green    · Feeling more tired than usual, and body aches    · A fever and chills    · Wheezing when you breathe    · A tight chest or pain when you breathe or cough  How is acute bronchitis diagnosed? Your healthcare provider may diagnose bronchitis by your symptoms  If he is not sure, you may need the following:  · Blood tests  will be done to see if your symptoms are caused by an infection  · X-ray  pictures of your lungs and heart may show signs of infection, such as pneumonia  Chest x-rays may also show fluid around your heart and lungs  How is acute bronchitis treated? Your healthcare provider will treat any condition that has caused your acute bronchitis  He may also give you any of the following:  · Ibuprofen or acetaminophen  are medicines that help lower your fever  They are available without a doctor's order   Ask your healthcare provider which medicine is right for you  Ask how much to take and how often to take it  Follow directions  These medicines can cause stomach bleeding if not taken correctly  Ibuprofen can cause kidney damage  Do not take ibuprofen if you have kidney disease, an ulcer, or allergies to aspirin  Acetaminophen can cause liver damage  Do not take more than 4,000 milligrams in 24 hours  · Decongestants  help loosen mucus in your lungs and make it easier to cough up  This can help you breathe easier  · Cough suppressants  decrease your urge to cough  If your cough produces mucus, do not take a cough suppressant unless your healthcare provider tells you to  Your healthcare provider may suggest that you take a cough suppressant at night so you can rest     · Inhalers  may be given  Your healthcare provider may give you one or more inhalers to help you breathe easier and cough less  An inhaler gives your medicine to open your airways  Ask your healthcare provider to show you how to use your inhaler correctly  How can I care for myself when I have acute bronchitis? · Get more rest   Rest helps your body to heal  Slowly start to do more each day  Rest when you feel it is needed  · Avoid irritants in the air  Avoid chemicals, fumes, and dust  Wear a face mask if you must work around dust or fumes  Stay inside on days when air pollution levels are high  If you have allergies, stay inside when pollen counts are high  Do not use aerosol products, such as spray-on deodorant, bug spray, and hair spray  · Do not smoke or be around others who smoke  Nicotine and other chemicals in cigarettes and cigars damages the cilia that move mucus out of your lungs  Ask your healthcare provider for information if you currently smoke and need help to quit  E-cigarettes or smokeless tobacco still contain nicotine  Talk to your healthcare provider before you use these products  · Drink liquids as directed    Liquids help keep your air passages moist and help you cough up mucus  You may need to drink more liquids when you have acute bronchitis  Ask how much liquid to drink each day and which liquids are best for you  · Use a humidifier or vaporizer  Use a cool mist humidifier or a vaporizer to increase air moisture in your home  This may make it easier for you to breathe and help decrease your cough  How can I decrease my risk for acute bronchitis? · Get the vaccinations you need  Ask your healthcare provider if you should get vaccinated against the flu or pneumonia  · Prevent the spread of germs  You can decrease your risk of acute bronchitis and other illnesses by doing the following:     [facility] [place] hands often with soap and water  Carry germ-killing hand lotion or gel with you  You can use the lotion or gel to clean your hands when soap and water are not available  ¨ Do not touch your eyes, nose, or mouth unless you have washed your hands first     ¨ Always cover your mouth when you cough to prevent the spread of germs  It is best to cough into a tissue or your shirt sleeve instead of into your hand  Ask those around you cover their mouths when they cough  ¨ Try to avoid people who have a cold or the flu  If you are sick, stay away from others as much as possible  When should I seek immediate care? · You cough up blood  · Your lips or fingernails turn blue  · You feel like you are not getting enough air when you breathe  When should I contact my healthcare provider? · You have a fever  · Your breathing problems do not go away or get worse  · Your cough does not get better within 4 weeks  · You have questions or concerns about your condition or care  CARE AGREEMENT:   You have the right to help plan your care  Learn about your health condition and how it may be treated  Discuss treatment options with your caregivers to decide what care you want to receive  You always have the right to refuse treatment  The above information is an  only  It is not intended as medical advice for individual conditions or treatments  Talk to your doctor, nurse or pharmacist before following any medical regimen to see if it is safe and effective for you  © 2017 [employer] Information is for End User's use only and may not be sold, redistributed or otherwise used for commercial purposes  All illustrations and images included in CareNotes® are the copyrighted property of [employer]  or [employer]

## 2018-05-18 NOTE — ED NOTES
Patient reports she does not want any test results discussed around boyfriend       Michelle Menezes RN  05/18/18 4693

## 2018-05-18 NOTE — ED PROVIDER NOTES
History  Chief Complaint   Patient presents with    Weakness - Generalized     weak, chills, lower back pain, nausea, joints ache, decreased PO intake x 2 days  taking OTC meds w/out improvement  The 1year-old female presents for evaluation of multiple complaints over the past 2 days  Patient was a gradual onset of generalized body ache and malaise  She states her gradually, is constant, without modifying factors  Associated with congestion, sore throat without difficulty swallowing or hoarseness, cough, chills, subjective fever, back pain  Patient denies abdominal pain, chest pain, shortness of breath, neck stiffness, history of IV drug use, rash, recent travel or sick contacts  History provided by:  Patient      Prior to Admission Medications   Prescriptions Last Dose Informant Patient Reported? Taking?   naproxen (NAPROSYN) 500 mg tablet   No No   Sig: Take 1 tablet (500 mg total) by mouth 2 (two) times a day with meals for 5 days      Facility-Administered Medications: None       Past Medical History:   Diagnosis Date    Knee injury     right knee       Past Surgical History:   Procedure Laterality Date    LAPAROSCOPY      Exploratory       Family History   Problem Relation Age of Onset    Diabetes Mother     Hypertension Mother     Heart disease Mother      I have reviewed and agree with the history as documented  Social History   Substance Use Topics    Smoking status: Former Smoker    Smokeless tobacco: Never Used    Alcohol use No        Review of Systems   Constitutional: Positive for chills and fever  Negative for activity change, appetite change and fatigue  HENT: Positive for sore throat  Negative for congestion, dental problem, ear pain and rhinorrhea  Eyes: Negative for pain and redness  Respiratory: Positive for cough  Negative for chest tightness, shortness of breath and wheezing  Cardiovascular: Negative for chest pain and palpitations     Gastrointestinal: Negative for abdominal pain, blood in stool, constipation, diarrhea, nausea and vomiting  Endocrine: Negative for cold intolerance and heat intolerance  Genitourinary: Negative for difficulty urinating, dysuria, frequency, hematuria, vaginal bleeding and vaginal discharge  Musculoskeletal: Positive for arthralgias, back pain and myalgias  Skin: Negative for color change, pallor and rash  Neurological: Negative for weakness and numbness  Hematological: Does not bruise/bleed easily  Psychiatric/Behavioral: Negative for agitation, hallucinations and suicidal ideas  Physical Exam  Physical Exam   Constitutional: She is oriented to person, place, and time  She appears well-developed and well-nourished  HENT:   Mouth/Throat: No oropharyngeal exudate  TMs normal bilaterally +pharyngeal erythema +clear rhinorrhea nontender palpation of sinuses, normal looking turbinates   Eyes: Conjunctivae and EOM are normal    Neck: Normal range of motion  Neck supple  No meningeal signs   Cardiovascular: Normal rate, regular rhythm, normal heart sounds and intact distal pulses  Pulmonary/Chest: Effort normal and breath sounds normal  No respiratory distress  She has no wheezes  She has no rales  She exhibits no tenderness  Abdominal: Soft  Bowel sounds are normal  She exhibits no distension and no mass  There is no tenderness  No hernia  No cvat   Musculoskeletal: Normal range of motion  She exhibits no edema  Lymphadenopathy:     She has no cervical adenopathy  Neurological: She is alert and oriented to person, place, and time  No cranial nerve deficit  Skin: No rash noted  No erythema  No edema   Psychiatric: She has a normal mood and affect  Her behavior is normal    Nursing note and vitals reviewed        Vital Signs  ED Triage Vitals   Temperature Pulse Respirations Blood Pressure SpO2   05/18/18 1334 05/18/18 1334 05/18/18 1334 05/18/18 1334 05/18/18 1334   98 1 °F (36 7 °C) 98 20 150/86 98 % Temp src Heart Rate Source Patient Position - Orthostatic VS BP Location FiO2 (%)   -- 05/18/18 1559 05/18/18 1559 05/18/18 1559 --    Monitor Sitting Right arm       Pain Score       05/18/18 1334       9           Vitals:    05/18/18 1334 05/18/18 1559 05/18/18 1700 05/18/18 1708   BP: 150/86 146/88 116/78    Pulse: 98 (!) 115 84 92   Patient Position - Orthostatic VS:  Sitting Sitting        Visual Acuity      ED Medications  Medications   sodium chloride 0 9 % bolus 1,000 mL (1,000 mL Intravenous New Bag 5/18/18 1651)   ketorolac (TORADOL) injection 15 mg (15 mg Intravenous Given 5/18/18 1652)       Diagnostic Studies  Results Reviewed     Procedure Component Value Units Date/Time    Basic metabolic panel [[id number]] Collected:  05/18/18 1648    Lab Status:  Final result Specimen:  Blood from Arm, Left Updated:  05/18/18 1702     Sodium 140 mmol/L      Potassium 3 9 mmol/L      Chloride 105 mmol/L      CO2 30 mmol/L      Anion Gap 5 mmol/L      BUN 12 mg/dL      Creatinine 0 70 mg/dL      Glucose 107 mg/dL      Calcium 9 2 mg/dL      eGFR 99 ml/min/1 73sq m     Narrative:         National Kidney Disease Education Program recommendations are as follows:  GFR calculation is accurate only with a steady state creatinine  Chronic Kidney disease less than 60 ml/min/1 73 sq  meters  Kidney failure less than 15 ml/min/1 73 sq  meters      POCT urinalysis dipstick [[id number]]  (Normal) Resulted:  05/18/18 1627    Lab Status:  Final result Specimen:  Urine Updated:  05/18/18 1627     Color, UA YELLOW    ED Urine Macroscopic [[id number]]  (Abnormal) Collected:  05/18/18 1629    Lab Status:  Final result Specimen:  Urine Updated:  05/18/18 1626     Color, UA Yellow     Clarity, UA Clear     pH, UA 5 5     Leukocytes, UA Negative     Nitrite, UA Negative     Protein, UA Negative mg/dl      Glucose, UA Negative mg/dl      Ketones, UA Negative mg/dl      Urobilinogen, UA 0 2 E U /dl      Bilirubin, UA Interference- unable to analyze (A)     Blood, UA Negative     Specific Gravity, UA 1 025    Narrative:       CLINITEK RESULT                 XR chest 2 views   ED Interpretation by Devendra Briggs MD (05/18 1707)   Primary reviewed and no acute abnormality                 Procedures  Procedures       Phone Contacts  ED Phone Contact    ED Course  ED Course as of May 18 1715   Fri May 18, 2018   [address] Work up reviewed and benign  Symtpoms improved  Pt likely suffering from viral sydnrome  Will reassure, , tx symptoms, pcp f/u                                MDM  Number of Diagnoses or Management Options  Diagnosis management comments: Multiple complaints most consistent with a viral syndrome less likely pneumonia, urinary tract infection  Presentation is inconsistent with abscess, and osteomyelitis/diskitis/epidural/spinal abscess, meningitis  Will do chest x-ray, urine dip, IV fluids, symptomatic treatment, reassess    CritCare Time    Disposition  Final diagnoses:   Bronchitis   Dehydration     Time reflects when diagnosis was documented in both MDM as applicable and the Disposition within this note     Time User Action Codes Description Comment    5/18/2018  5:08 PM Kierra Carlos [J40] Bronchitis     5/18/2018  5:08 PM Kierra Carlos [E86 [id number]] Dehydration       ED Disposition     ED Disposition Condition Comment    Discharge  Real Slaughter discharge to home/self care      Condition at discharge: Good        Follow-up Information     Follow up With Specialties Details Why Contact Info    Helen Mo DO Family Medicine Schedule an appointment as soon as possible for a visit in 2 days  [facility]  [phone]            Patient's Medications   Discharge Prescriptions    BENZONATATE (TESSALON PERLES) 100 MG CAPSULE    Take 1 capsule (100 mg total) by mouth 3 (three) times a day as needed for cough for up to 7 days       Start Date: 5/18/2018 End Date: 5/25/2018       Order Dose: 100 mg Quantity: 21 capsule    Refills: 0    GUAIFENESIN (MUCINEX) 600 MG 12 HR TABLET    Take 1 tablet (600 mg total) by mouth 2 (two) times a day for 5 days       Start Date: 5/18/2018 End Date: 5/23/2018       Order Dose: 600 mg       Quantity: 10 tablet    Refills: 0    NAPROXEN (NAPROSYN) 250 MG TABLET    Take 1 tablet (250 mg total) by mouth 3 (three) times a day with meals for 7 days       Start Date: 5/18/2018 End Date: 5/25/2018       Order Dose: 250 mg       Quantity: 21 tablet    Refills: 0     No discharge procedures on file      ED Provider  Electronically Signed by           Marium Steven MD  05/18/18 3357

## 2018-06-18 ENCOUNTER — HOSPITAL ENCOUNTER (OUTPATIENT)
Dept: NON INVASIVE DIAGNOSTICS | [place] CLINIC | Age: 53
Discharge: HOME | End: 2018-06-18
Payer: COMMERCIAL

## 2018-06-18 DIAGNOSIS — R07.9 CHEST PAIN, UNSPECIFIED TYPE: ICD-10-CM

## 2018-06-18 DIAGNOSIS — I44.0 FIRST DEGREE AV BLOCK: ICD-10-CM

## 2018-06-18 DIAGNOSIS — R00.2 PALPITATIONS: ICD-10-CM

## 2018-06-18 PROCEDURE — 93306 TTE W/DOPPLER COMPLETE: CPT | Performed by: INTERNAL MEDICINE

## 2018-06-18 PROCEDURE — 93306 TTE W/DOPPLER COMPLETE: CPT

## 2018-06-18 PROCEDURE — 93225 XTRNL ECG REC<48 HRS REC: CPT

## 2018-06-18 PROCEDURE — 93226 XTRNL ECG REC<48 HR SCAN A/R: CPT

## 2018-06-19 ENCOUNTER — HOSPITAL ENCOUNTER (EMERGENCY)
[place] HOSPITAL | Age: 53
Discharge: HOME | End: 2018-06-19
Admitting: EMERGENCY MEDICINE
Payer: COMMERCIAL

## 2018-06-19 VITALS
OXYGEN SATURATION: 100 % | TEMPERATURE: 98.9 F | SYSTOLIC BLOOD PRESSURE: 134 MMHG | BODY MASS INDEX: 31.23 KG/M2 | RESPIRATION RATE: 18 BRPM | HEART RATE: 89 BPM | DIASTOLIC BLOOD PRESSURE: 81 MMHG | WEIGHT: 187.7 LBS

## 2018-06-19 DIAGNOSIS — L55.0 SUNBURN OF FIRST DEGREE: Primary | ICD-10-CM

## 2018-06-19 PROCEDURE — 99282 EMERGENCY DEPT VISIT SF MDM: CPT

## 2018-06-19 NOTE — DISCHARGE INSTRUCTIONS
Sunburn   WHAT YOU NEED TO KNOW:   What is a sunburn? A sunburn is when your skin is damaged by exposure to ultraviolet (UV) radiation  UV radiation comes from sunlight and devices such as tanning beds  What increases my risk for sunburn? · Certain medicines may make you more sensitive to sunlight  Talk to your healthcare provider to learn more about medicines that may increase your risk for sunburn  · Exposure to UV rays for long periods increases your risk  The longer your skin is under UV rays, the higher your risk for sunburn  · Skin tone that is very light or pale increases your risk for sunburn  · The time of day can increase your risk  Between 10 AM and 3 PM, the sun is hotter and puts out more UV radiation  · Skin that is not protected burns more easily  Your risk increases if you do not protect your skin with sunscreen or clothing  What are the signs and symptoms of a sunburn? Your signs and symptoms may appear while you are under the UV rays  They may also appear a few hours after your exposure  Your symptoms may become worse 12 to 24 hours later  You may have any of the following:  · Red skin    · Pain or a burning feeling    · Swelling, and a feeling of tightness    · Blisters    · Itchiness    · Peeling and flaking  How is a sunburn diagnosed? Your healthcare provider will ask about your signs and symptoms and examine you  He may ask how often and how long you stay under the sun or inside tanning beds  He may also ask if you wear sunscreen or clothing to protect your skin  He may ask if anyone in your family sunburns easily or if anyone has a history of skin cancer  Tell your healthcare provider if you are taking any medicines, or have any other health conditions  How is a sunburn treated? You will need to stay out of the sun and tanning beds  Treatment may decrease symptoms:  · Apply a cool compress  A cool compress or wet towel can help soothe your skin       · Take short baths or showers  Bathe or shower in lukewarm water  Add oatmeal, baking soda, or cornstarch to the bath water to help reduce skin irritation  · Use lotions or gels to keep your skin moist   These include products such as aloe vera, petroleum jelly, or ointments  These may help cool your skin and decrease pain and redness  Ask which products are best for you  · Drink liquids as directed  This will help prevent dehydration  Ask which liquids are best for you and how much liquid to drink each day  · Medicines:      ¨ Acetaminophen  is used to decrease pain  Too much acetaminophen can damage your liver  Read labels so that you know the active ingredients in each medicine that you take  Talk to your healthcare provider before you take more than one medicine that contains acetaminophen  Ask before you take over-the-counter medicine if you are also taking pain medicine ordered for you  ¨ NSAIDs , such as ibuprofen, help decrease swelling, pain, and fever  This medicine is available with or without a doctor's order  NSAIDs can cause stomach bleeding or kidney problems in certain people  If you take blood thinner medicine, always ask your healthcare provider if NSAIDs are safe for you  Always read the medicine label and follow directions  ¨ Steroids  decrease redness, pain, and swelling  This medicine may be given as a pill, or used as a lotion to rub on sunburned areas  How can I prevent a sunburn? · Wear sunscreen with an SPF of 15 or higher  Put sunscreen on 15 to 30 minutes before you go outside, and again every 2 hours  You will need to put sunscreen on again after you swim, sweat, or dry yourself with a towel  · Wear clothing that will block UV rays  This includes dark, loose clothing made of a tight weave fabric  Pants, long-sleeved shirts, wide-brimmed hats, and sunglasses also help block UV rays             · Stay indoors between 10 AM and 3 PM   This will help you avoid the highest concentrations of UV rays  · Limit exposure  Do not stay outdoors or in tanning beds for long periods  · Ask about vitamin supplements  Vitamins A, C, and E may help protect your skin against UV radiation  When should I seek immediate care? · Your skin has many blisters, which break or bleed  · You feel dizzy, weak, or faint  · You have new headaches that do not go away with medicine  · You have problems thinking or remembering things  When should I contact my healthcare provider? · You have a fever  · Your skin is red and itchy from the sunscreen  · You have a new mole, or one that has changed color, shape, or size  · Your skin and mouth are dry, and you feel very thirsty  · You have questions or concerns about your condition or care  CARE AGREEMENT:   You have the right to help plan your care  Learn about your health condition and how it may be treated  Discuss treatment options with your caregivers to decide what care you want to receive  You always have the right to refuse treatment  The above information is an  only  It is not intended as medical advice for individual conditions or treatments  Talk to your doctor, nurse or pharmacist before following any medical regimen to see if it is safe and effective for you  © 2017 [facility] [facility] Information is for End User's use only and may not be sold, redistributed or otherwise used for commercial purposes  All illustrations and images included in CareNotes® are the copyrighted property of [employer]  or [employer] Val[employer]uela

## 2018-06-19 NOTE — ED PROVIDER NOTES
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 and states that she may have overdone it  Patient states that yesterday and today she was at work and started noticing some facial swelling  Patient reports that the employees at her office were concerned due to the swelling of her face especially around her eyes  Patient reports that she denies any change in vision or blurred vision  States that is painful to touch  Also notes that she has sunburn over her forearms, chest   She denies difficulty breathing, chest pain, perioral swelling  She has not done anything for this  Prior to Admission Medications   Prescriptions Last Dose Informant Patient Reported? Taking?   naproxen (NAPROSYN) 250 mg tablet   No No   Sig: Take 1 tablet (250 mg total) by mouth 3 (three) times a day with meals for 7 days   naproxen (NAPROSYN) 500 mg tablet   No No   Sig: Take 1 tablet (500 mg total) by mouth 2 (two) times a day with meals for 5 days      Facility-Administered Medications: None       Past Medical History:   Diagnosis Date    Knee injury     right knee       Past Surgical History:   Procedure Laterality Date    LAPAROSCOPY      Exploratory       Family History   Problem Relation Age of Onset    Diabetes Mother     Hypertension Mother     Heart disease Mother      I have reviewed and agree with the history as documented  Social History   Substance Use Topics    Smoking status: Former Smoker    Smokeless tobacco: Never Used    Alcohol use No        Review of Systems   Constitutional: Negative for chills and fever     HENT: Positive for facial swelling  Negative for congestion  Respiratory: Negative for cough, chest tightness and shortness of breath  Gastrointestinal: Negative for nausea and vomiting  Musculoskeletal: Negative for joint swelling and myalgias  Skin: Positive for color change  Physical Exam  Physical Exam   Constitutional: She is oriented to person, place, and time  She appears well-developed and well-nourished  No distress  HENT:   Head: Normocephalic and atraumatic  Peeling skin noted over periorbital region of right eye  B/l, mild swelling noted  Cardiovascular: Normal rate  Pulmonary/Chest: Effort normal and breath sounds normal    Musculoskeletal: Normal range of motion  Neurological: She is alert and oriented to person, place, and time  Skin: Skin is warm  She is not diaphoretic  There is erythema  Blanchable red sunburn noted over forearms, chest, neck and back  Vitals reviewed  Vital Signs  ED Triage Vitals [06/19/18 1428]   Temperature Pulse Respirations Blood Pressure SpO2   98 9 °F (37 2 °C) 89 18 134/81 100 %      Temp Source Heart Rate Source Patient Position - Orthostatic VS BP Location FiO2 (%)   Temporal Monitor Sitting Right arm --      Pain Score       Worst Possible Pain           Vitals:    06/19/18 1428   BP: 134/81   Pulse: 89   Patient Position - Orthostatic VS: Sitting       Visual Acuity      ED Medications  Medications - No data to display    Diagnostic Studies  Results Reviewed     None                 No orders to display              Procedures  Procedures       Phone Contacts  ED Phone Contact    ED Course                               MDM  Number of Diagnoses or Management Options  Diagnosis management comments: 14-year-old female presents for evaluation some burn over her arms, chest as well as her face  Patient is well-appearing, conversing normally in room    Will advise patient to apply cool compresses over the area as well as aloe finesse Bet[place]ourt Time    Disposition  Final diagnoses:   Sunburn of first degree     Time reflects when diagnosis was documented in both MDM as applicable and the Disposition within this note     Time User Action Codes Description Comment    6/19/2018  4:01 PM Alisson Gomez Add [L55 0] Sunburn of first degree       ED Disposition     ED Disposition Condition Comment    Discharge  Clarisa Walker discharge to home/self care  Condition at discharge: Good        Follow-up Information     Follow up With Specialties Details Why Contact Info    Jamesetta Denver, DO Family Medicine Schedule an appointment as soon as possible for a visit in 3 days Follow up if symptoms persist   [address] [facility]  [address] [facility]  [phone]            Discharge Medication List as of 6/19/2018  4:02 PM      CONTINUE these medications which have NOT CHANGED    Details   naproxen (NAPROSYN) 250 mg tablet Take 1 tablet (250 mg total) by mouth 3 (three) times a day with meals for 7 days, Starting Fri 5/18/2018, Until Fri 5/25/2018, Print      naproxen (NAPROSYN) 500 mg tablet Take 1 tablet (500 mg total) by mouth 2 (two) times a day with meals for 5 days, Starting Fri 4/13/2018, Until Wed 4/18/2018, Print           No discharge procedures on file      ED Provider  Electronically Signed by           Tanika Arcos PA-C  06/19/18 9586

## 2018-06-20 PROCEDURE — 93227 XTRNL ECG REC<48 HR R&I: CPT | Performed by: INTERNAL MEDICINE

## 2018-06-21 ENCOUNTER — TELEPHONE (OUTPATIENT)
Dept: CARDIOLOGY CLINIC | [place] CLINIC | Age: 53
End: 2018-06-21

## 2018-06-21 NOTE — TELEPHONE ENCOUNTER
----- Message from Isabel Pelletier MD sent at 6/20/2018  2:09 PM EDT -----  Please let patient know Holter monitor and echocardiogram were normal

## 2018-08-27 ENCOUNTER — HOSPITAL ENCOUNTER (EMERGENCY)
[place] HOSPITAL | Age: 53
Discharge: HOME | End: 2018-08-27
Attending: EMERGENCY MEDICINE | Admitting: EMERGENCY MEDICINE
Payer: COMMERCIAL

## 2018-08-27 ENCOUNTER — OFFICE VISIT (OUTPATIENT)
Dept: URGENT CARE | Age: 53
End: 2018-08-27
Payer: COMMERCIAL

## 2018-08-27 ENCOUNTER — APPOINTMENT (EMERGENCY)
Dept: CT IMAGING | [place] HOSPITAL | Age: 53
End: 2018-08-27
Payer: COMMERCIAL

## 2018-08-27 VITALS
TEMPERATURE: 97.8 F | SYSTOLIC BLOOD PRESSURE: 147 MMHG | OXYGEN SATURATION: 100 % | DIASTOLIC BLOOD PRESSURE: 85 MMHG | RESPIRATION RATE: 18 BRPM | HEART RATE: 82 BPM

## 2018-08-27 VITALS
TEMPERATURE: 98.4 F | OXYGEN SATURATION: 100 % | SYSTOLIC BLOOD PRESSURE: 161 MMHG | BODY MASS INDEX: 30.99 KG/M2 | WEIGHT: 186 LBS | DIASTOLIC BLOOD PRESSURE: 98 MMHG | HEART RATE: 79 BPM | HEIGHT: 65 IN

## 2018-08-27 DIAGNOSIS — T74.91XA DOMESTIC VIOLENCE OF ADULT, INITIAL ENCOUNTER: ICD-10-CM

## 2018-08-27 DIAGNOSIS — S09.90XA INJURY OF HEAD, INITIAL ENCOUNTER: Primary | ICD-10-CM

## 2018-08-27 DIAGNOSIS — S06.0X9A CONCUSSION: Primary | ICD-10-CM

## 2018-08-27 PROCEDURE — 99284 EMERGENCY DEPT VISIT MOD MDM: CPT

## 2018-08-27 PROCEDURE — 70450 CT HEAD/BRAIN W/O DYE: CPT

## 2018-08-27 PROCEDURE — G0382 LEV 3 HOSP TYPE B ED VISIT: HCPCS | Performed by: FAMILY MEDICINE

## 2018-08-27 RX ORDER — ACETAMINOPHEN 500 MG
1000 TABLET ORAL 2 TIMES DAILY PRN
COMMUNITY
End: 2019-01-29

## 2018-08-27 RX ORDER — RANITIDINE 150 MG/1
150 TABLET ORAL DAILY
COMMUNITY
End: 2019-01-29

## 2018-08-27 NOTE — DISCHARGE INSTRUCTIONS
Concussion   WHAT YOU NEED TO KNOW:   A concussion is a mild brain injury  It is usually caused by a bump or blow to the head from a fall, a motor vehicle crash, or a sports injury  Sometimes being shaken forcefully may cause a concussion  DISCHARGE INSTRUCTIONS:   Have someone else call 911 for the following:   · Someone tries to wake you and cannot do so  · You have a seizure, increasing confusion, or a change in personality  · Your speech becomes slurred, or you have new vision problems  Seek care immediately if:   · You have a severe headache that does not go away  · You have arm or leg weakness, numbness, or new problems with coordination  · You have blood or clear fluid coming out of the ears or nose  Contact your healthcare provider if:   · You have nausea or are vomiting  · You feel more sleepy than usual     · Your symptoms get worse  · Your symptoms last longer than 6 weeks after the injury  · You have questions or concerns about your condition or care  Medicines:   · Acetaminophen  helps to decrease pain  It is available without a doctor's order  Ask how much to take and how often to take it  Follow directions  Acetaminophen can cause liver damage if not taken correctly  · NSAIDs , such as ibuprofen, help decrease swelling and pain  NSAIDs can cause stomach bleeding or kidney problems in certain people  If you take blood thinner medicine, always ask your healthcare provider if NSAIDs are safe for you  Always read the medicine label and follow directions  · Take your medicine as directed  Contact your healthcare provider if you think your medicine is not helping or if you have side effects  Tell him or her if you are allergic to any medicine  Keep a list of the medicines, vitamins, and herbs you take  Include the amounts, and when and why you take them  Bring the list or the pill bottles to follow-up visits  Carry your medicine list with you in case of an emergency    Follow up with your healthcare provider as directed:  Write down your questions so you remember to ask them during your visits  Self-care:   · Rest  from physical and mental activities as directed  Mental activities are those that require thinking, concentration, and attention  You will need to rest until your symptoms are gone  Rest will allow you to recover from your concussion  Ask your healthcare provider when you can return to work and other daily activities  · Have someone stay with you for the first 24 hours after your injury  Your healthcare provider should be contacted if your symptoms get worse, or you develop new symptoms  · Do not participate in sports and physical activities until your healthcare provider says it is okay  They could make your symptoms worse or lead to another concussion  Your healthcare provider will tell you when it is okay for you to return to sports or physical activities  Prevent another concussion:   · Wear protective sports equipment that fit properly  Helmets help decrease your risk of a serious brain injury  Talk to your healthcare provider about ways that can decrease your risk for a concussion if you play sports  · Wear your seat belt  every time you travel  This helps to decrease your risk of a head injury if you are in a car accident  © 2017 [facility] Information is for End User's use only and may not be sold, redistributed or otherwise used for commercial purposes  All illustrations and images included in CareNotes® are the copyrighted property of [employer]  or Morgan Valenzuela  The above information is an  only  It is not intended as medical advice for individual conditions or treatments  Talk to your doctor, nurse or pharmacist before following any medical regimen to see if it is safe and effective for you

## 2018-08-27 NOTE — PATIENT INSTRUCTIONS
Proceed to emergency room for further evaluation  Patient does not want to go by ambulance but states that she is fine to go by private vehicle  Provider concurs  Patient will be going to [address] [place] [address]  Directions were given to patient
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
## 2018-08-30 ENCOUNTER — OFFICE VISIT (OUTPATIENT)
Dept: FAMILY MEDICINE CLINIC | [place] CLINIC | Age: 53
End: 2018-08-30
Payer: COMMERCIAL

## 2018-08-30 VITALS
RESPIRATION RATE: 17 BRPM | OXYGEN SATURATION: 96 % | TEMPERATURE: 99.6 F | SYSTOLIC BLOOD PRESSURE: 144 MMHG | HEIGHT: 65 IN | BODY MASS INDEX: 29.2 KG/M2 | DIASTOLIC BLOOD PRESSURE: 88 MMHG | HEART RATE: 88 BPM | WEIGHT: 175.3 LBS

## 2018-08-30 DIAGNOSIS — S06.0X0D CONCUSSION WITH NO LOSS OF CONSCIOUSNESS, SUBSEQUENT ENCOUNTER: Primary | ICD-10-CM

## 2018-08-30 DIAGNOSIS — T74.91XD DOMESTIC ABUSE OF ADULT, SUBSEQUENT ENCOUNTER: ICD-10-CM

## 2018-08-30 PROBLEM — B35.3 TINEA PEDIS: Status: ACTIVE | Noted: 2017-04-27

## 2018-08-30 PROBLEM — K21.9 GASTROESOPHAGEAL REFLUX DISEASE WITHOUT ESOPHAGITIS: Status: ACTIVE | Noted: 2018-01-29

## 2018-08-30 PROBLEM — L72.3 SEBACEOUS CYST: Status: ACTIVE | Noted: 2017-01-01

## 2018-08-30 PROBLEM — N95.2 VAGINAL ATROPHY: Status: ACTIVE | Noted: 2018-01-29

## 2018-08-30 PROBLEM — K59.00 CONSTIPATION: Status: ACTIVE | Noted: 2018-01-29

## 2018-08-30 PROBLEM — B35.1 ONYCHOMYCOSIS: Status: ACTIVE | Noted: 2017-04-27

## 2018-08-30 PROCEDURE — 99214 OFFICE O/P EST MOD 30 MIN: CPT | Performed by: PHYSICIAN ASSISTANT

## 2018-08-30 RX ORDER — TRAMADOL HYDROCHLORIDE 50 MG/1
50 TABLET ORAL 2 TIMES DAILY PRN
Qty: 15 TABLET | Refills: 0 | Status: SHIPPED | OUTPATIENT
Start: 2018-08-30 | End: 2018-10-31

## 2018-08-30 NOTE — LETTER
August 30, 2018     Patient: Anne Dukes   YOB: 1965   Date of Visit: 8/30/2018       To Whom it May Concern:    Anne Dukes is under my professional care  She was seen in my office on 8/30/2018  She may return to work on 9/5/2018  She is to be excused from work for dates of 8/27/2018-9/4/2018  She will be reevaluated in the office on 9/4/2018 to determine if any change needs to be made regarding her RTW status  If you have any questions or concerns, please don't hesitate to call           Sincerely,          Estefany Randle PA-C        CC: No Recipients
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
## 2018-09-04 ENCOUNTER — OFFICE VISIT (OUTPATIENT)
Dept: FAMILY MEDICINE CLINIC | [place] CLINIC | Age: 53
End: 2018-09-04
Payer: COMMERCIAL

## 2018-09-04 VITALS
TEMPERATURE: 97.8 F | DIASTOLIC BLOOD PRESSURE: 80 MMHG | OXYGEN SATURATION: 97 % | WEIGHT: 180.06 LBS | HEIGHT: 65 IN | SYSTOLIC BLOOD PRESSURE: 116 MMHG | BODY MASS INDEX: 30 KG/M2 | RESPIRATION RATE: 17 BRPM | HEART RATE: 83 BPM

## 2018-09-04 DIAGNOSIS — T74.91XD DOMESTIC ABUSE OF ADULT, SUBSEQUENT ENCOUNTER: ICD-10-CM

## 2018-09-04 DIAGNOSIS — S06.0X0D CONCUSSION WITH NO LOSS OF CONSCIOUSNESS, SUBSEQUENT ENCOUNTER: Primary | ICD-10-CM

## 2018-09-04 DIAGNOSIS — K22.81 ESOPHAGEAL POLYP: ICD-10-CM

## 2018-09-04 DIAGNOSIS — R13.10 DYSPHAGIA, UNSPECIFIED TYPE: ICD-10-CM

## 2018-09-04 PROCEDURE — 99214 OFFICE O/P EST MOD 30 MIN: CPT | Performed by: PHYSICIAN ASSISTANT

## 2018-09-04 NOTE — LETTER
September 4, 2018     Patient: Verna Gutierrez   YOB: 1965   Date of Visit: 9/4/2018       To Whom it May Concern:    Verna Gutierrez is under my professional care  She was seen in my office on 9/4/2018  She may return to work on 9/12/2018  This ties back to previous visit on 8/30/2018  The pt was reassessed and it has been determined that she is not yet cleared to RTW full duty without restrictions given her present condition and symptoms  She is to be excused from work from 8/27/2018-9/11/2018  She will be reassessed on 9/11/2018 at which time it will be determined whether a change can be made to her RTW status  If you have any questions or concerns, please don't hesitate to call           Sincerely,          Lydia Jordan PA-C        CC: No Recipients
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
## 2018-09-11 ENCOUNTER — OFFICE VISIT (OUTPATIENT)
Dept: FAMILY MEDICINE CLINIC | [place] CLINIC | Age: 53
End: 2018-09-11
Payer: COMMERCIAL

## 2018-09-11 VITALS
SYSTOLIC BLOOD PRESSURE: 146 MMHG | TEMPERATURE: 97.9 F | BODY MASS INDEX: 30.31 KG/M2 | WEIGHT: 181.9 LBS | DIASTOLIC BLOOD PRESSURE: 80 MMHG | HEART RATE: 78 BPM

## 2018-09-11 DIAGNOSIS — T74.91XD DOMESTIC ABUSE OF ADULT, SUBSEQUENT ENCOUNTER: ICD-10-CM

## 2018-09-11 DIAGNOSIS — S06.0X0D CONCUSSION WITH NO LOSS OF CONSCIOUSNESS, SUBSEQUENT ENCOUNTER: Primary | ICD-10-CM

## 2018-09-11 DIAGNOSIS — R91.1 SOLITARY LUNG NODULE: ICD-10-CM

## 2018-09-11 PROCEDURE — 99214 OFFICE O/P EST MOD 30 MIN: CPT | Performed by: PHYSICIAN ASSISTANT
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
## 2018-09-12 ENCOUNTER — OFFICE VISIT (OUTPATIENT)
Dept: NEUROLOGY | [place] CLINIC | Age: 53
End: 2018-09-12
Payer: COMMERCIAL

## 2018-09-12 VITALS
HEART RATE: 80 BPM | WEIGHT: 180.5 LBS | SYSTOLIC BLOOD PRESSURE: 116 MMHG | HEIGHT: 64 IN | DIASTOLIC BLOOD PRESSURE: 74 MMHG | BODY MASS INDEX: 30.81 KG/M2

## 2018-09-12 DIAGNOSIS — Z71.89 HEAD INJURY CONSULTATION: ICD-10-CM

## 2018-09-12 DIAGNOSIS — G44.309 POST-CONCUSSION HEADACHE: ICD-10-CM

## 2018-09-12 DIAGNOSIS — T74.91XD DOMESTIC VIOLENCE OF ADULT, SUBSEQUENT ENCOUNTER: Primary | ICD-10-CM

## 2018-09-12 DIAGNOSIS — F32.A DEPRESSION, UNSPECIFIED DEPRESSION TYPE: ICD-10-CM

## 2018-09-12 PROBLEM — T74.91XA ADULT ABUSE, DOMESTIC: Status: ACTIVE | Noted: 2018-09-12

## 2018-09-12 PROCEDURE — 99245 OFF/OP CONSLTJ NEW/EST HI 55: CPT | Performed by: PSYCHIATRY & NEUROLOGY

## 2018-09-12 RX ORDER — BUTALBITAL, ACETAMINOPHEN AND CAFFEINE 50; 325; 40 MG/1; MG/1; MG/1
1 TABLET ORAL EVERY 4 HOURS PRN
Qty: 12 TABLET | Refills: 1 | Status: ON HOLD | OUTPATIENT
Start: 2018-09-12 | End: 2018-12-20

## 2018-09-12 RX ORDER — NORTRIPTYLINE HYDROCHLORIDE 10 MG/1
10 CAPSULE ORAL
Qty: 30 CAPSULE | Refills: 2 | Status: SHIPPED | OUTPATIENT
Start: 2018-09-12 | End: 2018-09-12 | Stop reason: SDUPTHER

## 2018-09-12 RX ORDER — NORTRIPTYLINE HYDROCHLORIDE 10 MG/1
CAPSULE ORAL
Qty: 60 CAPSULE | Refills: 1 | Status: SHIPPED | OUTPATIENT
Start: 2018-09-12 | End: 2018-10-31
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
## 2018-09-13 ENCOUNTER — EVALUATION (OUTPATIENT)
Dept: PHYSICAL THERAPY | [place] CLINIC | Age: 53
End: 2018-09-13
Payer: COMMERCIAL

## 2018-09-13 ENCOUNTER — TELEPHONE (OUTPATIENT)
Dept: BEHAVIORAL/MENTAL HEALTH CLINIC | [place] CLINIC | Age: 53
End: 2018-09-13

## 2018-09-13 ENCOUNTER — EVALUATION (OUTPATIENT)
Dept: SPEECH THERAPY | [place] CLINIC | Age: 53
End: 2018-09-13
Payer: COMMERCIAL

## 2018-09-13 DIAGNOSIS — G44.309 POST-CONCUSSION HEADACHE: ICD-10-CM

## 2018-09-13 DIAGNOSIS — Z71.89 HEAD INJURY CONSULTATION: ICD-10-CM

## 2018-09-13 DIAGNOSIS — R48.8 OTHER SYMBOLIC DYSFUNCTIONS: Primary | ICD-10-CM

## 2018-09-13 DIAGNOSIS — G44.309 POST-CONCUSSION HEADACHE: Primary | ICD-10-CM

## 2018-09-13 PROCEDURE — 96125 COGNITIVE TEST BY HC PRO: CPT | Performed by: SPEECH-LANGUAGE PATHOLOGIST

## 2018-09-13 PROCEDURE — G8979 MOBILITY GOAL STATUS: HCPCS | Performed by: PHYSICAL THERAPIST

## 2018-09-13 PROCEDURE — G9175 SPEECH LANG GOAL STATUS: HCPCS | Performed by: SPEECH-LANGUAGE PATHOLOGIST

## 2018-09-13 PROCEDURE — 97162 PT EVAL MOD COMPLEX 30 MIN: CPT | Performed by: PHYSICAL THERAPIST

## 2018-09-13 PROCEDURE — G9174 SPEECH LANG CURRENT STATUS: HCPCS | Performed by: SPEECH-LANGUAGE PATHOLOGIST

## 2018-09-13 PROCEDURE — G8978 MOBILITY CURRENT STATUS: HCPCS | Performed by: PHYSICAL THERAPIST
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
## 2018-09-14 ENCOUNTER — DOCUMENTATION (OUTPATIENT)
Dept: FAMILY MEDICINE CLINIC | [place] CLINIC | Age: 53
End: 2018-09-14

## 2018-09-14 ENCOUNTER — EVALUATION (OUTPATIENT)
Dept: OCCUPATIONAL THERAPY | [place] CLINIC | Age: 53
End: 2018-09-14
Payer: COMMERCIAL

## 2018-09-14 DIAGNOSIS — G44.309 POST-CONCUSSION HEADACHE: ICD-10-CM

## 2018-09-14 DIAGNOSIS — Z71.89 HEAD INJURY CONSULTATION: Primary | ICD-10-CM

## 2018-09-14 PROCEDURE — G8991 OTHER PT/OT GOAL STATUS: HCPCS

## 2018-09-14 PROCEDURE — 97166 OT EVAL MOD COMPLEX 45 MIN: CPT

## 2018-09-14 PROCEDURE — G8990 OTHER PT/OT CURRENT STATUS: HCPCS

## 2018-09-19 ENCOUNTER — HOSPITAL ENCOUNTER (OUTPATIENT)
Dept: RADIOLOGY | Age: 53
Discharge: HOME | End: 2018-09-19
Payer: COMMERCIAL

## 2018-09-19 DIAGNOSIS — R91.1 SOLITARY LUNG NODULE: ICD-10-CM

## 2018-09-19 PROCEDURE — 71250 CT THORAX DX C-: CPT

## 2018-09-20 ENCOUNTER — OFFICE VISIT (OUTPATIENT)
Dept: PHYSICAL THERAPY | [place] CLINIC | Age: 53
End: 2018-09-20
Payer: COMMERCIAL

## 2018-09-20 ENCOUNTER — OFFICE VISIT (OUTPATIENT)
Dept: SPEECH THERAPY | [place] CLINIC | Age: 53
End: 2018-09-20
Payer: COMMERCIAL

## 2018-09-20 DIAGNOSIS — R48.8 OTHER SYMBOLIC DYSFUNCTIONS: Primary | ICD-10-CM

## 2018-09-20 DIAGNOSIS — Z71.89 HEAD INJURY CONSULTATION: ICD-10-CM

## 2018-09-20 DIAGNOSIS — G44.309 POST-CONCUSSION HEADACHE: ICD-10-CM

## 2018-09-20 DIAGNOSIS — G44.309 POST-CONCUSSION HEADACHE: Primary | ICD-10-CM

## 2018-09-20 PROCEDURE — 97014 ELECTRIC STIMULATION THERAPY: CPT

## 2018-09-20 PROCEDURE — 97112 NEUROMUSCULAR REEDUCATION: CPT

## 2018-09-20 PROCEDURE — 97140 MANUAL THERAPY 1/> REGIONS: CPT

## 2018-09-20 PROCEDURE — 92507 TX SP LANG VOICE COMM INDIV: CPT

## 2018-09-20 NOTE — PROGRESS NOTES
Daily Note     Today's date: 2018  Patient name: Verna Gutierrez  : 1965  MRN: [id number]  Referring provider: Erickson Mendoza PA-C  Dx:   Encounter Diagnosis     ICD-10-CM    1  Post-concussion headache G44 309    2  Head injury consultation Z71 89          Subjective: Patient reports 10/10 headache upon arrival  Denies dizziness  Objective: See treatment diary below  Precautions: n/a    Daily Treatment Diary     Manual         STM 10 min                                           Exercise Diary         UT stretch 30''x3       LS stretch 30''x3       Chin tucks 5'', 15x       TB extension OTB, 2x10       TB rows OTB, 2x10       TB ER OTB, 2x10       saccades Standing plain, 30''x2                                                                                                                   Modalities        MHP/TENS 10 min                         Assessment: Therapist explained pain scale, and patient still reported 10/10 headache, but demonstrates no observable signs of pain  MHP/TENS to start session  Patient reported reduced headache to 4-5/10 pain after modalities  Noted tightness and TTP with STM, reduced headache to 3/10 after manuals  Cueing for proper form with self neck stretches  Requires cueing to avoid UT compensation with TB exercises  Reports being a little "woozy" with saccades  Headache post tx 6-7/10 after session, mostly exacerbated by saccades  Plan: Progress treatment as tolerated
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
## 2018-09-21 NOTE — PROGRESS NOTES
Daily Speech Treatment Note    Today's date: 2018   Patients name: Yasmeen Velarde  : 1965  MRN: [id number]  Safety measures: head injury, fall risk   Referring provider: Felicity Age, *    Primary Diagnosis/Billing code: R48 8  Secondary Diagnosis/ Billing code: Z71 89, N32 667     Visit Tracking:  -Referring provider: Epic  -Billing guidelines: AMA  -Visit #3    -[employer]  -RE due 10/25/2018  Subjective/Behavioral:  -"The homework was hard " Patient reported HA upon arrival to [address] [place] [address]  following     Patient reported that she noticed a "throat tightness" when taking her medication with water  Clinician completed a dysphagia screening with thin liquid water  Good oral control of liquid bolus  AP transfer and swallow intitiation appeared WNL  HLE appeared WNL upon palpation  No overt s/sx of penetration and/or aspiration  Patient had mild c/o "throat tightness"  Clinician educated patient to f/u with neurologist, who prescribed new sleeping medication, as patient described this "throat tightness" to be new in onset--patient recently started taking this medication as well  Patient was also presented with education on reflux precautions (dx with GERD), including lifestyle changes, specific food avoidance, upright positioning after meals/snacks/medications, etc  Supplemental handout presented to patient as well for carryover  Patient is f/u with GI in October    Objective/Assessment:  -Reviewed patient's home exercises/activities completed since last appointment  Per patient report, she had great difficulty  Short-term goals:  1  Patient will be educated on word finding strategies (i e , circumlocution) for improved generative naming and verbal expression skills (to be achieved in 1-2 weeks)  2  Patient will name an average of 15+ items in a category in 60 seconds over 5 trials using compensatory strategies to facilitate improved word retrieval skills (to be achieved in 4-6 weeks)      3   Patient will name an average of 10+ words that begin with a specific letter in 60 seconds over 5 trials using compensatory strategies to facilitate improved word retrieval skills (to be achieved in 4-6 weeks)      4  Patient will complete word generation tasks (e g , synonyms/antonyms, analogies, category matrices, etc ) with 80% accuracy using word finding strategies to facilitate improved word retrieval skills (to be achieved in 4-6 weeks)  -Word finding: Patient presented with a list of words and asked to generate 3 synonyms for each word  Task completed in 38/66 opp (58%) independently, increasing to 54/66 opp (82% acc) with min-mod semantic cues, increasing to 100% acc with additional min phonemic cues  5  Patient will complete auditory immediate and short term memory tasks to 80% accuracy to facilitate increased ability to retell narratives and recall information within functional living environment (to be achieved in 4-6 weeks)  6  Patient will complete thought organization tasks (e g , sequencing, deduction puzzles, etc ) with 80% accuracy to facilitate increased executive functioning skills (to be achieved in 4-6 weeks)  Plan:  -Patient was provided with home exercises/activities to target goals in plan of care at the end of today's session   -Continue with current plan of care

## 2018-09-24 ENCOUNTER — TELEPHONE (OUTPATIENT)
Dept: FAMILY MEDICINE CLINIC | [place] CLINIC | Age: 53
End: 2018-09-24

## 2018-09-24 ENCOUNTER — OFFICE VISIT (OUTPATIENT)
Dept: PHYSICAL THERAPY | [place] CLINIC | Age: 53
End: 2018-09-24
Payer: COMMERCIAL

## 2018-09-24 ENCOUNTER — OFFICE VISIT (OUTPATIENT)
Dept: SPEECH THERAPY | [place] CLINIC | Age: 53
End: 2018-09-24
Payer: COMMERCIAL

## 2018-09-24 ENCOUNTER — OFFICE VISIT (OUTPATIENT)
Dept: OCCUPATIONAL THERAPY | [place] CLINIC | Age: 53
End: 2018-09-24
Payer: COMMERCIAL

## 2018-09-24 DIAGNOSIS — Z71.89 HEAD INJURY CONSULTATION: ICD-10-CM

## 2018-09-24 DIAGNOSIS — G44.309 POST-CONCUSSION HEADACHE: Primary | ICD-10-CM

## 2018-09-24 DIAGNOSIS — G44.309 POST-CONCUSSION HEADACHE: ICD-10-CM

## 2018-09-24 DIAGNOSIS — R48.8 OTHER SYMBOLIC DYSFUNCTIONS: Primary | ICD-10-CM

## 2018-09-24 PROCEDURE — 97112 NEUROMUSCULAR REEDUCATION: CPT

## 2018-09-24 PROCEDURE — 97014 ELECTRIC STIMULATION THERAPY: CPT

## 2018-09-24 PROCEDURE — 97535 SELF CARE MNGMENT TRAINING: CPT

## 2018-09-24 PROCEDURE — 97140 MANUAL THERAPY 1/> REGIONS: CPT

## 2018-09-24 PROCEDURE — 92507 TX SP LANG VOICE COMM INDIV: CPT | Performed by: SPEECH-LANGUAGE PATHOLOGIST

## 2018-09-24 NOTE — TELEPHONE ENCOUNTER
[facility] called to let Pratibha Anne know pt's CT results are in Epic and ready for review with significant findings
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
## 2018-09-26 ENCOUNTER — OFFICE VISIT (OUTPATIENT)
Dept: SPEECH THERAPY | [place] CLINIC | Age: 53
End: 2018-09-26
Payer: COMMERCIAL

## 2018-09-26 ENCOUNTER — OFFICE VISIT (OUTPATIENT)
Dept: PHYSICAL THERAPY | [place] CLINIC | Age: 53
End: 2018-09-26
Payer: COMMERCIAL

## 2018-09-26 ENCOUNTER — OFFICE VISIT (OUTPATIENT)
Dept: OCCUPATIONAL THERAPY | [place] CLINIC | Age: 53
End: 2018-09-26
Payer: COMMERCIAL

## 2018-09-26 DIAGNOSIS — G44.309 POST-CONCUSSION HEADACHE: Primary | ICD-10-CM

## 2018-09-26 DIAGNOSIS — Z71.89 HEAD INJURY CONSULTATION: ICD-10-CM

## 2018-09-26 DIAGNOSIS — G44.309 POST-CONCUSSION HEADACHE: ICD-10-CM

## 2018-09-26 DIAGNOSIS — R48.8 OTHER SYMBOLIC DYSFUNCTIONS: Primary | ICD-10-CM

## 2018-09-26 PROCEDURE — 92507 TX SP LANG VOICE COMM INDIV: CPT

## 2018-09-26 PROCEDURE — 97140 MANUAL THERAPY 1/> REGIONS: CPT | Performed by: PHYSICAL THERAPIST

## 2018-09-26 PROCEDURE — 97014 ELECTRIC STIMULATION THERAPY: CPT | Performed by: PHYSICAL THERAPIST

## 2018-09-26 PROCEDURE — 97530 THERAPEUTIC ACTIVITIES: CPT | Performed by: PHYSICAL THERAPIST

## 2018-09-26 PROCEDURE — 97535 SELF CARE MNGMENT TRAINING: CPT

## 2018-09-26 PROCEDURE — 97112 NEUROMUSCULAR REEDUCATION: CPT | Performed by: PHYSICAL THERAPIST

## 2018-09-26 NOTE — PROGRESS NOTES
Daily Speech Treatment Note    Today's date: 2018   Patients name: Hany Sargent  : 1965  MRN: [id number]  Safety measures: head injury, fall risk   Referring provider: Darian Jang, *    Primary Diagnosis/Billing code: R48 8  Secondary Diagnosis/ Billing code: Z71 89, R27 [id number]     Visit Tracking:  -Referring provider: Epic  -Billing guidelines: AMA  -Visit #4   -[employer]  -RE due 10/25/2018  Subjective/Behavioral:  -"I have a lot of headaches  Right now it's like a 4/5"  Objective/Assessment:  -Reviewed patient's home exercises/activities completed since last appointment  Per patient report, she had great difficulty  Short-term goals:  1  Patient will be educated on word finding strategies (i e , circumlocution) for improved generative naming and verbal expression skills (to be achieved in 1-2 weeks)  2  Patient will name an average of 15+ items in a category in 60 seconds over 5 trials using compensatory strategies to facilitate improved word retrieval skills (to be achieved in 4-6 weeks)    Pt was given a category and asked to name as many items in given category  No time restraints  Trialled 3x  Avg of 12 items  3  Patient will name an average of 10+ words that begin with a specific letter in 60 seconds over 5 trials using compensatory strategies to facilitate improved word retrieval skills (to be achieved in 4-6 weeks)      4  Patient will complete word generation tasks (e g , synonyms/antonyms, analogies, category matrices, etc ) with 80% accuracy using word finding strategies to facilitate improved word retrieval skills (to be achieved in 4-6 weeks)  To target semantic association and lexicon building, patient asked to name an opposite/antonym of an abstract word (i e , miserly)  Task completed in  opp independently, increasing to  opp with mod verbal semantic cues        5  Patient will complete auditory immediate and short term memory tasks to 80% accuracy to facilitate increased ability to retell narratives and recall information within functional living environment (to be achieved in 4-6 weeks)  To target immediate memory, pt used [employer] system  Pt was provided with increasing word list to remember in order  Task completed over 5 minutes, with 95% accuracy and 21 correct repetitions  Good attention to task  6  Patient will complete thought organization tasks (e g , sequencing, deduction puzzles, etc ) with 80% accuracy to facilitate increased executive functioning skills (to be achieved in 4-6 weeks)  Pt was given 10 individual letter chips (from game bananagrams) and asked to rearrange to make as many words as possible in 3 minutes  Trialed 3x  Made an avg of 13 6 words  Pt noted to have decreased attention during 1 trial      Plan:  -Patient was provided with home exercises/activities to target goals in plan of care at the end of today's session   -Continue with current plan of care

## 2018-09-26 NOTE — PROGRESS NOTES
Daily Note     Today's date: 2018  Patient name: Marj Catalan  : 1965  MRN: [id number]  Referring provider: Vinicius Lui PA-C  Dx:   Encounter Diagnosis     ICD-10-CM    1  Post-concussion headache G44 309    2  Head injury consultation [id number]          Subjective: Patient reports not having a good day today      Objective: See treatment diary below  Precautions: n/a    Daily Treatment Diary     Manual       STM 10 min 10 min 10 min                                         Exercise Diary       UT stretch 30''x3 30''x3 30secx 3     LS stretch 30''x3 30''x3 30sec x 3     Chin tucks 5'', 15x 5'', 15x 5" x15     TB extension OTB, 2x10 OTB, 2x10      TB rows OTB, 2x10 OTB, 2x10      TB ER OTB, 2x10 OTB, 2x10      saccades Standing plain, 30''x2 Standing plain, 60''      amb w/ HT/HN  1 lap ea      amb w/ 360* turns  1 lap, D 4/10      3 finger chin tuck rotation   10x ea     Self trp release cane   30 sec x 2                                                                                  Modalities      MHP/TENS 10 min 10 min 10 min                       Assessment: Patient had increase in headache pain after exercises this session but decreased significantly with manual  Therapist instructed in self TrP release which pt demonstrated undrestanding  Plan: Progress treatment as tolerated
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
## 2018-09-27 NOTE — PROGRESS NOTES
Daily Speech Treatment Note    Today's date: 10/1/2018   Patients name: Jaime Goyal  : 1965  MRN: [id number]  Safety measures: head injury, fall risk   Referring provider: Rosa Zavala, *    Primary Diagnosis/Billing code: R48 8  Secondary Diagnosis/ Billing code: Z71 89, [id number]     Visit Tracking:  -Referring provider: Epic  -Billing guidelines: AMA  -Visit #5   -[employer]  -RE due 10/25/2018  Subjective/Behavioral:  -"I just want to get back to the old me "    Patient reported that she had a HA upon arrival to [address] [place] [address] following OT (pain: 7/10)  Objective/Assessment:  -Reviewed patient's home exercises/activities completed since last appointment  Antonym naming completed in   Word deduction grids completed in  opp  Short-term goals:  1  Patient will be educated on word finding strategies (i e , circumlocution) for improved generative naming and verbal expression skills (to be achieved in 1-2 weeks)  2  Patient will name an average of 15+ items in a category in 60 seconds over 5 trials using compensatory strategies to facilitate improved word retrieval skills (to be achieved in 4-6 weeks)  3  Patient will name an average of 10+ words that begin with a specific letter in 60 seconds over 5 trials using compensatory strategies to facilitate improved word retrieval skills (to be achieved in 4-6 weeks)      4  Patient will complete word generation tasks (e g , synonyms/antonyms, analogies, category matrices, etc ) with 80% accuracy using word finding strategies to facilitate improved word retrieval skills (to be achieved in 4-6 weeks)  5  Patient will complete auditory immediate and short term memory tasks to 80% accuracy to facilitate increased ability to retell narratives and recall information within functional living environment (to be achieved in 4-6 weeks)    -Mental manipulation: Clinician read 3-4 words aloud and patient asked to recall words in a sequential order (e g , Uudtuiby-Ocesy-Bqpccc = [id number])  Task completed in 13/18 opp (72%) independently, increasing to 18/18 opp (100% acc) with min verbal repetition cues  Task completed in 13/18 opp (72%) independently, increasing to 16/18 opp (89% acc) with mod verbal repetition cues  6  Patient will complete thought organization tasks (e g , sequencing, deduction puzzles, etc ) with 80% accuracy to facilitate increased executive functioning skills (to be achieved in 4-6 weeks)  -Deductive reasoning/problem solving ([facility] 2, pg  273): Patient was presented with deductive reasoning puzzles and asked to solve  Puzzle #1 (3x4 grid) completed in 5/12 opp (42% acc) independently, increased to 100% acc with min verbal/visual cues from clinician  HA following task (pain: 8/10)--increased c/o visual sensitivities  *End of session: HA 5/10  Plan:  -Patient was provided with home exercises/activities to target goals in plan of care at the end of today's session   -Continue with current plan of care

## 2018-10-01 ENCOUNTER — OFFICE VISIT (OUTPATIENT)
Dept: SPEECH THERAPY | [place] CLINIC | Age: 53
End: 2018-10-01
Payer: COMMERCIAL

## 2018-10-01 ENCOUNTER — OFFICE VISIT (OUTPATIENT)
Dept: OCCUPATIONAL THERAPY | [place] CLINIC | Age: 53
End: 2018-10-01
Payer: COMMERCIAL

## 2018-10-01 ENCOUNTER — OFFICE VISIT (OUTPATIENT)
Dept: PHYSICAL THERAPY | [place] CLINIC | Age: 53
End: 2018-10-01
Payer: COMMERCIAL

## 2018-10-01 DIAGNOSIS — R48.8 OTHER SYMBOLIC DYSFUNCTIONS: Primary | ICD-10-CM

## 2018-10-01 DIAGNOSIS — Z71.89 HEAD INJURY CONSULTATION: ICD-10-CM

## 2018-10-01 DIAGNOSIS — G44.309 POST-CONCUSSION HEADACHE: Primary | ICD-10-CM

## 2018-10-01 DIAGNOSIS — G44.309 POST-CONCUSSION HEADACHE: ICD-10-CM

## 2018-10-01 PROCEDURE — 97530 THERAPEUTIC ACTIVITIES: CPT

## 2018-10-01 PROCEDURE — 97014 ELECTRIC STIMULATION THERAPY: CPT | Performed by: PHYSICAL THERAPIST

## 2018-10-01 PROCEDURE — 97010 HOT OR COLD PACKS THERAPY: CPT | Performed by: PHYSICAL THERAPIST

## 2018-10-01 PROCEDURE — 97112 NEUROMUSCULAR REEDUCATION: CPT

## 2018-10-01 PROCEDURE — 97112 NEUROMUSCULAR REEDUCATION: CPT | Performed by: PHYSICAL THERAPIST

## 2018-10-01 PROCEDURE — 97535 SELF CARE MNGMENT TRAINING: CPT

## 2018-10-01 PROCEDURE — 92507 TX SP LANG VOICE COMM INDIV: CPT | Performed by: SPEECH-LANGUAGE PATHOLOGIST

## 2018-10-01 NOTE — PROGRESS NOTES
Daily Note     Today's date: 10/3/2018  Patient name: Shiloh Guardado  : 1965  MRN: [id number]  Referring provider: Jp Nobles PA-C  Dx:   Encounter Diagnosis     ICD-10-CM    1  Post-concussion headache G44 309    2  Head injury consultation Z71 [id number]          Subjective: Patient reports not having a good day today      Objective: See treatment diary below  Precautions: n/a    Daily Treatment Diary     Manual  9/20 9/24 9/26 10/01    STM 10 min 10 min 10 min NP - resume at [facility]                                        Exercise Diary  9/20 9/24 9/26 10/01/18    UT stretch 30''x3 30''x3 30secx 3 30"x3    LS stretch 30''x3 30''x3 30sec x 3 30"x3    Chin tucks 5'', 15x 5'', 15x 5" x15     TB extension OTB, 2x10 OTB, 2x10  GTB 2x10    TB rows OTB, 2x10 OTB, 2x10  GTB 2x10     TB ER OTB, 2x10 OTB, 2x10  GTB 2x10    saccades Standing plain, 30''x2 Standing plain, 60''  Standing plain    60" ea     amb w/ HT/HN  1 lap ea  80ft ea    amb w/ 360* turns  1 lap, D 4/10  40ft ea    3 finger chin tuck rotation   10x ea 10x ea    Self trp release cane   30 sec x 2  NP    VOR x 1     Standing plain   H 60"  V 60"                                                                        Modalities 9/20 9/24 9/26 10/01/18    MHP/TENS 10 min 10 min 10 min 15 min                        Assessment: Requires cues to attend to tasks and for proper dosage and technique with activities  Reports good reduction of HA and neck pain with modalities, continue performance at NV  Plan: Progress treatment as tolerated
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
## 2018-10-04 ENCOUNTER — OFFICE VISIT (OUTPATIENT)
Dept: PHYSICAL THERAPY | [place] CLINIC | Age: 53
End: 2018-10-04
Payer: COMMERCIAL

## 2018-10-04 ENCOUNTER — OFFICE VISIT (OUTPATIENT)
Dept: OCCUPATIONAL THERAPY | [place] CLINIC | Age: 53
End: 2018-10-04
Payer: COMMERCIAL

## 2018-10-04 ENCOUNTER — OFFICE VISIT (OUTPATIENT)
Dept: SPEECH THERAPY | [place] CLINIC | Age: 53
End: 2018-10-04
Payer: COMMERCIAL

## 2018-10-04 ENCOUNTER — TELEPHONE (OUTPATIENT)
Dept: NEUROLOGY | [place] CLINIC | Age: 53
End: 2018-10-04

## 2018-10-04 DIAGNOSIS — G44.309 POST-CONCUSSION HEADACHE: ICD-10-CM

## 2018-10-04 DIAGNOSIS — Z71.89 HEAD INJURY CONSULTATION: ICD-10-CM

## 2018-10-04 DIAGNOSIS — G44.309 POST-CONCUSSION HEADACHE: Primary | ICD-10-CM

## 2018-10-04 DIAGNOSIS — R48.8 OTHER SYMBOLIC DYSFUNCTIONS: Primary | ICD-10-CM

## 2018-10-04 PROCEDURE — 97535 SELF CARE MNGMENT TRAINING: CPT

## 2018-10-04 PROCEDURE — 97150 GROUP THERAPEUTIC PROCEDURES: CPT

## 2018-10-04 PROCEDURE — 97112 NEUROMUSCULAR REEDUCATION: CPT | Performed by: PHYSICAL THERAPIST

## 2018-10-04 PROCEDURE — 92507 TX SP LANG VOICE COMM INDIV: CPT

## 2018-10-04 PROCEDURE — 97150 GROUP THERAPEUTIC PROCEDURES: CPT | Performed by: PHYSICAL THERAPIST

## 2018-10-04 NOTE — PROGRESS NOTES
Daily Note     Today's date: 10/4/2018  Patient name: Renetta Trimble  : 1965  MRN: [id number]  Referring provider: Barber Humphrey PA-C  Dx:   Encounter Diagnosis     ICD-10-CM    1  Post-concussion headache G44 309    2  Head injury consultation Z71 89          Subjective: Had increased HA after OT 8/10, however once rested between sessions 4/10 upon arrival to PT  No complaints dizziness or nausea upon arrival   With some complaints of dizziness and nausea during session with VOR exercises  GARLAND maintained the same during session  Reported compliance with neck stretches at home  Discussed completing more concussion based exercises today and leaving neck stretches for home        Objective: See treatment diary below  Precautions: n/a    Daily Treatment Diary     Manual  9/20 9/24 9/26 10/01 10/4   STM 10 min 10 min 10 min NP    SOR in supine     x3min   Gentle cervical distraction in supine     x3min                       Exercise Diary  9/20 9/24 9/26 10/01/18 10/4   UT stretch 30''x3 30''x3 30secx 3 30"x3    LS stretch 30''x3 30''x3 30sec x 3 30"x3    Chin tucks 5'', 15x 5'', 15x 5" x15     TB extension OTB, 2x10 OTB, 2x10  GTB 2x10    TB rows OTB, 2x10 OTB, 2x10  GTB 2x10     TB ER OTB, 2x10 OTB, 2x10  GTB 2x10    saccades Standing plain, 30''x2 Standing plain, 60''  Standing plain    60" ea  Standing plain H/V 2x45s ea   amb w/ HT/HN  1 lap ea  80ft ea In rosenberg  2x40' ea   amb w/ 360* turns  1 lap, D /10  40ft ea In rosenberg 2x40'   3 finger chin tuck rotation   10x ea 10x ea    Self trp release cane   30 sec x 2  NP    VOR x 1     Standing plain   H 60"  V 60" Standing plain H/V 2x45s ea   VORcx, H/V standing     In rosenberg  2x40'   Foam FT EC     3x30s   Tandem gait     // bars  3 laps   Ball toss hand to hand     Standing x1min   Bike     NV                               Modalities 9/20 9/24 9/26 10/01/18 10   MHP/TENS 10 min 10 min 10 min 15 min In supine  x10 min                    1:1  [id number] group  1035-11 unsupervised    Assessment: Pt able to add more concussion based exercises today  Limited slightly due to dizziness and nausea with VOR exercises and saccades however able to complete, required slower movements to maintain focus on targets  Some increased sway with FT EC on foam, however with more reps balance improved  Plan: Progress treatment as tolerated  Add recumbent bike for exertion therapy NV as pt able

## 2018-10-04 NOTE — TELEPHONE ENCOUNTER
Lisa Reyna came in this morning to drop off disability paperwork  I notified her that it has a 2 week turn around time  She said she would be back in 2 weeks to pick it up
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
## 2018-10-08 ENCOUNTER — OFFICE VISIT (OUTPATIENT)
Dept: OCCUPATIONAL THERAPY | [place] CLINIC | Age: 53
End: 2018-10-08
Payer: COMMERCIAL

## 2018-10-08 ENCOUNTER — OFFICE VISIT (OUTPATIENT)
Dept: PHYSICAL THERAPY | [place] CLINIC | Age: 53
End: 2018-10-08
Payer: COMMERCIAL

## 2018-10-08 ENCOUNTER — OFFICE VISIT (OUTPATIENT)
Dept: SPEECH THERAPY | [place] CLINIC | Age: 53
End: 2018-10-08
Payer: COMMERCIAL

## 2018-10-08 DIAGNOSIS — Z71.89 HEAD INJURY CONSULTATION: Primary | ICD-10-CM

## 2018-10-08 DIAGNOSIS — Z71.89 HEAD INJURY CONSULTATION: ICD-10-CM

## 2018-10-08 DIAGNOSIS — G44.309 POST-CONCUSSION HEADACHE: ICD-10-CM

## 2018-10-08 DIAGNOSIS — R48.8 OTHER SYMBOLIC DYSFUNCTIONS: Primary | ICD-10-CM

## 2018-10-08 DIAGNOSIS — G44.309 POST-CONCUSSION HEADACHE: Primary | ICD-10-CM

## 2018-10-08 PROCEDURE — 97014 ELECTRIC STIMULATION THERAPY: CPT | Performed by: PHYSICAL THERAPIST

## 2018-10-08 PROCEDURE — 97112 NEUROMUSCULAR REEDUCATION: CPT | Performed by: PHYSICAL THERAPIST

## 2018-10-08 PROCEDURE — 97530 THERAPEUTIC ACTIVITIES: CPT

## 2018-10-08 PROCEDURE — 92507 TX SP LANG VOICE COMM INDIV: CPT

## 2018-10-08 NOTE — PROGRESS NOTES
Daily Note     Today's date: 10/8/2018  Patient name: Jazlyn Tai  : 1965  MRN: [id number]  Referring provider: Kalia Lemus PA-C  Dx:   Encounter Diagnosis     ICD-10-CM    1  Post-concussion headache G44 309    2  Head injury consultation Z71 [id number]          Subjective: Had increased HA after OT 8/10, however once rested between sessions 4/10 upon arrival to PT  No complaints dizziness or nausea upon arrival   With some complaints of dizziness and nausea during session with VOR exercises  GARLAND maintained the same during session  Reported compliance with neck stretches at home  Discussed completing more concussion based exercises today and leaving neck stretches for home  Objective: See treatment diary below  Precautions: n/a    Daily Treatment Diary     Manual  10/8/18    10/4   STM        SOR in supine x3min    x3min   Gentle cervical distraction in supine x3min    x3min                       Exercise Diary  10/08    10/4   UT stretch        LS stretch        Chin tucks        TB extension        TB rows        TB ER        saccades Seated busy  H 30"x2  V 30" x2    Standing plain H/V 2x45s ea   amb w/ HT/HN In rosenberg  2x40' ea    In rosenberg  2x40' ea   amb w/ 360* turns In rosenberg  2x40'    In rosenberg 2x40'   3 finger chin tuck rotation        Self trp release cane        VOR x 1  Seated busy  H 30"x2  V 30" x2    Standing plain H/V 2x45s ea   VORcx, H/V standing Seated busy  H 30"x2  V 30" x2    In rosenberg  2x40'   Foam FT EC 3x30s semitandem   3x30s   Tandem gait In rosenberg  40' EO  40' EC    // bars  3 laps   Ball toss hand to hand Standing on foam  x1min    Standing x1min   Bike Recumbent L1  x2 5min  HA pre 0/10  HR pre 75    NV                               Modalities     10/4   MHP/TENS In supine  x10 min    In supine  x10 min                       Assessment: Encouraged to move head quicker with VOR, notes she moves slower because of her stiff neck   Initiated exertion on bike today due to pt wanting to get back to gym  Only tolerated 2 5 min due to increasing HA  Minimal increase with dizziness/nausea progressing to busy background today  Plan: Progress treatment as tolerated  Complete oculomotor exercises standing NV as pt able

## 2018-10-08 NOTE — PROGRESS NOTES
Daily Speech Treatment Note    Today's date: 10/8/2018   Patients name: Lynsey Robb  : 1965  MRN: [id number]  Safety measures: head injury, fall risk   Referring provider: Florence You, *    Primary Diagnosis/Billing code: R48 8  Secondary Diagnosis/ Billing code: Z71 89, H33 292     Visit Tracking:  -Referring provider: Epic  -Billing guidelines: AMA  -Visit #7  -[employer]  -RE due 10/25/2018  Subjective/Behavioral:  "It just depends what I'm doing, then I'll get a headache  But I think i'm improving " Currently no HA  Objective/Assessment:  Pt brought completed HEP  Short-term goals:  1  Patient will be educated on word finding strategies (i e , circumlocution) for improved generative naming and verbal expression skills (to be achieved in 1-2 weeks)  2  Patient will name an average of 15+ items in a category in 60 seconds over 5 trials using compensatory strategies to facilitate improved word retrieval skills (to be achieved in 4-6 weeks)  Task completed over 5 trials  Avg of 14 6 words  Pt using visualization strategy independently  3  Patient will name an average of 10+ words that begin with a specific letter in 60 seconds over 5 trials using compensatory strategies to facilitate improved word retrieval skills (to be achieved in 4-6 weeks)      4  Patient will complete word generation tasks (e g , synonyms/antonyms, analogies, category matrices, etc ) with 80% accuracy using word finding strategies to facilitate improved word retrieval skills (to be achieved in 4-6 weeks)  To target word generation, pt completed a word puzzle by answering written clues and filling in grid to make a word  Pt completed task in 6/8 opp independently, increasing to 7/8 with mod phonemic cues        5  Patient will complete auditory immediate and short term memory tasks to 80% accuracy to facilitate increased ability to retell narratives and recall information within functional living environment (to be achieved in 4-6 weeks)  6  Patient will complete thought organization tasks (e g , sequencing, deduction puzzles, etc ) with 80% accuracy to facilitate increased executive functioning skills (to be achieved in 4-6 weeks)  To target reasoning and executive functioning skills, patient was asked to complete deduction puzzles  Using written clues provided, patient completed the following thought organization task;    (#1: 3x5): min-mod cues to complete  Completed to 100% acc with cues  Pt used learned strategy (using color coding with highlighters for details)  Plan:  -Patient was provided with home exercises/activities to target goals in plan of care at the end of today's session   -Continue with current plan of care
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
## 2018-10-11 ENCOUNTER — OFFICE VISIT (OUTPATIENT)
Dept: OCCUPATIONAL THERAPY | [place] CLINIC | Age: 53
End: 2018-10-11
Payer: COMMERCIAL

## 2018-10-11 ENCOUNTER — OFFICE VISIT (OUTPATIENT)
Dept: SPEECH THERAPY | [place] CLINIC | Age: 53
End: 2018-10-11
Payer: COMMERCIAL

## 2018-10-11 DIAGNOSIS — G44.309 POST-CONCUSSION HEADACHE: ICD-10-CM

## 2018-10-11 DIAGNOSIS — R48.8 OTHER SYMBOLIC DYSFUNCTIONS: Primary | ICD-10-CM

## 2018-10-11 DIAGNOSIS — Z71.89 HEAD INJURY CONSULTATION: ICD-10-CM

## 2018-10-11 DIAGNOSIS — G44.309 POST-CONCUSSION HEADACHE: Primary | ICD-10-CM

## 2018-10-11 PROCEDURE — 92507 TX SP LANG VOICE COMM INDIV: CPT | Performed by: SPEECH-LANGUAGE PATHOLOGIST

## 2018-10-11 PROCEDURE — 97530 THERAPEUTIC ACTIVITIES: CPT

## 2018-10-15 ENCOUNTER — OFFICE VISIT (OUTPATIENT)
Dept: SPEECH THERAPY | [place] CLINIC | Age: 53
End: 2018-10-15
Payer: COMMERCIAL

## 2018-10-15 ENCOUNTER — OFFICE VISIT (OUTPATIENT)
Dept: PHYSICAL THERAPY | [place] CLINIC | Age: 53
End: 2018-10-15
Payer: COMMERCIAL

## 2018-10-15 ENCOUNTER — OFFICE VISIT (OUTPATIENT)
Dept: OCCUPATIONAL THERAPY | [place] CLINIC | Age: 53
End: 2018-10-15
Payer: COMMERCIAL

## 2018-10-15 DIAGNOSIS — G44.309 POST-CONCUSSION HEADACHE: Primary | ICD-10-CM

## 2018-10-15 DIAGNOSIS — R48.8 OTHER SYMBOLIC DYSFUNCTIONS: Primary | ICD-10-CM

## 2018-10-15 DIAGNOSIS — Z71.89 HEAD INJURY CONSULTATION: ICD-10-CM

## 2018-10-15 DIAGNOSIS — G44.309 POST-CONCUSSION HEADACHE: ICD-10-CM

## 2018-10-15 PROCEDURE — 97112 NEUROMUSCULAR REEDUCATION: CPT | Performed by: PHYSICAL THERAPIST

## 2018-10-15 PROCEDURE — 97014 ELECTRIC STIMULATION THERAPY: CPT | Performed by: PHYSICAL THERAPIST

## 2018-10-15 PROCEDURE — 92507 TX SP LANG VOICE COMM INDIV: CPT

## 2018-10-15 PROCEDURE — 97530 THERAPEUTIC ACTIVITIES: CPT

## 2018-10-15 NOTE — PROGRESS NOTES
Daily Note     Today's date: 10/15/2018  Patient name: Consuella Dakins  : 1965  MRN: [id number]  Referring provider: Dhruv Linares PA-C  Dx:   Encounter Diagnosis     ICD-10-CM    1  Post-concussion headache G44 309          Subjective: 3/10 HA upon arrival   Did have some dizziness when shuffling through her phone this morning  Tried to go to the gym and attempt the bike  Unable due to increasing HA  Objective: See treatment diary below  Precautions: n/a    Daily Treatment Diary     Manual  10/8/18 10/15   10/4   STM        SOR in supine x3min x3min   x3min   Gentle cervical distraction in supine x3min x3min   x3min                       Exercise Diary  10/08 10/15   10/4   UT stretch        LS stretch        Chin tucks        TB extension        TB rows        TB ER        saccades Seated busy  H 30"x2  V 30" x2 Standing busy (mirror)  2x30s ea H/V   Standing plain H/V 2x45s ea   amb w/ HT/HN In rosenberg  2x40' ea In rosenberg  2x40' ea   In rosenberg  2x40' ea   amb w/ 360* turns In rosenberg  2x40' In rosenberg  2x40' ea   In rosenberg 2x40'   3 finger chin tuck rotation        Self trp release cane        VOR x 1  Seated busy  H 30"x2  V 30" x2 Standing busy (mirror)  2x30s ea H/V   Standing plain H/V 2x45s ea   VORcx, H/V standing Seated busy  H 30"x2  V 30" x2 Standing busy (mirror)  2x30s ea H/V   In rosenberg  2x40'   Foam FT EC 3x30s semitandem  EC foam  3x30s   3x30s   Tandem gait In rosenberg  40' EO  40' EC In rosenberg EO  2x40'   // bars  3 laps   Ball toss hand to hand Standing on foam  x1min    Standing x1min   Bike Recumbent L1  x2 5min  HA pre 0/10  HR pre 75 Recumbent L1  x5min  HA pre 3/10  HA post 4-5/10   NV                               Modalities  10/15   10/4   MHP/TENS In supine  x10 min In supine  x10 min   In supine  x10 min                       Assessment:  Progressed to standing with oculomotor exercises in front of mirror    Pt had difficulty focusing on target however with decreasing speed able to maintain focus on letters  Able to increase time slightly on bike today, still limited by HA  Balance exercises limited today due to pt wearing high heels (stated next visit will bring sneakers)  Plan: Continue increasing bike time as pt able
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
## 2018-10-15 NOTE — PROGRESS NOTES
Daily Speech Treatment Note    Today's date: 10/15/2018  Patients name: Concha Coronel  : 1965  MRN: [id number]  Safety measures: head injury, fall risk   Referring provider: Miquel Villavicencio, *    Primary Diagnosis/Billing code: R48 8  Secondary Diagnosis/ Billing code: Z71 89, E11 179     Visit Tracking:  -Referring provider: Epic  -Billing guidelines: AMA  -Visit #9  -[employer]  -RE due 10/25/2018  Subjective/Behavioral:  "just pressure on the right head and the right eye " Reports no HA currently despite pressure  Objective/Assessment:  -Reviewed HEP; required additional mod verbal semantic cues to complete Double Meaning Deductions to 100% accuracy  Short-term goals:  1  Patient will be educated on word finding strategies (i e , circumlocution) for improved generative naming and verbal expression skills (to be achieved in 1-2 weeks)  2  Patient will name an average of 15+ items in a category in 60 seconds over 5 trials using compensatory strategies to facilitate improved word retrieval skills (to be achieved in 4-6 weeks)  3  Patient will name an average of 10+ words that begin with a specific letter in 60 seconds over 5 trials using compensatory strategies to facilitate improved word retrieval skills (to be achieved in 4-6 weeks)      4  Patient will complete word generation tasks (e g , synonyms/antonyms, analogies, category matrices, etc ) with 80% accuracy using word finding strategies to facilitate improved word retrieval skills (to be achieved in 4-6 weeks)  5  Patient will complete auditory immediate and short term memory tasks to 80% accuracy to facilitate increased ability to retell narratives and recall information within functional living environment (to be achieved in 4-6 weeks)      6  Patient will complete thought organization tasks (e g , sequencing, deduction puzzles, etc ) with 80% accuracy to facilitate increased executive functioning skills (to be achieved in 4-6 weeks)  -Pt completed Combined Associated Words worksheet to target visual reasoning and sequencing  Pt was provided with 2 written clues and asked to cancel out target "words" (i e  "Cross out the animal and leave its home")  Task completed in 11/14 independently, increasing to 14/14 with min verbal semantic cues  Extended processing time required for this task  - To target reasoning and executive functioning skills, patient was asked to complete deduction puzzles  Using written clues provided, patient completed the following thought organization task;      (Puzzle 1- 2x5): attempted independently, patient required min cues to complete to 100% acc  Pt using different colored highlighters to stay organized within each category  (Puzzle 2- 2x5): attempted independently, patient required mod verbal semantic cues to complete to 100% acc  Pt using different colored highlighters to stay organized within each category  Required min verbal semantic cues for pt to improve reasoning on this puzzle  Mod cues appearing to be helpful  Plan:  -Patient was provided with home exercises/activities to target goals in plan of care at the end of today's session   -Continue with current plan of care

## 2018-10-15 NOTE — TELEPHONE ENCOUNTER
Patient came in to check on a form to see if it was completed the 2wk turn around is up on 10/17 patient will stop back in on that date
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
## 2018-10-16 NOTE — TELEPHONE ENCOUNTER
Disability form is askin  What specific restrictions have you placed on patient? 2  Could pt return to work if accommodations were made for the listed restrictions? 3  If not, best estimate of when pt can return      Office note does state "patient is on FMLA - return to work will be based on clearance by PT/OT/Cognitive therapy and Vision therapy and at [facility] point it is indeterminate "
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
## 2018-10-17 NOTE — TELEPHONE ENCOUNTER
Form completed and signed  Faxed to [facility] for pt to return call  Need fax # or pt can

## 2018-10-18 ENCOUNTER — OFFICE VISIT (OUTPATIENT)
Dept: PHYSICAL THERAPY | [place] CLINIC | Age: 53
End: 2018-10-18
Payer: COMMERCIAL

## 2018-10-18 ENCOUNTER — EVALUATION (OUTPATIENT)
Dept: OCCUPATIONAL THERAPY | [place] CLINIC | Age: 53
End: 2018-10-18
Payer: COMMERCIAL

## 2018-10-18 ENCOUNTER — OFFICE VISIT (OUTPATIENT)
Dept: SPEECH THERAPY | [place] CLINIC | Age: 53
End: 2018-10-18
Payer: COMMERCIAL

## 2018-10-18 DIAGNOSIS — Z71.89 HEAD INJURY CONSULTATION: Primary | ICD-10-CM

## 2018-10-18 DIAGNOSIS — R48.8 OTHER SYMBOLIC DYSFUNCTIONS: Primary | ICD-10-CM

## 2018-10-18 DIAGNOSIS — G44.309 POST-CONCUSSION HEADACHE: Primary | ICD-10-CM

## 2018-10-18 DIAGNOSIS — G44.309 POST-CONCUSSION HEADACHE: ICD-10-CM

## 2018-10-18 DIAGNOSIS — Z71.89 HEAD INJURY CONSULTATION: ICD-10-CM

## 2018-10-18 PROCEDURE — 97530 THERAPEUTIC ACTIVITIES: CPT

## 2018-10-18 PROCEDURE — G8990 OTHER PT/OT CURRENT STATUS: HCPCS

## 2018-10-18 PROCEDURE — 92507 TX SP LANG VOICE COMM INDIV: CPT | Performed by: SPEECH-LANGUAGE PATHOLOGIST

## 2018-10-18 PROCEDURE — 97150 GROUP THERAPEUTIC PROCEDURES: CPT | Performed by: PHYSICAL THERAPIST

## 2018-10-18 PROCEDURE — G8991 OTHER PT/OT GOAL STATUS: HCPCS

## 2018-10-18 PROCEDURE — 97014 ELECTRIC STIMULATION THERAPY: CPT | Performed by: PHYSICAL THERAPIST

## 2018-10-18 PROCEDURE — 97010 HOT OR COLD PACKS THERAPY: CPT | Performed by: PHYSICAL THERAPIST

## 2018-10-18 PROCEDURE — 97112 NEUROMUSCULAR REEDUCATION: CPT | Performed by: PHYSICAL THERAPIST

## 2018-10-18 NOTE — PROGRESS NOTES
Daily Speech Treatment Note    Today's date: 10/18/2018  Patients name: Myrtle Noyola  : 1965  MRN: [id number]  Safety measures: head injury, fall risk   Referring provider: Migel Carmichael, Oneil    Primary Diagnosis/Billing code: R48 8  Secondary Diagnosis/ Billing code: Z71 89, [id number] [id number]     Visit Tracking:  -Referring provider: Epic  -Billing guidelines: AMA  -Visit #10   -[employer]  -RE due 10/25/2018  Subjective/Behavioral:  -Patient reported that she did not have a HA upon arrival to ST following PT session  Patient received stim from TENS unit in PT until 10:10am--10 minutes late to ST      Objective/Assessment:  -Patient did not complete HEP worksheets--patient indicated that she had a stomachache for the past 2 days  Short-term goals:  1  Patient will be educated on word finding strategies (i e , circumlocution) for improved generative naming and verbal expression skills (to be achieved in 1-2 weeks)  2  Patient will name an average of 15+ items in a category in 60 seconds over 5 trials using compensatory strategies to facilitate improved word retrieval skills (to be achieved in 4-6 weeks)  3  Patient will name an average of 10+ words that begin with a specific letter in 60 seconds over 5 trials using compensatory strategies to facilitate improved word retrieval skills (to be achieved in 4-6 weeks)      4  Patient will complete word generation tasks (e g , synonyms/antonyms, analogies, category matrices, etc ) with 80% accuracy using word finding strategies to facilitate improved word retrieval skills (to be achieved in 4-6 weeks)  5  Patient will complete auditory immediate and short term memory tasks to 80% accuracy to facilitate increased ability to retell narratives and recall information within functional living environment (to be achieved in 4-6 weeks)    -Word/mental picture association (whole/part & paired coding): Patient was asked to code words based on associations (e g , When I say CHAIR, you say CUSHION  )  In order to code and memorize two lists of 10 words, patient was presented with 4 words at a time and asked to code the words in the order  On an immediate recall task, patient recalled 20/20 words  Then, another activity (see below) was used to distract patient for 10 minutes  On a 10-min delayed recall task, patient recalled 20/00 words using the strategy of word/mental picture associations  -Mental manipulation/thought organization: Clinician presented patient with 5 words aloud and asked patient to rearrange words to form into a logical sentence (e g , [id number] = HE GOT THE MAIL    Task completed in 5/10 opp (50%) independently, increasing to 9/10 opp (90% acc) with min verbal repetition cues  6  Patient will complete thought organization tasks (e g , sequencing, deduction puzzles, etc ) with 80% accuracy to facilitate increased executive functioning skills (to be achieved in 4-6 weeks)  DISTRACTOR TASKS:  -Thought organization/reasoning: Patient was presented with instructions to separate combined words with a clue (e g , two animals cdaotg = CAT DOG)  Task completed in 11/14 opp (79%) independently, increasing to 14/14 opp (100% acc) with min verbal cues  -Thought organization/reasoning: Patient was presented with instructions to separate combined words with a clue (e g , Cross out the bird and leave its home   rnoebisnt = SHAYLEE  NEST)  Task completed in 13/14 opp (93%) independently, increasing to 14/14 opp (100% acc) with min verbal cues  Plan:  -Patient was provided with home exercises/activities to target goals in plan of care at the end of today's session   -Continue with current plan of care

## 2018-10-18 NOTE — PROGRESS NOTES
Daily Note     Today's date: 10/18/2018  Patient name: Marcela De Anda  : 1965  MRN: [id number]  Referring provider: Magda Sutton PA-C  Dx:   Encounter Diagnosis     ICD-10-CM    1  Post-concussion headache G44 309    2  Head injury consultation Z71 89          Subjective: No complaints upon arrival   PT first session today  Wore sneakers        Objective: See treatment diary below  0900 - 0915 unsuprverised  0925 - 0988 group  0962 - 9469 1:1    Precautions: n/a    Daily Treatment Diary     Manual  10/8/18 10/15 10/18  10/4   STM        SOR in supine x3min x3min x3min  x3min   Gentle cervical distraction in supine x3min x3min x3min  x3min                       Exercise Diary  10/08 10/15 10/18  10/4   UT stretch        LS stretch        Chin tucks        TB extension        TB rows        TB ER        saccades Seated busy  H 30"x2  V 30" x2 Standing busy (mirror)  2x30s ea H/V Standing busy (bubbles)  2x45s ea H/V  Standing plain H/V 2x45s ea   amb w/ HT/HN In rosenberg  2x40' ea In rosenberg  2x40' ea In rosenberg  2x40' ea  In rosenberg  2x40' ea   amb w/ 360* turns In rosenberg  2x40' In rosenberg  2x40' In rosenberg  2x40' EO  2x40' EC  In rosenberg 2x40'   3 finger chin tuck rotation        Foam HTs, H/V   FT EC  2x30s ea     VOR x 1  Seated busy  H 30"x2  V 30" x2 Standing busy (mirror)  2x30s ea H/V Standing busy (bubbles)  2x45s ea H/V  Standing plain H/V 2x45s ea   VORcx, H/V standing Seated busy  H 30"x2  V 30" x2 Standing busy (mirror)  2x30s ea H/V Standing busy (bubbles)  2x45s ea H/V  In rosenberg  2x40'   Foam FT EC 3x30s semitandem  EC foam  3x30s semitandem  EC foam  3x30s  3x30s   Tandem gait In rosenberg  40' EO  40' EC In rosenberg EO  2x40' In rosenberg EC  2x40'  // bars  3 laps   Ball toss hand to hand Standing on foam  x1min    Standing x1min   Bike Recumbent L1  x2 5min  HA pre 0/10  HR pre 75 Recumbent L1  x5min  HA pre 3/10  HA post 4-5/10 Recumbent L1 x10 min post-c/o fogginess  NV                               Modalities  10/15 10/18 10/4   MHP/TENS neck In supine  x10 min In supine  x10 min In supine  x10 min  In supine  x10 min                       Assessment:  Pt able to progress to [facility] with tandem gait today showing improved balance, required occasional CGA for balance  Also able to add HTs to foam exercises  Still limited slightly due to dizziness and eye pressure with oculomotor exercises however able to complete with busy bubbles today  Plan: Add step-ups for exertion and balance training NV

## 2018-10-19 NOTE — PROGRESS NOTES
Daily Speech Treatment Note    Today's date: 10/22/2018   Patients name: Stephen Pollard  : 1965  MRN: [id number]  Safety measures: head injury, fall risk   Referring provider: Rivas Gonzalez, *    Primary Diagnosis/Billing code: R48 8  Secondary Diagnosis/ Billing code: Z71 89, [id number] [id number]     Visit Tracking:  -Referring provider: Epic  -Billing guidelines: AMA  -Visit #11  -[employer]  -RE due 10/25/2018  Subjective/Behavioral:  "No headache this morning"     Objective/Assessment:  HEP completed to 90%; min cues to complete to 100%    Short-term goals:  1  Patient will be educated on word finding strategies (i e , circumlocution) for improved generative naming and verbal expression skills (to be achieved in 1-2 weeks)  2  Patient will name an average of 15+ items in a category in 60 seconds over 5 trials using compensatory strategies to facilitate improved word retrieval skills (to be achieved in 4-6 weeks)  3  Patient will name an average of 10+ words that begin with a specific letter in 60 seconds over 5 trials using compensatory strategies to facilitate improved word retrieval skills (to be achieved in 4-6 weeks)      To target generative naming skills; patient participated in "[employer]" game where they were asked to provide a word when given a specific letter and category (i e , boys name __/L/ = Arnette Cam)  Task completed over 2 trials in  opp  Increased to  with verbal cues  4  Patient will complete word generation tasks (e g , synonyms/antonyms, analogies, category matrices, etc ) with 80% accuracy using word finding strategies to facilitate improved word retrieval skills (to be achieved in 4-6 weeks)  5  Patient will complete auditory immediate and short term memory tasks to 80% accuracy to facilitate increased ability to retell narratives and recall information within functional living environment (to be achieved in 4-6 weeks)      Sorting/Remembering Categories: Sorting completed in 16/16 opp  Immediate recall: 14/16    6  Patient will complete thought organization tasks (e g , sequencing, deduction puzzles, etc ) with 80% accuracy to facilitate increased executive functioning skills (to be achieved in 4-6 weeks)  To target divided attention, the card game "BLINK" was introduced (shapes/colors/numbers)  Patient was first asked to separate cards into two piles; only allowing to have 3 cards in their hand at a time  Patient to discard/match into piles when meeting one of the three criteria (color, shape or number)  Patient required min-mod cues to begin task, and realize when a match could be made  Patient completed in timely manner to 75% acc  Plan:  -Patient was provided with home exercises/activities to target goals in plan of care at the end of today's session   -Continue with current plan of care

## 2018-10-22 ENCOUNTER — EVALUATION (OUTPATIENT)
Dept: PHYSICAL THERAPY | [place] CLINIC | Age: 53
End: 2018-10-22
Payer: COMMERCIAL

## 2018-10-22 ENCOUNTER — OFFICE VISIT (OUTPATIENT)
Dept: OCCUPATIONAL THERAPY | [place] CLINIC | Age: 53
End: 2018-10-22
Payer: COMMERCIAL

## 2018-10-22 ENCOUNTER — OFFICE VISIT (OUTPATIENT)
Dept: SPEECH THERAPY | [place] CLINIC | Age: 53
End: 2018-10-22
Payer: COMMERCIAL

## 2018-10-22 DIAGNOSIS — G44.309 POST-CONCUSSION HEADACHE: Primary | ICD-10-CM

## 2018-10-22 DIAGNOSIS — Z71.89 HEAD INJURY CONSULTATION: Primary | ICD-10-CM

## 2018-10-22 DIAGNOSIS — Z71.89 HEAD INJURY CONSULTATION: ICD-10-CM

## 2018-10-22 DIAGNOSIS — R48.8 OTHER SYMBOLIC DYSFUNCTIONS: Primary | ICD-10-CM

## 2018-10-22 DIAGNOSIS — G44.309 POST-CONCUSSION HEADACHE: ICD-10-CM

## 2018-10-22 PROCEDURE — 92507 TX SP LANG VOICE COMM INDIV: CPT

## 2018-10-22 PROCEDURE — G8978 MOBILITY CURRENT STATUS: HCPCS | Performed by: PHYSICAL THERAPIST

## 2018-10-22 PROCEDURE — 97530 THERAPEUTIC ACTIVITIES: CPT

## 2018-10-22 PROCEDURE — 97112 NEUROMUSCULAR REEDUCATION: CPT | Performed by: PHYSICAL THERAPIST

## 2018-10-22 PROCEDURE — 97110 THERAPEUTIC EXERCISES: CPT | Performed by: PHYSICAL THERAPIST

## 2018-10-22 PROCEDURE — G8979 MOBILITY GOAL STATUS: HCPCS | Performed by: PHYSICAL THERAPIST
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
## 2018-10-22 NOTE — PROGRESS NOTES
Daily Note     Today's date: 10/22/2018  Patient name: Nelson Salgado  : 1965  MRN: [id number]  Referring provider: Jones Valderrama PA-C  Dx:   Encounter Diagnosis     ICD-10-CM    1  Post-concussion headache G44 309    2  Head injury consultation Z71 89          Subjective: Discussed progress update today  HA ranges from a 5-8/10; dizziness ranges from a 0-6/10  Neck ranges from a 2-5/10  Currently 5/10 HA, no dizziness, PT was last discipline today  Thinks therapy has helped  Wants to continue  Sees vision specialist in November        Objective: See treatment diary below  Cervical rotation R: 55 deg; L: 50 deg (AROM measured in supine)  DHI: 96/100  Precautions: n/a    Daily Treatment Diary     Manual  10/8/18 10/15 10/18 10/22 10   STM        SOR in supine x3min x3min x3min x3min x3min   Gentle cervical distraction in supine x3min x3min x3min x3min x3min   Contract/Relax cervical rotation in supine    To L  x5 reps  6sholds/stretch                Exercise Diary  10/08 10/15 10/18 10/22 104   UT stretch        LS stretch        Chin tucks        TB extension        TB rows        TB ER        saccades Seated busy  H 30"x2  V 30" x2 Standing busy (mirror)  2x30s ea H/V Standing busy (bubbles)  2x45s ea H/V Standing busy (bubbles)  2x45s ea H/V Standing plain H/V 2x45s ea   amb w/ HT/HN In rosenberg  2x40' ea In rosenberg  2x40' ea In rosenberg  2x40' ea  In rosenberg  2x40' ea   amb w/ 360* turns In rosenberg  2x40' In rosenberg  2x40' In rosenberg  2x40' EO  2x40' EC  In rosenberg 2x40'   3 finger chin tuck rotation        Foam HTs, H/V   FT EC  2x30s ea FT EC  2x30s ea    VOR x 1  Seated busy  H 30"x2  V 30" x2 Standing busy (mirror)  2x30s ea H/V Standing busy (bubbles)  2x45s ea H/V Standing busy (bubbles)  2x45s ea H/V Standing plain H/V 2x45s ea   VORcx, H/V standing Seated busy  H 30"x2  V 30" x2 Standing busy (mirror)  2x30s ea H/V Standing busy (bubbles)  2x45s ea H/V Standing busy (bubbles)  2x45s ea H/V In rosenberg  2x40'   Foam FT EC 3x30s semitandem  EC foam  3x30s semitandem  EC foam  3x30s semitandem  EC foam  4x30s 3x30s   Tandem gait In rosenberg  40' EO  40' EC In rosenberg EO  2x40' In rosenberg EC  2x40' // bars  EC 2 laps // bars  3 laps   Ball toss hand to hand Standing on foam  x1min    Standing x1min   Bike Recumbent L1  x2 5min  HA pre 0/10  HR pre 75 Recumbent L1  x5min  HA pre 3/10  HA post 4-5/10 Recumbent L1 x10 min post-c/o fogginess Recumbent L1 x10 min  (on fat burn mode) NV                               Modalities  10/15 10/18 10/22 10/4   MHP/TENS neck In supine  x10 min In supine  x10 min In supine  x10 min In supine  x10 min In supine  x10 min                       Assessment:  Progress update completed today  Pt has made good progress towards her reduction in symptoms  Also showing good progression with her exercises  Continues to be limited by HA and dizziness however has improved since eval   Pt will benefit from continued PT services  Plan: Continue PT 2x/ week for an additional 4-6 weeks needed to continue to address goals below and work towards further reduction in HA, neck pain, and dizziness  **new cert period from today 10/22/18 through 12/3/18**    Goals  ST  Patient will demonstrate improve left cervical ROT by 5* or greater within 4 weeks - unsure (measured differently than prior therapist, per pt has improved)  2  Patient will report reduction of HA pain at worst to 8/10 or less within 4weeks - met  3  Patient will report a reduction in CS at worst pain to 8/10 or less within 4 weeks - met  4   Patient will demonstrate a reduced [facility] score by 18 points or more within 4 weeks - not met    LTG  Patient will demonstrate full CS ROM for left ROT within 8 weeks  Patient will report reduced frequency of HA's to 2x/week or less within 8 weeks  Patient will report a reduction in HA intensity at best at 4/10 or less within 8 weeks   Patient will be able to return to work without onset of symptoms within 8 weeks  Patient will demonstrate a DH score of 30 or less, in mild range withiin 8 weeks

## 2018-10-23 ENCOUNTER — OFFICE VISIT (OUTPATIENT)
Dept: GASTROENTEROLOGY | [place] MEDICAL CENTER | Age: 53
End: 2018-10-23
Payer: COMMERCIAL

## 2018-10-23 VITALS
WEIGHT: 187.6 LBS | DIASTOLIC BLOOD PRESSURE: 84 MMHG | TEMPERATURE: 98.1 F | BODY MASS INDEX: 32.2 KG/M2 | SYSTOLIC BLOOD PRESSURE: 122 MMHG | HEART RATE: 86 BPM

## 2018-10-23 DIAGNOSIS — R13.10 DYSPHAGIA, UNSPECIFIED TYPE: ICD-10-CM

## 2018-10-23 DIAGNOSIS — K21.9 GASTROESOPHAGEAL REFLUX DISEASE WITHOUT ESOPHAGITIS: Primary | ICD-10-CM

## 2018-10-23 DIAGNOSIS — K22.81 ESOPHAGEAL POLYP: ICD-10-CM

## 2018-10-23 PROCEDURE — 99244 OFF/OP CNSLTJ NEW/EST MOD 40: CPT | Performed by: INTERNAL MEDICINE

## 2018-10-23 NOTE — LETTER
October 29, 2018     Beltran Llanos PA-C  [id number]    Patient: Myrtle Noyola   YOB: 1965   Date of Visit: 10/23/2018        Dear Mr Jose Francisco Shaffer: Thank you for referring Myrtle Noyola to me for evaluation  Below are my notes for this consultation  If you have questions, please do not hesitate to call me  I look forward to following your patient along with you  Sincerely,      WILL Curtis  [facility]  Mobile: [phone]  Available on RingTu  [email or web address]           CC: No Recipients  Steven Madden MD  10/29/2018  9:48 AM  Sign at close encounter  [address] [facility] - Outpatient Consultation  Myrtle Noyola 48 y o  female MRN: [id number]  Encounter: [id number]      PCP: Beltran Llanos PA-C  Referring: Beltran Llanos PA-C  [id number]      ASSESSMENT AND PLAN:      1  Dysphagia, unspecified type  2  Esophageal polyp  3  Gastroesophageal reflux disease without esophagitis  Personal history of esophageal polyps, reports not available with the to me of previous EGDs  Having recurrent symptoms of constant reflux in association with dysphagia  Differential includes Schatzki's ring versus peptic stricture versus eosinophilic esophagitis versus esophagitis  Discussed anti reflux measures  She should continue Zantac, will plan to add PPI pending EGD findings  Will plan for EGD   - Case request operating room: ESOPHAGOGASTRODUODENOSCOPY (EGD); Standing  - Case request operating room: ESOPHAGOGASTRODUODENOSCOPY (EGD)    Up to date with screening colonoscopy (hyperplastic, rectal polyp), performed 2015 at [facility]     ______________________________________________________________________    HPI:      Patient is a 70-year-old female referred for reflux and dysphagia    She has a past medical history of Bell's palsy, allergic rhinitis, arthritis, hyperlipidemia, obesity, posttraumatic concussion with headache  She reports a several month history of increasing dysphagia  The symptoms occur in response to food, especially rice intake  Symptoms are associated with shortness of breath  She drinks water to help food passed through her esophagus  She feels things get stuck in her mid epigastrium  The symptoms are similar to her past history where she was diagnosed with esophageal polyps approximately five years ago on her last EGD  She reports associated daily reflux symptoms  These occur her and her exacerbated by drinking coffee  There associated with acidic regurgitation  She takes Zantac in the morning, and will take an extra dose in the evening and her symptoms are persistent throughout the day  She does admit to Motrin use 2-3 times per week  Her last colonoscopy was also performed in 2015, and she was found to have rectal polyp  This was performed at [facility], surgical pathology available demonstrates hyperplastic polyp  REVIEW OF SYSTEMS:    CONSTITUTIONAL: Denies any fever, chills, rigors, and weight loss  HEENT: No earache or tinnitus  Denies hearing loss or visual disturbances  CARDIOVASCULAR: No chest pain or palpitations  RESPIRATORY: Denies any cough, hemoptysis, shortness of breath or dyspnea on exertion  GASTROINTESTINAL: As noted in the History of Present Illness  GENITOURINARY: No problems with urination  Denies any hematuria or dysuria  NEUROLOGIC: No dizziness or vertigo, denies headaches  MUSCULOSKELETAL: Denies any muscle or joint pain  SKIN: Denies skin rashes or itching  ENDOCRINE: Denies excessive thirst  Denies intolerance to heat or cold  PSYCHOSOCIAL: Denies depression or anxiety  Denies any recent memory loss         Historical Information   Past Medical History:   Diagnosis Date    Knee injury     right knee     Past Surgical History:   Procedure Laterality Date    LAPAROSCOPY      Exploratory     Social History History   Alcohol Use No     History   Drug Use No     History   Smoking Status    Former Smoker   Smokeless Tobacco    Never Used     Family History   Problem Relation Age of Onset    Diabetes Mother     Hypertension Mother     Heart disease Mother        Meds/Allergies       Current Outpatient Prescriptions:     acetaminophen (TYLENOL) 500 mg tablet    butalbital-acetaminophen-caffeine (FIORICET,ESGIC) -40 mg per tablet    nortriptyline (PAMELOR) 10 mg capsule    ranitidine (ZANTAC) 150 mg tablet    traMADol (ULTRAM) 50 mg tablet    Allergies   Allergen Reactions    Penicillin V Anaphylaxis    Penicillins            Objective     Blood pressure 122/84, pulse 86, temperature 98 1 °F (36 7 °C), temperature source Tympanic, weight 85 1 kg (187 lb 9 6 oz), not currently breastfeeding  Body mass index is 32 2 kg/m²  PHYSICAL EXAM:      General Appearance:   Alert, cooperative, no distress   HEENT:   Normocephalic, atraumatic, anicteric      Neck:  Supple, symmetrical, trachea midline   Lungs:   Clear to auscultation bilaterally; no rales, rhonchi or wheezing; respirations unlabored    Heart[de-identified]   Regular rate and rhythm; no murmur, rub, or gallop     Abdomen:   Soft, non-tender, non-distended; normal bowel sounds; no masses, no organomegaly    Genitalia:   Deferred    Rectal:   Deferred    Extremities:  No cyanosis, clubbing or edema    Pulses:  2+ and symmetric    Skin:  No jaundice, rashes, or lesions    Lymph nodes:  No palpable cervical lymphadenopathy        Lab Results:     Lab Results   Component Value Date    WBC 5 24 03/10/2018    HGB 12 7 03/10/2018    HCT 37 9 03/10/2018    MCV 85 03/10/2018     03/10/2018       Lab Results   Component Value Date     05/18/2018    K 3 9 05/18/2018     05/18/2018    CO2 30 05/18/2018    ANIONGAP 6 05/08/2014    BUN 12 05/18/2018    CREATININE 0 70 05/18/2018    GLUCOSE 102 05/08/2014    CALCIUM 9 2 05/18/2018    AST 13 10/17/2016    ALT 26 10/17/2016    ALKPHOS 47 10/17/2016    EGFR 99 05/18/2018       Lab Results   Component Value Date    INR 0 94 03/09/2018    PROTIME 12 6 03/09/2018         Radiology Results:   No results found

## 2018-10-24 NOTE — PROGRESS NOTES
[address] [facility] - Outpatient Consultation  Stephen Score 48 y o  female MRN: [id number]  Encounter: [id number]      PCP: Alex Norman PA-C  Referring: Alex Norman PA-C  [address] [facility]      ASSESSMENT AND PLAN:      1  Dysphagia, unspecified type  2  Esophageal polyp  3  Gastroesophageal reflux disease without esophagitis  Personal history of esophageal polyps, reports not available with the to me of previous EGDs  Having recurrent symptoms of constant reflux in association with dysphagia  Differential includes Schatzki's ring versus peptic stricture versus eosinophilic esophagitis versus esophagitis  Discussed anti reflux measures  She should continue Zantac, will plan to add PPI pending EGD findings  Will plan for EGD   - Case request operating room: ESOPHAGOGASTRODUODENOSCOPY (EGD); Standing  - Case request operating room: ESOPHAGOGASTRODUODENOSCOPY (EGD)    Up to date with screening colonoscopy (hyperplastic, rectal polyp), performed 2015 at [facility]     ______________________________________________________________________    HPI:      Patient is a 55-year-old female referred for reflux and dysphagia  She has a past medical history of Bell's palsy, allergic rhinitis, arthritis, hyperlipidemia, obesity, posttraumatic concussion with headache  She reports a several month history of increasing dysphagia  The symptoms occur in response to food, especially rice intake  Symptoms are associated with shortness of breath  She drinks water to help food passed through her esophagus  She feels things get stuck in her mid epigastrium  The symptoms are similar to her past history where she was diagnosed with esophageal polyps approximately five years ago on her last EGD  She reports associated daily reflux symptoms  These occur her and her exacerbated by drinking coffee  There associated with acidic regurgitation    She takes Zantac in the morning, and will take an extra dose in the evening and her symptoms are persistent throughout the day  She does admit to Motrin use 2-3 times per week  Her last colonoscopy was also performed in 2015, and she was found to have rectal polyp  This was performed at [facility], surgical pathology available demonstrates hyperplastic polyp  REVIEW OF SYSTEMS:    CONSTITUTIONAL: Denies any fever, chills, rigors, and weight loss  HEENT: No earache or tinnitus  Denies hearing loss or visual disturbances  CARDIOVASCULAR: No chest pain or palpitations  RESPIRATORY: Denies any cough, hemoptysis, shortness of breath or dyspnea on exertion  GASTROINTESTINAL: As noted in the History of Present Illness  GENITOURINARY: No problems with urination  Denies any hematuria or dysuria  NEUROLOGIC: No dizziness or vertigo, denies headaches  MUSCULOSKELETAL: Denies any muscle or joint pain  SKIN: Denies skin rashes or itching  ENDOCRINE: Denies excessive thirst  Denies intolerance to heat or cold  PSYCHOSOCIAL: Denies depression or anxiety  Denies any recent memory loss         Historical Information   Past Medical History:   Diagnosis Date    Knee injury     right knee     Past Surgical History:   Procedure Laterality Date    LAPAROSCOPY      Exploratory     Social History   History   Alcohol Use No     History   Drug Use No     History   Smoking Status    Former Smoker   Smokeless Tobacco    Never Used     Family History   Problem Relation Age of Onset    Diabetes Mother     Hypertension Mother     Heart disease Mother        Meds/Allergies       Current Outpatient Prescriptions:     acetaminophen (TYLENOL) 500 mg tablet    butalbital-acetaminophen-caffeine (FIORICET,ESGIC) -40 mg per tablet    nortriptyline (PAMELOR) 10 mg capsule    ranitidine (ZANTAC) 150 mg tablet    traMADol (ULTRAM) 50 mg tablet    Allergies   Allergen Reactions    Penicillin V Anaphylaxis    Penicillins            Objective Blood pressure 122/84, pulse 86, temperature 98 1 °F (36 7 °C), temperature source Tympanic, weight 85 1 kg (187 lb 9 6 oz), not currently breastfeeding  Body mass index is 32 2 kg/m²  PHYSICAL EXAM:      General Appearance:   Alert, cooperative, no distress   HEENT:   Normocephalic, atraumatic, anicteric      Neck:  Supple, symmetrical, trachea midline   Lungs:   Clear to auscultation bilaterally; no rales, rhonchi or wheezing; respirations unlabored    Heart[de-identified]   Regular rate and rhythm; no murmur, rub, or gallop  Abdomen:   Soft, non-tender, non-distended; normal bowel sounds; no masses, no organomegaly    Genitalia:   Deferred    Rectal:   Deferred    Extremities:  No cyanosis, clubbing or edema    Pulses:  2+ and symmetric    Skin:  No jaundice, rashes, or lesions    Lymph nodes:  No palpable cervical lymphadenopathy        Lab Results:     Lab Results   Component Value Date    WBC 5 24 03/10/2018    HGB 12 7 03/10/2018    HCT 37 9 03/10/2018    MCV 85 03/10/2018     03/10/2018       Lab Results   Component Value Date     05/18/2018    K 3 9 05/18/2018     05/18/2018    CO2 30 05/18/2018    ANIONGAP 6 05/08/2014    BUN 12 05/18/2018    CREATININE 0 70 05/18/2018    GLUCOSE 102 05/08/2014    CALCIUM 9 2 05/18/2018    AST 13 10/17/2016    ALT 26 10/17/2016    ALKPHOS 47 10/17/2016    EGFR 99 05/18/2018       Lab Results   Component Value Date    INR 0 94 03/09/2018    PROTIME 12 6 03/09/2018         Radiology Results:   No results found

## 2018-10-25 ENCOUNTER — OFFICE VISIT (OUTPATIENT)
Dept: OCCUPATIONAL THERAPY | [place] CLINIC | Age: 53
End: 2018-10-25
Payer: COMMERCIAL

## 2018-10-25 ENCOUNTER — OFFICE VISIT (OUTPATIENT)
Dept: PHYSICAL THERAPY | [place] CLINIC | Age: 53
End: 2018-10-25
Payer: COMMERCIAL

## 2018-10-25 ENCOUNTER — EVALUATION (OUTPATIENT)
Dept: SPEECH THERAPY | [place] CLINIC | Age: 53
End: 2018-10-25
Payer: COMMERCIAL

## 2018-10-25 ENCOUNTER — TELEPHONE (OUTPATIENT)
Dept: NEUROLOGY | [place] CLINIC | Age: 53
End: 2018-10-25

## 2018-10-25 DIAGNOSIS — G44.309 POST-CONCUSSION HEADACHE: Primary | ICD-10-CM

## 2018-10-25 DIAGNOSIS — G44.309 POST-CONCUSSION HEADACHE: ICD-10-CM

## 2018-10-25 DIAGNOSIS — R48.8 OTHER SYMBOLIC DYSFUNCTIONS: Primary | ICD-10-CM

## 2018-10-25 DIAGNOSIS — Z71.89 HEAD INJURY CONSULTATION: ICD-10-CM

## 2018-10-25 PROCEDURE — 97112 NEUROMUSCULAR REEDUCATION: CPT | Performed by: PHYSICAL THERAPIST

## 2018-10-25 PROCEDURE — G9175 SPEECH LANG GOAL STATUS: HCPCS

## 2018-10-25 PROCEDURE — G9174 SPEECH LANG CURRENT STATUS: HCPCS

## 2018-10-25 PROCEDURE — 97014 ELECTRIC STIMULATION THERAPY: CPT | Performed by: PHYSICAL THERAPIST

## 2018-10-25 PROCEDURE — 97140 MANUAL THERAPY 1/> REGIONS: CPT | Performed by: PHYSICAL THERAPIST

## 2018-10-25 PROCEDURE — 97010 HOT OR COLD PACKS THERAPY: CPT | Performed by: PHYSICAL THERAPIST

## 2018-10-25 PROCEDURE — 97535 SELF CARE MNGMENT TRAINING: CPT

## 2018-10-25 PROCEDURE — 96125 COGNITIVE TEST BY HC PRO: CPT

## 2018-10-25 NOTE — TELEPHONE ENCOUNTER
Pt came in to drop off Medical Request Form to be filled out by our office, pt is aware it is a 2 week turn around rate  Please call pt after paperwork is completed  Pt is aware about the fee starting November 2018  Please call pt when paperwork is fax and completed  Paperwork placed in clinical bin
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
## 2018-10-25 NOTE — PROGRESS NOTES
Daily Note     Today's date: 10/25/2018  Patient name: Marcela De Anda  : 1965  MRN: [id number]  Referring provider: Magda Sutton PA-C  Dx:   Encounter Diagnosis     ICD-10-CM    1  Post-concussion headache G44 309    2  Head injury consultation Z71 89          Subjective: Minimal complaints upon arrival   No numerical ratings of HA of dizziness during session  Stated HA was pressure and dizziness only when doing exercises in rosenberg  Pt 15 min late today  Bike deferred due to this        Objective: See treatment diary below    Precautions: n/a    Daily Treatment Diary     Manual  10/8/18 10/15 10/18 10/22 10/25   STM        SOR in supine x3min x3min x3min x3min x3min   Gentle cervical distraction in supine x3min x3min x3min x3min x3min   Contract/Relax cervical rotation in supine    To L  x5 reps  6sholds/stretch To L  x5 reps  6sholds/stretch               Exercise Diary  10/08 10/15 10/18 10/22 10/25   UT stretch        LS stretch        Chin tucks        TB extension        TB rows        TB ER        saccades Seated busy  H 30"x2  V 30" x2 Standing busy (mirror)  2x30s ea H/V Standing busy (bubbles)  2x45s ea H/V Standing busy (bubbles)  2x45s ea H/V Standing busy (bubbles)  2x45s ea H/V   amb w/ HT/HN In rosenberg  2x40' ea In rosenberg  2x40' ea In [place]senberg  2x40' ea  BWD  2x40' ea   amb w/ 360* turns In rosenberg  2x40' In rosenberg  2x40' In rosenberg  2x40' EO  2x40' EC     3 finger chin tuck rotation        Foam HTs, H/V   FT EC  2x30s ea FT EC  2x30s ea FT EC  2x30s ea   VOR x 1  Seated busy  H 30"x2  V 30" x2 Standing busy (mirror)  2x30s ea H/V Standing busy (bubbles)  2x45s ea H/V Standing busy (bubbles)  2x45s ea H/V Standing busy (bubbles)  2x45s ea H/V   VORcx, H/V standing Seated busy  H 30"x2  V 30" x2 Standing busy (mirror)  2x30s ea H/V Standing busy (bubbles)  2x45s ea H/V Standing busy (bubbles)  2x45s ea H/V Walking in [place]senberg  2x40' ea   Foam FT EC 3x30s semitandem  EC foam  3x30s semitandem  EC foam  3x30s semitandem  EC foam  4x30s semitandem  EC foam  4x30s   Tandem gait In rosenberg  40' EO  40' EC In rosenberg EO  2x40' In [place]berg EC  2x40' // bars  EC 2 laps In [place]berg  4x30'   Ball toss hand to hand Standing on foam  x1min       Bike Recumbent L1  x2 5min  HA pre 0/10  HR pre 75 Recumbent L1  x5min  HA pre 3/10  HA post 4-5/10 Recumbent L1 x10 min post-c/o fogginess Recumbent L1 x10 min  (on fat burn mode) NP                               Modalities  10/15 10/18 10/22 10/25   MHP/TENS neck In supine  x10 min In supine  x10 min In supine  x10 min In supine  x10 min In supine  x10 min                       Assessment:  Minimal complaints during session, however vague about her complaints  Did not rate, stated she continues with pressure and dizziness with exercises however able to complete session without breaks  Plan: Resume exertion training NV
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
## 2018-10-26 NOTE — PROGRESS NOTES
Daily Speech Treatment Note    Today's date: 10/29/2018  Patients name: Uvaldo Albrecht  : 1965  MRN: [id number]  Safety measures: Head injury; fall risk  Referring provider: Malick Garza, *    Primary Diagnosis/Billing code: R48 8  Secondary Diagnosis/ Billing code: Z71 [id number], R52 [id number]     Visit Tracking:  -Referring provider: Epic  -Billing guidelines: AMA  -Visit #13    -[employer]  ([place] [employer])  -2018  Subjective/Behavioral:  -Patient reported that she had a HA this morning (pain: 5-6/10)  *When patient was brought back to treatment room, she was finishing a telephone call  Patient expressed to this clinician that she's concerned re: holding her job position, as well as paying this month's rent  Patient became tearful  Clinician f/u with patient re: behavioral health services  Patient stated that she's f/u in November  Objective/Assessment:  -Reviewed testing results and goals in plan care with patient  Patient is in agreement at this time  Short-term goals:  3  Patient will name an average of 10+ words that begin with a specific letter in 60 seconds over 5 trials using compensatory strategies to facilitate improved word retrieval skills (to be achieved in 4-6 weeks)  PARTIALLY MET     4  Patient will complete word generation tasks (e g , synonyms/antonyms, analogies, category matrices, etc ) with 80% accuracy using word finding strategies to facilitate improved word retrieval skills (to be achieved in 4-6 weeks)  PARTIALLY MET     5  Patient will complete auditory immediate and short term memory tasks to 80% accuracy to facilitate increased ability to retell narratives and recall information within functional living environment (to be achieved in 4-6 weeks)  PARTIALLY MET  -Recall: Patient was presented with a list of 16 words and asked to sort the words into four semantically-related categories   Following this, patient was educated to memorize each list using trained strategies of verbal repetition and categorization  Immediate memory task completed in 12/16 opp (75% acc)  Short-term memory task (15 minute delay) completed in 16/16 opp (100% acc)  -Mental manipulation/thought organization: Clinician presented patient with 5 words aloud and asked patient to rearrange words to form into a logical sentence (e g , [id number] = HE GOT THE MAIL    Task completed in 7/10 opp (70%) independently, increasing to 9/10 opp (90% acc) with min verbal repetition cues  -Immediate memory: Clinician presented patient with a series of 4 words aloud and asked patient to respond to a question (category inclusion) pertaining to the words (e g , Which are soft?, Which are cold? )  Task completed in 5/10 opp (50%) independently, increasing to 9/10 opp (90% acc) with min-mod verbal repetition cues  6  Patient will complete thought organization tasks (e g , sequencing, deduction puzzles, etc ) with 80% accuracy to facilitate increased executive functioning skills (to be achieved in 4-6 weeks)  PARTIALLY MET  -Convergent reasoning/problem solving: Patient was presented with visual/verbal reasoning puzzles (e g , 26 L of the A = 26 LETTERS OF THE ALPHABET) and asked to solve  Task completed in 6/12 opp (50%) independently--rest of worksheet presented as HEP  Plan:  -Patient was provided with home exercises/activities to target goals in plan of care at the end of today's session   -Continue with current plan of care
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
## 2018-10-29 ENCOUNTER — OFFICE VISIT (OUTPATIENT)
Dept: SPEECH THERAPY | [place] CLINIC | Age: 53
End: 2018-10-29
Payer: COMMERCIAL

## 2018-10-29 ENCOUNTER — OFFICE VISIT (OUTPATIENT)
Dept: OCCUPATIONAL THERAPY | [place] CLINIC | Age: 53
End: 2018-10-29
Payer: COMMERCIAL

## 2018-10-29 ENCOUNTER — OFFICE VISIT (OUTPATIENT)
Dept: PHYSICAL THERAPY | [place] CLINIC | Age: 53
End: 2018-10-29
Payer: COMMERCIAL

## 2018-10-29 DIAGNOSIS — R48.8 OTHER SYMBOLIC DYSFUNCTIONS: Primary | ICD-10-CM

## 2018-10-29 DIAGNOSIS — G44.309 POST-CONCUSSION HEADACHE: Primary | ICD-10-CM

## 2018-10-29 DIAGNOSIS — G44.309 POST-CONCUSSION HEADACHE: ICD-10-CM

## 2018-10-29 DIAGNOSIS — Z71.89 HEAD INJURY CONSULTATION: ICD-10-CM

## 2018-10-29 PROCEDURE — 97535 SELF CARE MNGMENT TRAINING: CPT

## 2018-10-29 PROCEDURE — 97014 ELECTRIC STIMULATION THERAPY: CPT | Performed by: PHYSICAL THERAPIST

## 2018-10-29 PROCEDURE — 97112 NEUROMUSCULAR REEDUCATION: CPT | Performed by: PHYSICAL THERAPIST

## 2018-10-29 PROCEDURE — 97140 MANUAL THERAPY 1/> REGIONS: CPT | Performed by: PHYSICAL THERAPIST

## 2018-10-29 PROCEDURE — 92507 TX SP LANG VOICE COMM INDIV: CPT | Performed by: SPEECH-LANGUAGE PATHOLOGIST

## 2018-10-29 PROCEDURE — 97110 THERAPEUTIC EXERCISES: CPT | Performed by: PHYSICAL THERAPIST

## 2018-10-29 NOTE — PROGRESS NOTES
Daily Note     Today's date: 10/29/2018  Patient name: Derrick Richter  : 1965  MRN: [id number]  Referring provider: Sandy Kathleen PA-C  Dx:   Encounter Diagnosis     ICD-10-CM    1  Post-concussion headache G44 309          Subjective: Reports having a 4/10 HA today due to eye exercises from her OT session  Objective: See treatment diary below    Precautions: n/a    Daily Treatment Diary     Manual  10/29 10/15 10/18 10/22 10/25   STM        SOR in supine 3 min  x3min x3min x3min x3min   Gentle cervical distraction in supine 3 min x3min x3min x3min x3min   Contract/Relax cervical rotation in supine    To L  x5 reps  6sholds/stretch To L  x5 reps  6sholds/stretch               Exercise Diary  10/29  10/18 10/22 10/25   UT stretch        LS stretch        Chin tucks        TB extension        TB rows        TB ER        saccades   Standing busy (bubbles)  2x45s ea H/V Standing busy (bubbles)  2x45s ea H/V Standing busy (bubbles)  2x45s ea H/V   amb w/ HT/HN Backwards, 2 laps in rosenberg each     In rosenberg  2x40' ea  BWD  2x40' ea   amb w/ 360* turns   In rosenberg  2x40' EO  2x40' EC     3 finger chin tuck rotation        Foam HTs, H/V FT EC  2x30s ea  FT EC  2x30s ea FT EC  2x30s ea FT EC  2x30s ea   VOR x 1  Standing busy (bubbles)  2x45s ea H/V  Standing busy (bubbles)  2x45s ea H/V Standing busy (bubbles)  2x45s ea H/V Standing busy (bubbles)  2x45s ea H/V   VORcx, H/V standing Walking in rosenberg  1x40' ea  Standing busy (bubbles)  2x45s ea H/V Standing busy (bubbles)  2x45s ea H/V Walking in rosenberg  2x40' ea   Foam FT EC semitandem  EC foam  3x30s  semitandem  EC foam  3x30s semitandem  EC foam  4x30s semitandem  EC foam  4x30s   Tandem gait In rosenberg 2 laps, EC  In rosenberg EC  2x40' // bars  EC 2 laps In rosenberg  4x30'   Ball toss hand to hand        Bike Recumbent L1, fat burn mode, 10min  Recumbent L1 x10 min post-c/o fogginess Recumbent L1 x10 min  (on fat burn mode) NP                               Modalities 10/29 10/15 10/18 10/22 10/25   MHP/TENS neck In supine  x10 min In supine  x10 min In supine  x10 min In supine  x10 min In supine  x10 min                       Assessment:  Pt  Reported her HA moving from top of the head back to her R temple, but HA pain only increased to 5/10 through out session  Required rest breaks due to dizziness with head turning exercises  Responded well to manual therpey and TENS reporting reduced HA pain to 0/10 and reduced neck tension  Would benefit from continued PT  Plan: Continue POC, progress as tolerated
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
## 2018-10-31 ENCOUNTER — OFFICE VISIT (OUTPATIENT)
Dept: NEUROLOGY | [place] CLINIC | Age: 53
End: 2018-10-31
Payer: COMMERCIAL

## 2018-10-31 ENCOUNTER — TELEPHONE (OUTPATIENT)
Dept: NEUROLOGY | [place] CLINIC | Age: 53
End: 2018-10-31

## 2018-10-31 VITALS
WEIGHT: 189.8 LBS | HEART RATE: 80 BPM | DIASTOLIC BLOOD PRESSURE: 72 MMHG | BODY MASS INDEX: 31.62 KG/M2 | SYSTOLIC BLOOD PRESSURE: 118 MMHG | HEIGHT: 65 IN

## 2018-10-31 DIAGNOSIS — G44.309 POST-CONCUSSION HEADACHE: ICD-10-CM

## 2018-10-31 DIAGNOSIS — Z71.89 HEAD INJURY CONSULTATION: ICD-10-CM

## 2018-10-31 DIAGNOSIS — T74.91XD DOMESTIC VIOLENCE OF ADULT, SUBSEQUENT ENCOUNTER: ICD-10-CM

## 2018-10-31 DIAGNOSIS — G31.84 MILD COGNITIVE IMPAIRMENT: Primary | ICD-10-CM

## 2018-10-31 PROCEDURE — 99215 OFFICE O/P EST HI 40 MIN: CPT | Performed by: PHYSICIAN ASSISTANT

## 2018-10-31 RX ORDER — SUCRALFATE 1 G/1
1 TABLET ORAL 2 TIMES DAILY PRN
Qty: 15 TABLET | Refills: 0 | Status: ON HOLD | OUTPATIENT
Start: 2018-10-31 | End: 2018-12-20

## 2018-10-31 RX ORDER — GABAPENTIN 300 MG/1
300 CAPSULE ORAL 3 TIMES DAILY
Qty: 90 CAPSULE | Refills: 0 | Status: SHIPPED | OUTPATIENT
Start: 2018-10-31 | End: 2019-01-29

## 2018-10-31 RX ORDER — INDOMETHACIN 25 MG/1
25 CAPSULE ORAL 2 TIMES DAILY WITH MEALS
Qty: 15 CAPSULE | Refills: 0 | Status: ON HOLD | OUTPATIENT
Start: 2018-10-31 | End: 2018-12-20

## 2018-10-31 RX ORDER — AMITRIPTYLINE HYDROCHLORIDE 10 MG/1
10 TABLET, FILM COATED ORAL
Qty: 90 TABLET | Refills: 3 | Status: ON HOLD | OUTPATIENT
Start: 2018-10-31 | End: 2018-12-20

## 2018-10-31 RX ORDER — THIAMINE HCL 100 MG
TABLET ORAL
Status: ON HOLD | COMMUNITY
End: 2018-12-20
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
## 2018-10-31 NOTE — PATIENT INSTRUCTIONS
Preventative:  Wean off nortriptyline by taking 1 capsule at bedtime for 7 nights then stop  Start amitriptyline 1 capsule at bedtime for 10 days, 2 capsules for 10 days and finally 3 capsules at bedtime  Start gabapentin 1 capsule at bedtime for 3 days then morning and bedtime for 3 days and finally 3 times a day  If your daytime dose makes you to tired take all the capsules at bedtime    Abortive:   at onset of headache take prochlorperazine  This may be repeated in 6 hr if needed  This has a limit of 10 a month  You may also try indomethacin every 8 hr as needed but this has a limit of 15 pills a month  Do not take this on an empty stomach  If you have had food you may take it, otherwise take sucralfate 20 min prior to the medication     please continue with your psychology appointment    We will work on getting a psychiatrist for you    Please continue with OT/PT/speech  Please do your fitness to drive testing    We will have you see our concussion specialist at the next visit

## 2018-10-31 NOTE — TELEPHONE ENCOUNTER
Writer met with patient during appt  Will look into psychiatry services for patient  She reported she has an appt  With [facility] in end of November  Will look into this  Will contact patient with options

## 2018-10-31 NOTE — PROGRESS NOTES
Daily Speech Treatment Note    Today's date: 2018  Patients name: Renetta Trimble  : 1965  MRN: [id number]  Safety measures: Head injury; fall risk  Referring provider: Rock Smyth, *    Primary Diagnosis/Billing code: R48 8  Secondary Diagnosis/ Billing code: Z71 89, [id number]     Visit Tracking:  -Referring provider: Epic   -Billing guidelines: AMA  -Visit #14   -[employer]  (Ria Christianson)  -2018  Subjective/Behavioral:  -Patient reported that she had a HA upon arrival to  (pain: 3/10)  Objective/Assessment:  -Patient lost HEP worksheet since last appointment  Short-term goals:  3  Patient will name an average of 10+ words that begin with a specific letter in 60 seconds over 5 trials using compensatory strategies to facilitate improved word retrieval skills (to be achieved in 4-6 weeks)  PARTIALLY MET     4  Patient will complete word generation tasks (e g , synonyms/antonyms, analogies, category matrices, etc ) with 80% accuracy using word finding strategies to facilitate improved word retrieval skills (to be achieved in 4-6 weeks)  PARTIALLY MET     5  Patient will complete auditory immediate and short term memory tasks to 80% accuracy to facilitate increased ability to retell narratives and recall information within functional living environment (to be achieved in 4-6 weeks)  PARTIALLY MET    6  Patient will complete thought organization tasks (e g , sequencing, deduction puzzles, etc ) with 80% accuracy to facilitate increased executive functioning skills (to be achieved in 4-6 weeks)  PARTIALLY MET  -Reasoning/working memory/thought organization (anagrams): Patient was presented with a target word and asked to rearrange letters to create another word (e g , late = TALE ; item = TIME)  Initially, no visual cue was provided, and patient was not permitted to write word on paper   Task completed in  opp (67% acc), increasing to  opp (100% acc) with the utilization of written support and min phonemic cues from clinician     -Convergent reasoning: Patient was presented with a list of 12 symbols and asked to determine which symbol could be used to complete 12 words (e g , %age for PERCENTAGE // =ity for EQUALITY)  Task completed in 2/12 opp (17%) independently, increasing to 12/12 opp (100% acc) with min-mod verbal/visual cues  Plan:  -Patient was provided with home exercises/activities to target goals in plan of care at the end of today's session   -Continue with current plan of care

## 2018-10-31 NOTE — ASSESSMENT & PLAN NOTE
Preventative:  Wean off nortriptyline by taking 1 capsule at bedtime for 7 nights then stop  Start amitriptyline 1 capsule at bedtime for 10 days, 2 capsules for 10 days and finally 3 capsules at bedtime  Start gabapentin 1 capsule at bedtime for 3 days then morning and bedtime for 3 days and finally 3 times a day  If your daytime dose makes you to tired take all the capsules at bedtime    Abortive:   at onset of headache take prochlorperazine  This may be repeated in 6 hr if needed  This has a limit of 10 a month  You may also try indomethacin every 8 hr as needed but this has a limit of 15 pills a month  Do not take this on an empty stomach  If you have had food you may take it, otherwise take sucralfate 20 min prior to the medication     please continue with your psychology appointment    We will work on getting a psychiatrist for you    Please continue with OT/PT/speech  Please do your fitness to drive testing

## 2018-11-01 ENCOUNTER — OFFICE VISIT (OUTPATIENT)
Dept: SPEECH THERAPY | [place] CLINIC | Age: 53
End: 2018-11-01
Payer: COMMERCIAL

## 2018-11-01 ENCOUNTER — OFFICE VISIT (OUTPATIENT)
Dept: OCCUPATIONAL THERAPY | [place] CLINIC | Age: 53
End: 2018-11-01
Payer: COMMERCIAL

## 2018-11-01 ENCOUNTER — OFFICE VISIT (OUTPATIENT)
Dept: PHYSICAL THERAPY | [place] CLINIC | Age: 53
End: 2018-11-01
Payer: COMMERCIAL

## 2018-11-01 DIAGNOSIS — Z71.89 HEAD INJURY CONSULTATION: ICD-10-CM

## 2018-11-01 DIAGNOSIS — G44.309 POST-CONCUSSION HEADACHE: Primary | ICD-10-CM

## 2018-11-01 DIAGNOSIS — G44.309 POST-CONCUSSION HEADACHE: ICD-10-CM

## 2018-11-01 DIAGNOSIS — R48.8 OTHER SYMBOLIC DYSFUNCTIONS: Primary | ICD-10-CM

## 2018-11-01 PROCEDURE — 97150 GROUP THERAPEUTIC PROCEDURES: CPT | Performed by: PHYSICAL THERAPIST

## 2018-11-01 PROCEDURE — 97112 NEUROMUSCULAR REEDUCATION: CPT | Performed by: PHYSICAL THERAPIST

## 2018-11-01 PROCEDURE — 97535 SELF CARE MNGMENT TRAINING: CPT

## 2018-11-01 PROCEDURE — 92507 TX SP LANG VOICE COMM INDIV: CPT | Performed by: SPEECH-LANGUAGE PATHOLOGIST

## 2018-11-01 NOTE — PROGRESS NOTES
Daily Note     Today's date: 2018  Patient name: Stephen Pollard  : 1965  MRN: [id number]  Referring provider: Lucy Carmona PA-C  Dx:   Encounter Diagnosis     ICD-10-CM    1  Post-concussion headache G44 309    2  Head injury consultation Z71 89          Subjective: HA 4-5/10 upon arrival   No dizziness  Very anxious about having to take a 's test   Arrived 10 min late  Objective: See treatment diary below  7330-6134 group  2232-5377 1:1  0945-10 unsupervised  Precautions: n/a    Daily Treatment Diary     Manual  10/29       STM        SOR in supine 3 min        Gentle cervical distraction in supine 3 min       Contract/Relax cervical rotation in supine                    Exercise Diary  10/29 11/1      UT stretch        LS stretch        Chin tucks        TB extension        TB rows        TB ER        saccades  With gait  2x30s ea      amb w/ HT/HN Backwards, 2 laps in rosenberg each  amb w/ 360* turns  In rosenberg  4x40'      3 finger chin tuck rotation        Foam HTs, H/V FT EC  2x30s ea FT EC  2x30s ea      VOR x 1  Standing busy (bubbles)  2x45s ea H/V Walking to/from target  2x30s ea      VORcx, H/V standing Walking in rosenberg  1x40' ea Walking  2x30' ea      Foam FT EC semitandem  EC foam  3x30s semitandem  EC foam  3x30s      Tandem gait In rosenberg 2 laps, EC In rosenberg 2 laps, EC      Ball toss hand to hand  In rosenberg  2x40'      Bike Recumbent L1, fat burn mode, 10min Upright L4  x10 min      Rockerboard M/L and A/P  EO arms crossed  x1min ea                          Modalities 10/29       MHP/TENS neck In supine  x10 min                           Assessment:  Neck deferred today to attempt to focus more on concussion exercises  Pt tolerated session with minimal complaints of HA, some dizziness with 360 deg turns  Pt very distracted with irritation of her doctor's visit yest   Discussed with SLP to review her appts with her and straighten everything out      Plan: Address neck NV as pt needed

## 2018-11-02 NOTE — PROGRESS NOTES
Daily Speech Treatment Note    Today's date: 2018  Patients name: Kimberly Betancourt  : 1965  MRN: [id number]  Safety measures: Head injury; fall risk  Referring provider: Israel Smith, *    Primary Diagnosis/Billing code: R48 8  Secondary Diagnosis/ Billing code: Z71 89, B39 668     Visit Tracking:  -Referring provider: Epic   -Billing guidelines: AMA  -Visit #15   -[employer]  (Jeffrey Anguiano)  -2018  Subjective/Behavioral:  -"She [OT student] gave me a good workout " (HA: 3/10)    Objective/Assessment:  -Patient did not complete HEP worksheets since last appointment  Short-term goals:  3  Patient will name an average of 10+ words that begin with a specific letter in 60 seconds over 5 trials using compensatory strategies to facilitate improved word retrieval skills (to be achieved in 4-6 weeks)  PARTIALLY MET     4  Patient will complete word generation tasks (e g , synonyms/antonyms, analogies, category matrices, etc ) with 80% accuracy using word finding strategies to facilitate improved word retrieval skills (to be achieved in 4-6 weeks)  PARTIALLY MET   -Word finding: Patient played [employer] card game to target speed of naming/processing based upon category labels (e g , flower, pop band, candy bar)  Level 5 cards utilized  Patient named an average of 7 67 cards/min (norm 10+)  Clinician educated patient on strategies to assist with thought organization and word retrieval speed, as well as circumlocution  Reviewed 9 skipped cards at end of trials  5  Patient will complete auditory immediate and short term memory tasks to 80% accuracy to facilitate increased ability to retell narratives and recall information within functional living environment (to be achieved in 4-6 weeks)  PARTIALLY MET    6  Patient will complete thought organization tasks (e g , sequencing, deduction puzzles, etc ) with 80% accuracy to facilitate increased executive functioning skills (to be achieved in 4-6 weeks)  PARTIALLY MET  -Reasoning/working memory/thought organization (anagrams): Patient was presented with a target word and asked to rearrange letters to create another word (e g , late = TALE ; item = TIME)  Initially, no visual cue was provided, and patient was not permitted to write word on paper  Task completed in 20/30 opp (67% acc), increasing to 23/30 opp (77% acc) with the utilization of written support, increasing to 29/30 opp (97% acc) with min phonemic cues from clinician  Plan:  -Patient was provided with home exercises/activities to target goals in plan of care at the end of today's session   -Continue with current plan of care

## 2018-11-05 ENCOUNTER — OFFICE VISIT (OUTPATIENT)
Dept: SPEECH THERAPY | [place] CLINIC | Age: 53
End: 2018-11-05
Payer: COMMERCIAL

## 2018-11-05 ENCOUNTER — OFFICE VISIT (OUTPATIENT)
Dept: OCCUPATIONAL THERAPY | [place] CLINIC | Age: 53
End: 2018-11-05
Payer: COMMERCIAL

## 2018-11-05 DIAGNOSIS — R48.8 OTHER SYMBOLIC DYSFUNCTIONS: Primary | ICD-10-CM

## 2018-11-05 DIAGNOSIS — G44.309 POST-CONCUSSION HEADACHE: Primary | ICD-10-CM

## 2018-11-05 DIAGNOSIS — Z71.89 HEAD INJURY CONSULTATION: ICD-10-CM

## 2018-11-05 DIAGNOSIS — G44.309 POST-CONCUSSION HEADACHE: ICD-10-CM

## 2018-11-05 PROCEDURE — 97535 SELF CARE MNGMENT TRAINING: CPT

## 2018-11-05 PROCEDURE — 92507 TX SP LANG VOICE COMM INDIV: CPT | Performed by: SPEECH-LANGUAGE PATHOLOGIST

## 2018-11-05 NOTE — TELEPHONE ENCOUNTER
Form completed and signed  Faxed to [facility] and on my desk  LMOM for pt to return call to make her aware of Marta's recommendations

## 2018-11-05 NOTE — TELEPHONE ENCOUNTER
Jerry Ramírez, please see 10/04 encounter  Dr Griselda Patel filled out original form stating pt cannot return to work until cleared by PT, OT , vision, and cognitive therapy  Pt now requires updated form as her leave was approved until 10/31  Please advise on return to work status  Next neuro appt 12/13/18
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
## 2018-11-05 NOTE — TELEPHONE ENCOUNTER
Writer called over to [address] to verify that patient has an appt  With them  Patient does have a therapy appt scheduled for November 27th  They are currently scheduling into September 2019 for Psychiatry services

## 2018-11-05 NOTE — TELEPHONE ENCOUNTER
Writer attempted to call patient  Left voicemail asking for return call to discuss in network psychiatrist  Patient has a lot of options  Writer would like to know if patient wants them emailed list quicker or just mailed to her for her review to pick and schedule  Awaiting return call

## 2018-11-06 NOTE — TELEPHONE ENCOUNTER
Clinical team:  If patient is not going to go to psychiatry or follow my recommendations, then she should return to work  If she wants FMLA then therapy can write for her since they feel she doesn't need psychiatry  I will not write for anymore  Please let her know
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
## 2018-11-07 NOTE — TELEPHONE ENCOUNTER
I spoke with pt and explained the importance of seeing psychiatry and that we cannot complete paperwork as this recommendation is part of her tx plan  I explained that cognitive/memory issues can be associated with trauma/PTSD and require evaluation/tx from psychiatry  Also advised her to keep appt with psychology as therapy is important as well  Pt agreeable  States she will wait for list mailed by Catalino Gonzalez to contact psychiatrists  Pt then stated she needs STD form to be returned by 11/17 or she will lose her job/insurance  I advised pt to contact insurance for providers and call us with appt info so we can confirm

## 2018-11-08 ENCOUNTER — OFFICE VISIT (OUTPATIENT)
Dept: PHYSICAL THERAPY | [place] CLINIC | Age: 53
End: 2018-11-08
Payer: COMMERCIAL

## 2018-11-08 ENCOUNTER — OFFICE VISIT (OUTPATIENT)
Dept: SPEECH THERAPY | [place] CLINIC | Age: 53
End: 2018-11-08
Payer: COMMERCIAL

## 2018-11-08 ENCOUNTER — OFFICE VISIT (OUTPATIENT)
Dept: OCCUPATIONAL THERAPY | [place] CLINIC | Age: 53
End: 2018-11-08
Payer: COMMERCIAL

## 2018-11-08 DIAGNOSIS — G44.309 POST-CONCUSSION HEADACHE: Primary | ICD-10-CM

## 2018-11-08 DIAGNOSIS — R48.8 OTHER SYMBOLIC DYSFUNCTIONS: Primary | ICD-10-CM

## 2018-11-08 DIAGNOSIS — Z71.89 HEAD INJURY CONSULTATION: ICD-10-CM

## 2018-11-08 DIAGNOSIS — G31.84 MILD COGNITIVE IMPAIRMENT: Primary | ICD-10-CM

## 2018-11-08 DIAGNOSIS — G44.309 POST-CONCUSSION HEADACHE: ICD-10-CM

## 2018-11-08 PROCEDURE — G9175 SPEECH LANG GOAL STATUS: HCPCS | Performed by: SPEECH-LANGUAGE PATHOLOGIST

## 2018-11-08 PROCEDURE — 97112 NEUROMUSCULAR REEDUCATION: CPT | Performed by: PHYSICAL THERAPIST

## 2018-11-08 PROCEDURE — G9176 SPEECH LANG D/C STATUS: HCPCS | Performed by: SPEECH-LANGUAGE PATHOLOGIST

## 2018-11-08 PROCEDURE — 97530 THERAPEUTIC ACTIVITIES: CPT

## 2018-11-08 PROCEDURE — 92507 TX SP LANG VOICE COMM INDIV: CPT

## 2018-11-08 NOTE — TELEPHONE ENCOUNTER
Pt came into office requesting letter to return to work full time (40hr/wk) w/o restrictions  States she spoke to insurance and [employer]  will be setting her up with a psychiatrist  Pt states she feels she is doing well with PT and will be ready to return 11/19  Does not want to lose her job/insurance as she finally has her own apartment  Pt mentioned she is still having trouble sleeping despite taking Elavil 20mg hs  Gets 1 hr sleep per night  Feels that once she returns to work, she will be physically tired enough to sleep at least 6-7 hr per night  I stressed again the importance of seeing psychiatry for possible meds to assist with stress/anxiety, etc  She is questioning if dose can be increased or alt med can be rx'd to assist with sleep  Melatonin was discussed with pt  She will look into it  Pt will continue with plan of care (PT, OT, counseling, psychiatry, etc)   Pt will call us once she has psych appt  Please advise on letter and med  Pt can  letter at [place] [facility] office  Does not need it hand signed

## 2018-11-08 NOTE — PROGRESS NOTES
Daily Speech Treatment Note    Today's date: 2018  Patients name: Sulaiman Erickson  : 1965  MRN: [id number]  Safety measures: Head injury; fall risk  Referring provider: Brant Arriaza, *    Primary Diagnosis/Billing code: R48 8  Secondary Diagnosis/ Billing code: Z71 89, E74 338     Visit Tracking:  -Referring provider: Epic   -Billing guidelines: AMA  -Visit #16   -[employer]  (Tasha Liu)  -2018  Subjective/Behavioral:  -"Y'all do a good job here "    Objective/Assessment:  -Reviewed patient's home exercises/activities completed since last appointment  (Numbers and general information) task completed in 11/15 opp (73%acc) independnetly, Increased success given min verbal cues  Short-term goals:  3  Patient will name an average of 10+ words that begin with a specific letter in 60 seconds over 5 trials using compensatory strategies to facilitate improved word retrieval skills (to be achieved in 4-6 weeks)  PARTIALLY MET     4  Patient will complete word generation tasks (e g , synonyms/antonyms, analogies, category matrices, etc ) with 80% accuracy using word finding strategies to facilitate improved word retrieval skills (to be achieved in 4-6 weeks)  PARTIALLY MET    To target word generation pt presented with category matrices in which she was provided with a category and an initial letter of a member in the category and asked to write the category member  Pt was able to independently write category member is 29/32 opps (92%acc), increased to 32/32 opps given min verbal prompts  Pt completed task in 7:59   -Pt was instructed to provide a synonym and antonym for written word  Task completed in  opp (81%acc) increased success to  given min verbal cues  5  Patient will complete auditory immediate and short term memory tasks to 80% accuracy to facilitate increased ability to retell narratives and recall information within functional living environment (to be achieved in 4-6 weeks)  PARTIALLY MET  To target memory and mental manipulation, clinician read 4 words aloud and asked pt to repeat the words in reverse order  Task complete din 7/10 opp (70%acc) increased success when given repetition of words  Word List retention: Clinician read 4 words aloud and asked patient a questions re words (i e , talk, brown, shoe, horse: what was the third word?) pt was asked to code the words as clinician read them aloud  Task completed in 7 5/10 opp (75%acc) increased success given repetition of words  6  Patient will complete thought organization tasks (e g , sequencing, deduction puzzles, etc ) with 80% accuracy to facilitate increased executive functioning skills (to be achieved in 4-6 weeks)  PARTIALLY MET    -Pt was instructed to complete deduction puzzle (2x5) task completed with 70% acc independently, increased success to 100% acc given max verbal cues from clinician  Pt then complete deduction puzzle (3x4) she required max assistance from clinician to complete task  Clinician provided step by step solution for patient, she expressed she was able to understand it better after clinician provided explanation  Plan:  -Patient was provided with home exercises/activities to target goals in plan of care at the end of today's session   -Continue with current plan of care

## 2018-11-08 NOTE — PROGRESS NOTES
Daily Note     Today's date: 2018  Patient name: Elif Hui  : 1965  MRN: [id number]  Referring provider: Prudence Ferraro PA-C  Dx:   Encounter Diagnosis     ICD-10-CM    1  Post-concussion headache G44 309    2  Head injury consultation Z71 89          Subjective: 30 min late  Still very anxious about possibly having to do a FTD  Has been going to gym  No significant complaints upon arrival   Very vague about her HA  Objective: See treatment diary below    Precautions: n/a    Daily Treatment Diary     Manual  10/29       STM        SOR in supine 3 min        Gentle cervical distraction in supine 3 min       Contract/Relax cervical rotation in supine                    Exercise Diary  10/29 11/1 11/8     UT stretch        LS stretch        Chin tucks        TB extension        TB rows        TB ER        saccades  With gait  2x30s ea With gait  2x45s ea     amb w/ HT/HN Backwards, 2 laps in rosenberg each  amb w/ 360* turns  In rosenberg  4x40'      3 finger chin tuck rotation        Foam HTs, H/V FT EC  2x30s ea FT EC  2x30s ea      VOR x 1  Standing busy (bubbles)  2x45s ea H/V Walking to/from target  2x30s ea With gait  2x45s ea     VORcx, H/V standing Walking in rosenberg  1x40' ea Walking  2x30' ea Walking  2x40' ea     Foam FT EC semitandem  EC foam  3x30s semitandem  EC foam  3x30s      Tandem gait In rosenberg 2 laps, EC In rosenberg 2 laps, EC      Ball toss hand to hand  In rosenberg  2x40'      Bike Recumbent L1, fat burn mode, 10min Upright L4  x10 min      Rockerboard M/L and A/P  EO arms crossed  x1min ea                          Modalities 10/29       MHP/TENS neck In supine  x10 min                           Assessment:  Session limited due to pt's anxiety with her doctor's appts and wanting to be done with therapy due to thinking if she's not here they can't make her do a FTD    Discussed with pt a great length that there is no script currently for a FTD and it's at the doctor's disgretion if she has to do it  Discussed pt's good progress with PT and potential soon d/c  Discussed adding more lifting exercises NV due to her having increased HA still with this and needs it for RTW  Plan: Add lifting exercises NV as pt able to address RTW duties
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
## 2018-11-09 NOTE — TELEPHONE ENCOUNTER
I did discuss with her to increase to 3 tabs at bedtime  If cleared from PT and OT can return to work    Still needs FTD despite returning to work etc

## 2018-11-09 NOTE — TELEPHONE ENCOUNTER
Pt made aware and verbalized understanding  Pt will be increasing elavil to 30 mg hs  Has PT/OT Monday and will discuss progress/clearance as well as FTD

## 2018-11-11 NOTE — PROGRESS NOTES
Daily Speech Treatment Note    Today's date: 2018 (***update)  Patients name: Russell Birch  : 1965  MRN: [id number]  Safety measures: Head injury; fall risk  Referring provider: Haily May, *    Primary Diagnosis/Billing code: R48 8  Secondary Diagnosis/ Billing code: Z71 89, P60 782     Visit Tracking:  -Referring provider: Epic   -Billing guidelines: AMA  -Visit #16  (***update)  -Soumya  (Baylee Yap)  -2018  Subjective/Behavioral:  -***    Objective/Assessment:  -Reviewed patient's home exercises/activities completed since last appointment  (Numbers and general information) task completed in 11/15 opp (73%acc) independnetly, Increased success given min verbal cues  Short-term goals:  3  Patient will name an average of 10+ words that begin with a specific letter in 60 seconds over 5 trials using compensatory strategies to facilitate improved word retrieval skills (to be achieved in 4-6 weeks)  PARTIALLY MET     4  Patient will complete word generation tasks (e g , synonyms/antonyms, analogies, category matrices, etc ) with 80% accuracy using word finding strategies to facilitate improved word retrieval skills (to be achieved in 4-6 weeks)  PARTIALLY MET    5  Patient will complete auditory immediate and short term memory tasks to 80% accuracy to facilitate increased ability to retell narratives and recall information within functional living environment (to be achieved in 4-6 weeks)  PARTIALLY MET    6  Patient will complete thought organization tasks (e g , sequencing, deduction puzzles, etc ) with 80% accuracy to facilitate increased executive functioning skills (to be achieved in 4-6 weeks)  PARTIALLY MET    Plan:  -Patient was provided with home exercises/activities to target goals in plan of care at the end of today's session   -Continue with current plan of care

## 2018-11-12 ENCOUNTER — APPOINTMENT (OUTPATIENT)
Dept: SPEECH THERAPY | [place] CLINIC | Age: 53
End: 2018-11-12
Payer: COMMERCIAL

## 2018-11-12 ENCOUNTER — APPOINTMENT (OUTPATIENT)
Dept: OCCUPATIONAL THERAPY | [place] CLINIC | Age: 53
End: 2018-11-12
Payer: COMMERCIAL

## 2018-11-12 ENCOUNTER — APPOINTMENT (OUTPATIENT)
Dept: PHYSICAL THERAPY | [place] CLINIC | Age: 53
End: 2018-11-12
Payer: COMMERCIAL
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
## 2018-11-14 NOTE — PROGRESS NOTES
Daily Speech Treatment Note    Today's date: 2018 (***update)  Patients name: Wilfredo Oneal  : 1965  MRN: [id number]  Safety measures: Head injury; fall risk  Referring provider: Ting Donato, *    Primary Diagnosis/Billing code: R48 8  Secondary Diagnosis/ Billing code: Z71 89, C46 347     Visit Tracking:  -Referring provider: Epic   -Billing guidelines: AMA  -Visit #16  (***update)  -Soumya  (Meaghan Kraig)  -2018  Subjective/Behavioral:  -***    Objective/Assessment:  -Reviewed patient's home exercises/activities completed since last appointment  (Numbers and general information) task completed in 11/15 opp (73%acc) independnetly, Increased success given min verbal cues  Short-term goals:  3  Patient will name an average of 10+ words that begin with a specific letter in 60 seconds over 5 trials using compensatory strategies to facilitate improved word retrieval skills (to be achieved in 4-6 weeks)  PARTIALLY MET     4  Patient will complete word generation tasks (e g , synonyms/antonyms, analogies, category matrices, etc ) with 80% accuracy using word finding strategies to facilitate improved word retrieval skills (to be achieved in 4-6 weeks)  PARTIALLY MET    5  Patient will complete auditory immediate and short term memory tasks to 80% accuracy to facilitate increased ability to retell narratives and recall information within functional living environment (to be achieved in 4-6 weeks)  PARTIALLY MET    6  Patient will complete thought organization tasks (e g , sequencing, deduction puzzles, etc ) with 80% accuracy to facilitate increased executive functioning skills (to be achieved in 4-6 weeks)  PARTIALLY MET    Plan:  -Patient was provided with home exercises/activities to target goals in plan of care at the end of today's session   -Continue with current plan of care

## 2018-11-15 ENCOUNTER — APPOINTMENT (OUTPATIENT)
Dept: OCCUPATIONAL THERAPY | [place] CLINIC | Age: 53
End: 2018-11-15
Payer: COMMERCIAL

## 2018-11-15 ENCOUNTER — APPOINTMENT (OUTPATIENT)
Dept: SPEECH THERAPY | [place] CLINIC | Age: 53
End: 2018-11-15
Payer: COMMERCIAL

## 2018-11-15 ENCOUNTER — APPOINTMENT (OUTPATIENT)
Dept: PHYSICAL THERAPY | [place] CLINIC | Age: 53
End: 2018-11-15
Payer: COMMERCIAL

## 2018-11-16 ENCOUNTER — TELEPHONE (OUTPATIENT)
Dept: NEUROLOGY | [place] CLINIC | Age: 53
End: 2018-11-16

## 2018-11-16 NOTE — TELEPHONE ENCOUNTER
Please see 10/25 & 10/31 encounters for more information    Pt came into office stating she received a message from someone in our office that her return to work letter is ready  Per 10/25 encounter, Cristobal Bates stated on 11/9 that she will not be providing letter until pt cleared by PT and OT  Pt was to get back to us re clearance after seeing PT/OT on 11/12 however no call from pt  Per Laura Canela, pt stating she was cleared  However pt continues w/ OT and ST appts  I spoke with PT Frida Castro who stated pt should continue PT but probably can return to work however return to work status best addressed by treating PT/OT  I will also send this encounter to PT Aarti Munoz and OT Мария Pascual for their input

## 2018-11-16 NOTE — TELEPHONE ENCOUNTER
Nina Lomax and Canon yung, could please review previous encounters and advise if you feel pt can return to work at this time? Any insight is greatly appreciated  We can also be reached directly at [phone]  Thank you

## 2018-11-19 ENCOUNTER — APPOINTMENT (OUTPATIENT)
Dept: OCCUPATIONAL THERAPY | [place] CLINIC | Age: 53
End: 2018-11-19
Payer: COMMERCIAL

## 2018-11-19 ENCOUNTER — TELEPHONE (OUTPATIENT)
Dept: SPEECH THERAPY | [place] CLINIC | Age: 53
End: 2018-11-19

## 2018-11-19 ENCOUNTER — APPOINTMENT (OUTPATIENT)
Dept: PHYSICAL THERAPY | [place] CLINIC | Age: 53
End: 2018-11-19
Payer: COMMERCIAL

## 2018-11-19 ENCOUNTER — APPOINTMENT (OUTPATIENT)
Dept: SPEECH THERAPY | [place] CLINIC | Age: 53
End: 2018-11-19
Payer: COMMERCIAL

## 2018-11-19 NOTE — TELEPHONE ENCOUNTER
Patient did not show for her 5:00 ST appointment [facility]  Clinician called and left a message for patient to r/s to another time this week  Also, confirmed next weeks ST/PT/OT appointments

## 2018-11-21 ENCOUNTER — APPOINTMENT (OUTPATIENT)
Dept: PHYSICAL THERAPY | [place] CLINIC | Age: 53
End: 2018-11-21
Payer: COMMERCIAL

## 2018-11-21 ENCOUNTER — APPOINTMENT (OUTPATIENT)
Dept: OCCUPATIONAL THERAPY | [place] CLINIC | Age: 53
End: 2018-11-21
Payer: COMMERCIAL

## 2018-11-21 ENCOUNTER — APPOINTMENT (OUTPATIENT)
Dept: SPEECH THERAPY | [place] CLINIC | Age: 53
End: 2018-11-21
Payer: COMMERCIAL

## 2018-11-26 ENCOUNTER — TELEPHONE (OUTPATIENT)
Dept: SPEECH THERAPY | [place] CLINIC | Age: 53
End: 2018-11-26

## 2018-11-26 ENCOUNTER — APPOINTMENT (OUTPATIENT)
Dept: PHYSICAL THERAPY | [place] CLINIC | Age: 53
End: 2018-11-26
Payer: COMMERCIAL

## 2018-11-26 ENCOUNTER — APPOINTMENT (OUTPATIENT)
Dept: OCCUPATIONAL THERAPY | [place] CLINIC | Age: 53
End: 2018-11-26
Payer: COMMERCIAL

## 2018-11-26 ENCOUNTER — APPOINTMENT (OUTPATIENT)
Dept: SPEECH THERAPY | [place] CLINIC | Age: 53
End: 2018-11-26
Payer: COMMERCIAL

## 2018-11-26 NOTE — TELEPHONE ENCOUNTER
Patient did not show for her 4:00 ST appointment [facility]  Clinician attempted to reach patient again via telephone; however, patient did not answer  Clinician left a message explaining that patient would be discharged from outpatient skilled Speech Therapy services secondary to non-compliance with attendance policy  Patient has had a total of 2 cancellations and 2 "no-shows" across the past 4 scheduled Speech Therapy appointments  If patient would like to resume therapy in the future, she will need a new prescription

## 2018-11-26 NOTE — PROGRESS NOTES
Addendum:    Patient has not been seen in outpatient skilled Speech Therapy services since 11/08/2018  Patient has not attempted to contact the clinic despite telephone call messages being left by clinician  Patient did not show for her 4:00 ST appointment tonight (11/26/2018)  Clinician attempted to reach patient again via telephone; however, patient did not answer  Clinician left a message explaining that patient would be discharged from outpatient skilled Speech Therapy services secondary to non-compliance with attendance policy  Patient has had a total of 2 cancellations and 2 "no-shows" across the past 4 scheduled Speech Therapy appointments  If patient would like to resume therapy in the future, she will need a new prescription

## 2018-11-28 NOTE — TELEPHONE ENCOUNTER
Pt called back, states that her fitness to drive is scheduled 64/73 at 4pm  And that she is "done with all therapy", states they no longer have anything scheduled for her  I see a note from speech that she has no showed her appt and she was being discharged and any further appts require a new script  I sent a message to Pt/Speech to clarify what they recommend from their end regarding her return to work  Please advise

## 2018-11-28 NOTE — TELEPHONE ENCOUNTER
Pt called regarding status of letter  I made her aware we are awaiting response from PT & OT  I did remind pt to schedule FTD  Pt stated that she was waiting for someone to call her to schedule  I did call over to [facility] and spoke w/ Evi Santoro  Per Evi Santoro they have spoken to pt re FTD at which point pt stated, "well if I don't do it , then I can drive " Evi Santoro will reach out to both PT and OT

## 2018-11-29 ENCOUNTER — APPOINTMENT (OUTPATIENT)
Dept: SPEECH THERAPY | [place] CLINIC | Age: 53
End: 2018-11-29
Payer: COMMERCIAL

## 2018-11-29 ENCOUNTER — APPOINTMENT (OUTPATIENT)
Dept: PHYSICAL THERAPY | [place] CLINIC | Age: 53
End: 2018-11-29
Payer: COMMERCIAL

## 2018-11-29 ENCOUNTER — APPOINTMENT (OUTPATIENT)
Dept: OCCUPATIONAL THERAPY | [place] CLINIC | Age: 53
End: 2018-11-29
Payer: COMMERCIAL

## 2018-11-30 NOTE — TELEPHONE ENCOUNTER
Please see below message from [facility], the plan was to get clearance from PT/OT for us to write the return to work letter  Daksha Cooper Daksha Cooper Please advise how you'd like to proceed  Do you want to re order Pt/Ot? Hi All,   I am just getting caught up on Elizabeth's status  She no showed for OT/PT/SLP upwards of 5 appointments and per our attendance policy we discharged her from OT/PT/SLP and removed all scheduled appointments and she would require a script to return  She was made aware; she did warrant further treatment but did not return  Her FTD is scheduled  In terms of her clearance for work, OT/PT/SLP does not have any recent information to clear her since she has not come for therapy and it ultimately has to come from her doctor to provide a return to work letter  Let me know if you need any further assistance with her care  Thank you for the consult!    Please call if you have any questions: [phone]   [facility], OTD, OTR/L, C-GCM, CSRS   Director of Outpatient Neuro Occupational Therapy

## 2018-12-03 ENCOUNTER — APPOINTMENT (OUTPATIENT)
Dept: PHYSICAL THERAPY | [place] CLINIC | Age: 53
End: 2018-12-03
Payer: COMMERCIAL

## 2018-12-03 ENCOUNTER — APPOINTMENT (OUTPATIENT)
Dept: SPEECH THERAPY | [place] CLINIC | Age: 53
End: 2018-12-03
Payer: COMMERCIAL

## 2018-12-03 ENCOUNTER — APPOINTMENT (OUTPATIENT)
Dept: OCCUPATIONAL THERAPY | [place] CLINIC | Age: 53
End: 2018-12-03
Payer: COMMERCIAL

## 2018-12-03 NOTE — TELEPHONE ENCOUNTER
Has appointment upcoming with Dr Kayden Hoffman  Needs her FTD  But, if she wants to cancel her other appointments, then it is up to her  I believe we already wrote a letter she could return to work

## 2018-12-04 NOTE — TELEPHONE ENCOUNTER
we never wrote a letter clearing pt to return to work  it looks like we were waiting for pt to be cleared by PT/OT    Please advise

## 2018-12-04 NOTE — TELEPHONE ENCOUNTER
Patient has an appointment on 12/13/18 with Dr Amena Whitley  Since patient No showed for her pt/ot/st we will need to have Dr Amena Whitley determine if she is cleared for work

## 2018-12-05 ENCOUNTER — TELEPHONE (OUTPATIENT)
Dept: NEUROLOGY | [place] CLINIC | Age: 53
End: 2018-12-05

## 2018-12-05 NOTE — TELEPHONE ENCOUNTER
Pt called and advised pt of all of the below  She verbalized clear understanding of all instructions  Pt states that she started pt/ot/st but did not complete it bec her income had stopped coming in, "I have no money for gas"  Pt did states that pt/ot/st did help       Confirmed f/u appt w/ Dr Rebeca Panchal on 12/13/18 @ 2 pm

## 2018-12-05 NOTE — TELEPHONE ENCOUNTER
----- Message from Raffy Breen OT sent at 11/28/2018  5:27 PM EST -----  Regarding: OT/PT/SLP, FTD and Cleared to Work  Hi All,  I am just getting caught up on Elizabeth's status  She no showed for OT/PT/SLP upwards of 5 appointments and per our attendance policy we discharged her from OT/PT/SLP and removed all scheduled appointments and she would require a script to return  She was made aware; she did warrant further treatment but did not return  Her FTD is scheduled  In terms of her clearance for work, OT/PT/SLP does not have any recent information to clear her since she has not come for therapy and it ultimately has to come from her doctor to provide a return to work letter  Let me know if you need any further assistance with her care  Thank you for the consult!   Please call if you have any questions: [phone]  WILLIAM Villagomez, OTD, OTR/L, C-GCM, CSRS  Director of Outpatient Neuro Occupational Therapy

## 2018-12-06 ENCOUNTER — APPOINTMENT (OUTPATIENT)
Dept: SPEECH THERAPY | [place] CLINIC | Age: 53
End: 2018-12-06
Payer: COMMERCIAL

## 2018-12-06 ENCOUNTER — APPOINTMENT (OUTPATIENT)
Dept: OCCUPATIONAL THERAPY | [place] CLINIC | Age: 53
End: 2018-12-06
Payer: COMMERCIAL

## 2018-12-06 ENCOUNTER — APPOINTMENT (OUTPATIENT)
Dept: PHYSICAL THERAPY | [place] CLINIC | Age: 53
End: 2018-12-06
Payer: COMMERCIAL

## 2018-12-07 NOTE — PROGRESS NOTES
Pt no called/no showed to 5 appointments, scheduled for FTD, remaining appointments removed, pt d/c'd from OT caseload, will require new script if interested in returning  Thank you for the consult!   Please call if you have any questions: [phone]  WILLIAM Villagomez, OTD, OTR/L, C-GCWILL, CSRS  Director of Outpatient Neuro Occupational Therapy

## 2018-12-10 ENCOUNTER — APPOINTMENT (OUTPATIENT)
Dept: PHYSICAL THERAPY | [place] CLINIC | Age: 53
End: 2018-12-10
Payer: COMMERCIAL

## 2018-12-10 ENCOUNTER — APPOINTMENT (OUTPATIENT)
Dept: SPEECH THERAPY | [place] CLINIC | Age: 53
End: 2018-12-10
Payer: COMMERCIAL

## 2018-12-10 ENCOUNTER — APPOINTMENT (OUTPATIENT)
Dept: OCCUPATIONAL THERAPY | [place] CLINIC | Age: 53
End: 2018-12-10
Payer: COMMERCIAL

## 2018-12-12 ENCOUNTER — APPOINTMENT (OUTPATIENT)
Dept: OCCUPATIONAL THERAPY | [place] CLINIC | Age: 53
End: 2018-12-12
Payer: COMMERCIAL

## 2018-12-13 ENCOUNTER — APPOINTMENT (OUTPATIENT)
Dept: PHYSICAL THERAPY | [place] CLINIC | Age: 53
End: 2018-12-13
Payer: COMMERCIAL

## 2018-12-13 ENCOUNTER — APPOINTMENT (OUTPATIENT)
Dept: OCCUPATIONAL THERAPY | [place] CLINIC | Age: 53
End: 2018-12-13
Payer: COMMERCIAL

## 2018-12-13 ENCOUNTER — APPOINTMENT (OUTPATIENT)
Dept: SPEECH THERAPY | [place] CLINIC | Age: 53
End: 2018-12-13
Payer: COMMERCIAL

## 2018-12-17 ENCOUNTER — APPOINTMENT (OUTPATIENT)
Dept: PHYSICAL THERAPY | [place] CLINIC | Age: 53
End: 2018-12-17
Payer: COMMERCIAL

## 2018-12-17 ENCOUNTER — APPOINTMENT (OUTPATIENT)
Dept: OCCUPATIONAL THERAPY | [place] CLINIC | Age: 53
End: 2018-12-17
Payer: COMMERCIAL

## 2018-12-17 ENCOUNTER — APPOINTMENT (OUTPATIENT)
Dept: SPEECH THERAPY | [place] CLINIC | Age: 53
End: 2018-12-17
Payer: COMMERCIAL

## 2018-12-20 ENCOUNTER — ANESTHESIA (OUTPATIENT)
Dept: GASTROENTEROLOGY | [place] MEDICAL CENTER | Age: 53
End: 2018-12-20
Payer: COMMERCIAL

## 2018-12-20 ENCOUNTER — ANESTHESIA EVENT (OUTPATIENT)
Dept: GASTROENTEROLOGY | [place] MEDICAL CENTER | Age: 53
End: 2018-12-20
Payer: COMMERCIAL

## 2018-12-20 ENCOUNTER — HOSPITAL ENCOUNTER (OUTPATIENT)
[place] MEDICAL CENTER | Age: 53
Setting detail: OUTPATIENT SURGERY
Discharge: HOME | End: 2018-12-20
Attending: INTERNAL MEDICINE | Admitting: INTERNAL MEDICINE
Payer: COMMERCIAL

## 2018-12-20 VITALS
DIASTOLIC BLOOD PRESSURE: 70 MMHG | BODY MASS INDEX: 31.49 KG/M2 | WEIGHT: 189 LBS | RESPIRATION RATE: 19 BRPM | HEART RATE: 77 BPM | TEMPERATURE: 99 F | HEIGHT: 65 IN | OXYGEN SATURATION: 97 % | SYSTOLIC BLOOD PRESSURE: 110 MMHG

## 2018-12-20 DIAGNOSIS — R13.10 DYSPHAGIA, UNSPECIFIED TYPE: ICD-10-CM

## 2018-12-20 DIAGNOSIS — K21.9 GASTROESOPHAGEAL REFLUX DISEASE WITHOUT ESOPHAGITIS: ICD-10-CM

## 2018-12-20 DIAGNOSIS — K22.81 ESOPHAGEAL POLYP: ICD-10-CM

## 2018-12-20 DIAGNOSIS — R13.19 ESOPHAGEAL DYSPHAGIA: Primary | ICD-10-CM

## 2018-12-20 LAB — EXT PREGNANCY TEST URINE: NEGATIVE

## 2018-12-20 PROCEDURE — 81025 URINE PREGNANCY TEST: CPT | Performed by: ANESTHESIOLOGY

## 2018-12-20 PROCEDURE — 43249 ESOPH EGD DILATION <30 MM: CPT | Performed by: INTERNAL MEDICINE

## 2018-12-20 PROCEDURE — 43239 EGD BIOPSY SINGLE/MULTIPLE: CPT | Performed by: INTERNAL MEDICINE

## 2018-12-20 PROCEDURE — C1726 CATH, BAL DIL, NON-VASCULAR: HCPCS | Performed by: INTERNAL MEDICINE

## 2018-12-20 PROCEDURE — 88342 IMHCHEM/IMCYTCHM 1ST ANTB: CPT | Performed by: PATHOLOGY

## 2018-12-20 PROCEDURE — 88305 TISSUE EXAM BY PATHOLOGIST: CPT | Performed by: PATHOLOGY

## 2018-12-20 RX ORDER — PROPOFOL 10 MG/ML
INJECTION, EMULSION INTRAVENOUS AS NEEDED
Status: DISCONTINUED | OUTPATIENT
Start: 2018-12-20 | End: 2018-12-20 | Stop reason: SURG

## 2018-12-20 RX ORDER — SODIUM CHLORIDE 9 MG/ML
125 INJECTION, SOLUTION INTRAVENOUS CONTINUOUS
Status: DISCONTINUED | OUTPATIENT
Start: 2018-12-20 | End: 2018-12-20 | Stop reason: HOSPADM

## 2018-12-20 RX ORDER — OMEPRAZOLE 40 MG/1
40 CAPSULE, DELAYED RELEASE ORAL DAILY
Qty: 30 CAPSULE | Refills: 3 | Status: SHIPPED | OUTPATIENT
Start: 2018-12-20 | End: 2019-01-29

## 2018-12-20 RX ADMIN — PROPOFOL 50 MG: 10 INJECTION, EMULSION INTRAVENOUS at 13:59

## 2018-12-20 RX ADMIN — PROPOFOL 50 MG: 10 INJECTION, EMULSION INTRAVENOUS at 14:01

## 2018-12-20 RX ADMIN — PROPOFOL 100 MG: 10 INJECTION, EMULSION INTRAVENOUS at 13:52

## 2018-12-20 RX ADMIN — PROPOFOL 50 MG: 10 INJECTION, EMULSION INTRAVENOUS at 13:54

## 2018-12-20 RX ADMIN — PROPOFOL 50 MG: 10 INJECTION, EMULSION INTRAVENOUS at 13:56

## 2018-12-20 RX ADMIN — SODIUM CHLORIDE 125 ML/HR: 0.9 INJECTION, SOLUTION INTRAVENOUS at 13:39

## 2018-12-20 NOTE — H&P
History and Physical - [facility]  Graciela Tate 48 y o  female MRN: [id number]                  HPI: Graciela Tate is a 48y o  year old female who presents for dysphagia, GERD      REVIEW OF SYSTEMS: Per the HPI, and otherwise unremarkable  Historical Information   Past Medical History:   Diagnosis Date    Head injury     Knee injury     right knee     Past Surgical History:   Procedure Laterality Date    LAPAROSCOPY      Exploratory     Social History   History   Alcohol Use    Yes     Comment: socsial     History   Drug Use No     History   Smoking Status    Former Smoker   Smokeless Tobacco    Never Used     Comment: quit at age 22     Family History   Problem Relation Age of Onset    Diabetes Mother     Hypertension Mother     Heart disease Mother        Meds/Allergies     Prescriptions Prior to Admission   Medication    gabapentin (NEURONTIN) 300 mg capsule    acetaminophen (TYLENOL) 500 mg tablet    ranitidine (ZANTAC) 150 mg tablet       Allergies   Allergen Reactions    Penicillin V Anaphylaxis    Penicillins        Objective     Last menstrual period 11/01/2018, not currently breastfeeding  PHYSICAL EXAM    Gen: NAD  CV: RRR  CHEST: Clear  ABD: soft, NT/ND  EXT: no edema      ASSESSMENT/PLAN:  This is a 48y o  year old female here for dysphagia, GERD, and she is stable and optimized for her procedure

## 2018-12-20 NOTE — DISCHARGE INSTR - AVS FIRST PAGE
OPERATIVE REPORT  PATIENT NAME: Elif Hui    :  1965  MRN: [id number]  Pt Location: [facility] GI ROOM 01    SURGERY DATE: 2018    Surgeon(s) and Role:     * Jacqueline Ponce MD - Primary    Preop Diagnosis:  Gastroesophageal reflux disease without esophagitis [K21 9]  Dysphagia, unspecified type [R13 10]  Esophageal polyp [K22 8]    Post-Op Diagnosis Codes:     * Gastroesophageal reflux disease without esophagitis [K21 9]     * Dysphagia, unspecified type [R13 10]     * Esophageal polyp [K22 8]    Procedure(s) (LRB):  ESOPHAGOGASTRODUODENOSCOPY (EGD) (N/A)    Specimen(s):  ID Type Source Tests Collected by Time Destination   1 : duodenum biopsy r/o celiac  Tissue Duodenum TISSUE EXAM Jacqueline Ponce MD 2018 1354    2 : stomach biopsy r/o h pylori  Tissue Stomach TISSUE EXAM Jacqueline Ponce MD 2018 1355    3 : mid esophagus biopsy r/o EOE  Tissue Esophagus TISSUE EXAM Jacqueline Ponce MD 2018 1358        Estimated Blood Loss:   Minimal    ESOPHAGOGASTRODUODENOSCOPY    PROCEDURE: EGD/ Biopsy, Esophageal Dilation (TTS-Balloon)    INDICATIONS: Abdominal pain, Epigastric and Dysphagia    POST-OP DIAGNOSIS: See the impression below    SEDATION: Monitored anesthesia care, check anesthesia records    PHYSICAL EXAM:    Vitals:    18 1334   BP: 117/63   Pulse: 81   Resp: 16   Temp: 99 °F (37 2 °C)   SpO2: 99%    Body mass index is 31 45 kg/m²  General: NAD  Heart: S1 & S2 normal, RRR  Lungs: CTA, No rales or rhonchi  Abdomen: Soft, nontender, nondistended, good bowel sounds    CONSENT:  Informed consent was obtained for the procedure, including sedation after explaining the risks and benefits of the procedure  Risks including but not limited to bleeding, perforation, infection, aspiration were discussed in detail  Also explained about less than 100% sensitivity with the exam and other alternatives       PREPARATION:   EKG tracing, pulse oximetry, blood pressure were monitored throughout the procedure  Patient was identified by myself both verbally and by visual inspection of ID band  DESCRIPTION:   Patient was placed in the left lateral decubitus position and was sedated with the above medication  The gastroscope was introduced in to the oropharynx and the esophagus was intubated under direct visualization  Scope was passed down the esophagus up to 2nd part of the duodenum  A careful inspection was made as the gastroscope was withdrawn, including a retroflexed view of the stomach; findings and interventions are described below  FINDINGS:    #1  Esophagus and GEJ- normal esophagus, random forcep biopsies taken throughout the midesophagus to rule out eosinophilic esophagitis  Mild tightening at the GE junction, through the scope balloon used to dilate to 19 mm without resistance  #2  Stomach- erosive antral gastritis, cold forcep biopsies taken to rule out H pylori   hiatal hernia seen on GE junction retroflexed views  #3  Duodenum- mild erosive duodenitis of the duodenal bulb and 2nd portion of the duodenum  Biopsies taken  IMPRESSIONS:      Normal esophagus  Esophageal dilation performed at the GE junction to 19 mm  Erosive antral gastritis  Mild erosive duodenitis     RECOMMENDATIONS:     Start PPI  Avoid NSAIDs  Anti-reflux measures  Resume regular diet  Discharge home    COMPLICATIONS:  None; patient tolerated the procedure well            DISPOSITION: PACU           CONDITION: Stable    SIGNATURE: Ana María Foster MD  DATE: December 20, 2018  TIME: 2:03 PM

## 2018-12-20 NOTE — DISCHARGE INSTRUCTIONS
Diet for Stomach Gastritis   WHAT YOU NEED TO KNOW:   What is a diet for stomach gastritis? A diet for gastritis is a meal plan that limits foods that irritate your stomach  Certain foods may worsen symptoms such as stomach pain, bloating, heartburn, or indigestion  Which foods should I limit or avoid? You may need to avoid acidic, spicy, or high-fat foods  Not all foods affect everyone the same way  You will need to learn which foods worsen your symptoms and limit those foods  The following are some foods that may worsen ulcer or gastritis symptoms:  · Beverages:      ¨ Whole milk and chocolate milk    ¨ Hot cocoa and cola    ¨ Any beverage with caffeine    ¨ Regular and decaffeinated coffee    ¨ Peppermint and spearmint tea    ¨ Green and black tea, with or without caffeine    ¨ Orange and grapefruit juices    ¨ Drinks that contain alcohol    · Spices and seasonings:      ¨ Black and red pepper    ¨ Chili powder    ¨ Mustard seed and nutmeg    · Other foods:      ¨ Dairy foods made from whole milk or cream    ¨ Chocolate    ¨ Spicy or strongly flavored cheeses, such as jalapeno or black pepper    ¨ Highly seasoned, high-fat meats, such as sausage, salami, moralez, ham, and cold cuts    ¨ Hot chiles and peppers    ¨ Tomato products, such as tomato paste, tomato sauce, or tomato juice  Which foods can I eat and drink? Eat a variety of healthy foods from all the food groups  Eat fruits, vegetables, whole grains, and fat-free or low-fat dairy foods  Whole grains include whole-wheat breads, cereals, pasta, and brown rice  Choose lean meats, poultry (chicken and turkey), fish, beans, eggs, and nuts  A healthy meal plan is low in unhealthy fats, salt, and added sugar  Healthy fats include olive oil and canola oil  Ask your dietitian for more information about a healthy meal plan  What other guidelines may be helpful? · Do not eat right before bedtime  Stop eating at least 2 hours before bedtime      · Eat small, frequent meals  Your stomach may tolerate small, frequent meals better than large meals  CARE AGREEMENT:   You have the right to help plan your care  Discuss treatment options with your caregivers to decide what care you want to receive  You always have the right to refuse treatment  The above information is an  only  It is not intended as medical advice for individual conditions or treatments  Talk to your doctor, nurse or pharmacist before following any medical regimen to see if it is safe and effective for you  © 2017 [employer]  Information is for End User's use only and may not be sold, redistributed or otherwise used for commercial purposes  All illustrations and images included in CareNotes® are the copyrighted property of A D A M [employer]  or [employer]  Upper Endoscopy   AMBULATORY CARE:   What you need to know about an upper endoscopy: An upper endoscopy is also called an upper gastrointestinal (GI) endoscopy, or an esophagogastroduodenoscopy (EGD)  A scope (thin, flexible tube with a light and camera) is used to examine the walls of your upper intestines  The upper intestines include the esophagus, stomach, and duodenum (first part of the small intestine)  An upper endoscopy is used to look for problems, such as bleeding, polyps, ulcers, or infection  How to prepare for an upper endoscopy: Your healthcare provider will talk to you about how to prepare for your procedure  You may need to not eat or drink anything except water for 6 to 12 hours before the procedure  He will tell you what medicines to take or not take on the day of your procedure  Arrange to have someone drive you home  What will happen during an upper endoscopy:   · You will be given medicine through your IV to help you relax and make you drowsy  You will also be given medicine to numb your throat   You may need to wear a plastic mouthpiece to help hold your mouth open and protect your teeth and tongue  Your healthcare provider will gently insert the endoscope through your mouth and down into your throat  You may be asked to swallow once to help move the scope into your upper intestines  You may feel pressure in your throat but you should not feel pain  The endoscope does not restrict your breathing  · Your healthcare provider will watch the scope on a monitor  He will take pictures with the scope  He may gently inject air so he can see your digestive tract clearly  Your healthcare provider may take tissue samples and send them to the lab for tests  He may remove foreign objects, tumors, or polyps that may be blocking your upper intestines  Your healthcare provider may also insert tools with the scope to treat bleeding or place a stent (tube)  When the procedure is finished, the endoscope will be slowly removed  What will happen after an upper endoscopy: You may feel bloated, gassy, or have some abdominal discomfort  Your throat may be sore for 24 to 36 hours after the procedure  You may burp or pass gas from air that is still inside your body after your procedure  You may need to take short walks to help move the gas out  Eat small meals, if you feel bloated  Do not drive or make important decisions until the day after your procedure  Risks of an upper endoscopy: Your esophagus, stomach, or duodenum may be punctured or torn during the procedure  This is because of increased pressure as the scope and air are passing through  You may bleed more than expected or get an infection  You may have a slow or irregular heartbeat, or low blood pressure  This can cause sweating and fainting  Fluid may enter your lungs and you may have trouble breathing  These problems can be life-threatening  Call 911 if:   · You have sudden chest pain or trouble breathing  Seek care immediately if:   · You feel dizzy or faint  · You have trouble swallowing  · You have severe throat pain      · Your bowel movements are very dark or black  · Your abdomen is hard and firm and you have severe pain  · You vomit blood  Contact your healthcare provider if:   · You feel full or bloated and cannot burp or pass gas  · You have not had a bowel movement for 3 days after your procedure  · You have neck pain  · You have a fever or chills  · You have nausea or are vomiting  · You have a rash or hives  · You have questions or concerns about your endoscopy  Relieve a sore throat:  Suck on throat lozenges or crushed ice  Gargle with a small amount of warm salt water  Mix 1 teaspoon of salt and 1 cup of warm water to make salt water  Relieve gas and discomfort from bloating:  Lie on your right side with a heating pad on your abdomen  Take short walks to help pass gas  Eat small meals until bloating is relieved  Rest after your procedure: You have been given medicine to relax you  Do not  drive or make important decisions until the day after your procedure  Return to your normal activity as directed  You can usually return to work the day after your procedure  Follow up with your healthcare provider as directed:  Write down your questions so you remember to ask them during your visits  © 2017 [employer] Information is for End User's use only and may not be sold, redistributed or otherwise used for commercial purposes  All illustrations and images included in CareNotes® are the copyrighted property of [employer]  or Morgan Valenzuela  The above information is an  only  It is not intended as medical advice for individual conditions or treatments  Talk to your doctor, nurse or pharmacist before following any medical regimen to see if it is safe and effective for you

## 2018-12-20 NOTE — OP NOTE
OPERATIVE REPORT  PATIENT NAME: Sha Albert    :  1965  MRN: [id number]  Pt Location: [facility] GI ROOM 01    SURGERY DATE: 2018    Surgeon(s) and Role:     * Pearl Duverney, MD - Primary    Preop Diagnosis:  Gastroesophageal reflux disease without esophagitis [K21 9]  Dysphagia, unspecified type [R13 10]  Esophageal polyp [K22 8]    Post-Op Diagnosis Codes:     * Gastroesophageal reflux disease without esophagitis [K21 9]     * Dysphagia, unspecified type [R13 10]     * Esophageal polyp [K22 8]    Procedure(s) (LRB):  ESOPHAGOGASTRODUODENOSCOPY (EGD) (N/A)    Specimen(s):  ID Type Source Tests Collected by Time Destination   1 : duodenum biopsy r/o celiac  Tissue Duodenum TISSUE EXAM Pearl Duverney, MD 2018 1354    2 : stomach biopsy r/o h pylori  Tissue Stomach TISSUE EXAM Pearl Duverney, MD 2018 1355    3 : mid esophagus biopsy r/o EOE  Tissue Esophagus TISSUE EXAM Pearl Duverney, MD 2018 1358        Estimated Blood Loss:   Minimal    ESOPHAGOGASTRODUODENOSCOPY    PROCEDURE: EGD/ Biopsy, Esophageal Dilation (TTS-Balloon)    INDICATIONS: Abdominal pain, Epigastric and Dysphagia    POST-OP DIAGNOSIS: See the impression below    SEDATION: Monitored anesthesia care, check anesthesia records    PHYSICAL EXAM:    Vitals:    18 1334   BP: 117/63   Pulse: 81   Resp: 16   Temp: 99 °F (37 2 °C)   SpO2: 99%    Body mass index is 31 45 kg/m²  General: NAD  Heart: S1 & S2 normal, RRR  Lungs: CTA, No rales or rhonchi  Abdomen: Soft, nontender, nondistended, good bowel sounds    CONSENT:  Informed consent was obtained for the procedure, including sedation after explaining the risks and benefits of the procedure  Risks including but not limited to bleeding, perforation, infection, aspiration were discussed in detail  Also explained about less than 100% sensitivity with the exam and other alternatives       PREPARATION:   EKG tracing, pulse oximetry, blood pressure were monitored throughout the procedure  Patient was identified by myself both verbally and by visual inspection of ID band  DESCRIPTION:   Patient was placed in the left lateral decubitus position and was sedated with the above medication  The gastroscope was introduced in to the oropharynx and the esophagus was intubated under direct visualization  Scope was passed down the esophagus up to 2nd part of the duodenum  A careful inspection was made as the gastroscope was withdrawn, including a retroflexed view of the stomach; findings and interventions are described below  FINDINGS:    #1  Esophagus and GEJ- normal esophagus, random forcep biopsies taken throughout the midesophagus to rule out eosinophilic esophagitis  Mild tightening at the GE junction, through the scope balloon used to dilate to 19 mm without resistance  #2  Stomach- erosive antral gastritis, cold forcep biopsies taken to rule out H pylori   hiatal hernia seen on GE junction retroflexed views  #3  Duodenum- mild erosive duodenitis of the duodenal bulb and 2nd portion of the duodenum  Biopsies taken  IMPRESSIONS:      Normal esophagus  Esophageal dilation performed at the GE junction to 19 mm  Erosive antral gastritis  Mild erosive duodenitis     RECOMMENDATIONS:     Start PPI  Avoid NSAIDs  Anti-reflux measures  Resume regular diet  Discharge home    COMPLICATIONS:  None; patient tolerated the procedure well            DISPOSITION: PACU           CONDITION: Stable    SIGNATURE: Steven Madden MD  DATE: December 20, 2018  TIME: 2:03 PM

## 2018-12-20 NOTE — ANESTHESIA PREPROCEDURE EVALUATION
Review of Systems/Medical History          Cardiovascular  Negative cardio ROS    Pulmonary  Negative pulmonary ROS        GI/Hepatic    GERD ,             Endo/Other  Negative endo/other ROS      GYN       Hematology  Anemia ,     Musculoskeletal  Negative musculoskeletal ROS        Neurology    Headaches,    Psychology           Physical Exam    Airway    Mallampati score: I  TM Distance: >3 FB  Neck ROM: full     Dental       Cardiovascular  Comment: Negative ROS, Rhythm: regular, Rate: normal, Cardiovascular exam normal    Pulmonary  Pulmonary exam normal     Other Findings        Anesthesia Plan  ASA Score- 2     Anesthesia Type- IV sedation with anesthesia with ASA Monitors  Additional Monitors:   Airway Plan:         Plan Factors-    Induction- intravenous  Postoperative Plan-     Informed Consent- Anesthetic plan and risks discussed with patient

## 2018-12-21 ENCOUNTER — TELEPHONE (OUTPATIENT)
Dept: NEUROLOGY | [place] CLINIC | Age: 53
End: 2018-12-21

## 2018-12-21 ENCOUNTER — OFFICE VISIT (OUTPATIENT)
Dept: OCCUPATIONAL THERAPY | [place] CLINIC | Age: 53
End: 2018-12-21
Payer: COMMERCIAL

## 2018-12-21 DIAGNOSIS — G31.84 MILD COGNITIVE IMPAIRMENT: Primary | ICD-10-CM

## 2018-12-21 PROCEDURE — G8991 OTHER PT/OT GOAL STATUS: HCPCS

## 2018-12-21 PROCEDURE — 96125 COGNITIVE TEST BY HC PRO: CPT

## 2018-12-21 PROCEDURE — G8990 OTHER PT/OT CURRENT STATUS: HCPCS

## 2018-12-21 PROCEDURE — G8992 OTHER PT/OT  D/C STATUS: HCPCS

## 2018-12-21 PROCEDURE — 97165 OT EVAL LOW COMPLEX 30 MIN: CPT
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
## 2018-12-21 NOTE — TELEPHONE ENCOUNTER
Form completed, faxed to [facility] and will be scanned into patient's chart

## 2018-12-21 NOTE — TELEPHONE ENCOUNTER
Please advise patient that she needs to have an official [place] [place] on the road after her FTD testing  She scored cognitively at 55% predicted probability of failing a specialized on-road test   [facility]   Thanks     [employer]

## 2019-01-02 ENCOUNTER — TELEPHONE (OUTPATIENT)
Dept: NEUROLOGY | [place] CLINIC | Age: 54
End: 2019-01-02

## 2019-01-02 NOTE — TELEPHONE ENCOUNTER
Pt called requesting return to work letter  Pt became very upset and started yelling "How [email or web address] can it be to write a letter? I just need the [email or web address] letter " I reviewed 12/21, 11/16, and 10/25, 10/31 encounters with pt  However pt  adamant that PT/OT cleared her "to my face that I am ok to work " Per encounters pt to have on the road test w/ [employer]  Additionally, PT/OT cannot clear her and deferred to neuro  Pt Jamir Cartagena [id number] for 12/13 appt w/ Dr Yolanda Ziegler and now has appt 2/15  I made her aware that I will send encounter over to [facility] to review upon her return  I asked pt if we could leave her a message at home #  Pt responded "that's if you can reach me   I sleep during day and I am up all night  I am not on any of the meds [facility] put me on  And I am not seeing a therapist or psychiatrist  For what? So they can tell me that I shouldn't hang around bad boys? 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

## 2019-01-03 NOTE — TELEPHONE ENCOUNTER
She needs to be seen by Dr Chiara Hansen    She also needs to have [employer] driving test   I cannot write a letter to clear her as she has not been compliant with follow up, therapy or medications

## 2019-01-04 NOTE — TELEPHONE ENCOUNTER
Pt made aware  She states that she is not taking any  medications or doing therapy  She has not scheduled [employer] driving test   I did advise that she call [employer] to schedule this  I gave her [employer]'s phone number  Made her aware to follow up with dr Pascual Rocha and I confirmed appt date with her  I advised that it would be helpful if she had [employer] driving test prior to appt if possible

## 2019-01-06 NOTE — PROGRESS NOTES
Please call patient with pathology results - inflammation of duodenum - should continue prilosec for this   Stomach and esophagus otherwise normal

## 2019-01-08 NOTE — TELEPHONE ENCOUNTER
A form was submitted to [facility] ON 12/21/18 and this is scanned under media  It does state that she needs on the road competency testing  So, this form WAS submitted    SHE should be able to contact jorge l to discuss

## 2019-01-08 NOTE — TELEPHONE ENCOUNTER
Patient very short with me on the phone, she states she spoke to [facility] and they will not allow her to schedule a test, and was told that we have to fill out a  medical form and send it to them, (she doesn't know what the name of the form is) She wants us to call them and they "can explain it better"  [phone]     She states is is Marta's fault that she hasn't been able to return to work and "was lied to at the office visit" and needs to get back to work asap and they are holding her position for her

## 2019-01-10 NOTE — TELEPHONE ENCOUNTER
Called and advised pt of all of the below  She verbalized clear understanding  Pt states that per [facility], form was not recieved  Re-faxed form to [employer] as requested  Advised pt to call [facility] tmrw and f/u  Pt verbalized understanding

## 2019-01-15 ENCOUNTER — TELEPHONE (OUTPATIENT)
Dept: FAMILY MEDICINE CLINIC | [place] CLINIC | Age: 54
End: 2019-01-15

## 2019-01-15 ENCOUNTER — OFFICE VISIT (OUTPATIENT)
Dept: FAMILY MEDICINE CLINIC | [place] CLINIC | Age: 54
End: 2019-01-15
Payer: COMMERCIAL

## 2019-01-15 VITALS
TEMPERATURE: 98.7 F | WEIGHT: 189.6 LBS | DIASTOLIC BLOOD PRESSURE: 74 MMHG | BODY MASS INDEX: 31.59 KG/M2 | RESPIRATION RATE: 17 BRPM | SYSTOLIC BLOOD PRESSURE: 122 MMHG | HEIGHT: 65 IN

## 2019-01-15 DIAGNOSIS — I73.9 CLAUDICATION OF BOTH LOWER EXTREMITIES (HCC): ICD-10-CM

## 2019-01-15 DIAGNOSIS — Z13.220 SCREENING FOR CHOLESTEROL LEVEL: ICD-10-CM

## 2019-01-15 DIAGNOSIS — Z11.1 SCREENING FOR TUBERCULOSIS: ICD-10-CM

## 2019-01-15 DIAGNOSIS — Z13.29 SCREENING FOR THYROID DISORDER: ICD-10-CM

## 2019-01-15 DIAGNOSIS — Z00.00 PREVENTATIVE HEALTH CARE: Primary | ICD-10-CM

## 2019-01-15 DIAGNOSIS — Z13.0 SCREENING FOR IRON DEFICIENCY ANEMIA: ICD-10-CM

## 2019-01-15 DIAGNOSIS — Z13.1 SCREENING FOR DIABETES MELLITUS: ICD-10-CM

## 2019-01-15 PROCEDURE — 99396 PREV VISIT EST AGE 40-64: CPT | Performed by: FAMILY MEDICINE

## 2019-01-15 PROCEDURE — 86580 TB INTRADERMAL TEST: CPT | Performed by: FAMILY MEDICINE

## 2019-01-15 NOTE — PROGRESS NOTES
Assessment/Plan:   1  Preventative health care  Patient appears well today  Reviewed health maintenance measures with her  She is up-to-date with her vaccines  She is also up-to-date with her mammography  She had her colonoscopy at [facility]   She will be due for repeat in 2026  Reviewed her work requirements with her  At this time, she appears to be in good health and good physical condition to lift patients  Her she was encouraged to continue with her regular exercise as well as a strict diet  Ppd was placed today  Follow-up in 48 hours to read  2  Screening for cholesterol level  - Lipid Panel with Direct LDL reflex; Future  - Lipid Panel with Direct LDL reflex    3  Screening for diabetes mellitus  - Comprehensive metabolic panel; Future  - Comprehensive metabolic panel    4  Screening for iron deficiency anemia  - CBC and Platelet; Future  - CBC and Platelet    5  Screening for thyroid disorder  - TSH, 3rd generation with Free T4 reflex; Future  - TSH, 3rd generation with Free T4 reflex    6  Screening for tuberculosis  - TB Skin Test    7  Claudication of both lower extremities ([address] )  Patient's symptoms appear likely secondary to possible claudication  Will check lower extremity arterial Doppler to further rule out gross abnormalities  She was advised on importance of maintaining proper exercise  - VAS lower limb arterial duplex, complete bilateral; Future     Diagnoses and all orders for this visit:    Preventative health care    Screening for cholesterol level    Screening for diabetes mellitus    Screening for iron deficiency anemia    Screening for thyroid disorder    Screening for tuberculosis          Subjective:    Chief Complaint   Patient presents with    Physical Exam    TB Test        Patient ID: Nelson Salgado is a 48 y o  female  Nelson Salgado presents for health maintenance visit   48 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    He/she states that  level of health is:  good Dental issues :no    Vision issues: no    Hearing issues: no    Up-to-date on immunizations: no    Diet: good     Exercise:  intermittently    Tobacco: no    ETOH: Minimal     Illegal drugs: no            Review of Systems   Constitutional: Negative for activity change, chills, fatigue and fever  HENT: Negative for congestion, ear pain, sinus pressure and sore throat  Eyes: Negative for redness, itching and visual disturbance  Respiratory: Negative for cough and shortness of breath  Cardiovascular: Negative for chest pain and palpitations  Gastrointestinal: Negative for abdominal pain, diarrhea and nausea  Endocrine: Negative for cold intolerance and heat intolerance  Genitourinary: Negative for dysuria, flank pain and frequency  Musculoskeletal: Negative for arthralgias, back pain, gait problem and myalgias  Skin: Negative for color change  Allergic/Immunologic: Negative for environmental allergies  Neurological: Negative for dizziness, numbness and headaches  Psychiatric/Behavioral: Negative for behavioral problems and sleep disturbance  The following portions of the patient's history were reviewed and updated as appropriate : past family history, past medical history, past social history and past surgical history        Current Outpatient Prescriptions:     acetaminophen (TYLENOL) 500 mg tablet, Take 1,000 mg by mouth 2 (two) times a day as needed for mild pain  , Disp: , Rfl:     gabapentin (NEURONTIN) 300 mg capsule, Take 1 capsule (300 mg total) by mouth 3 (three) times a day 1 cap at bedtime for 3 nights, am and bedtime for 3 days and then 3 times a day (Patient not taking: Reported on 1/15/2019 ), Disp: 90 capsule, Rfl: 0    omeprazole (PriLOSEC) 40 MG capsule, Take 1 capsule (40 mg total) by mouth daily (Patient not taking: Reported on 1/15/2019 ), Disp: 30 capsule, Rfl: 3    ranitidine (ZANTAC) 150 mg tablet, Take 150 mg by mouth daily  , Disp: , Rfl: Objective:    Vitals:    01/15/19 1340   BP: 122/74   BP Location: Left arm   Patient Position: Sitting   Cuff Size: Large   Resp: 17   Temp: 98 7 °F (37 1 °C)   TempSrc: Tympanic   Weight: 86 kg (189 lb 9 6 oz)   Height: 5' 5" (1 651 m)        Physical Exam   Constitutional: She is oriented to person, place, and time  She appears well-developed and well-nourished  HENT:   Head: Normocephalic and atraumatic  Nose: Nose normal    Mouth/Throat: No oropharyngeal exudate  Eyes: Pupils are equal, round, and reactive to light  Right eye exhibits no discharge  Left eye exhibits no discharge  Neck: Normal range of motion  Neck supple  No tracheal deviation present  Cardiovascular: Normal rate, regular rhythm and intact distal pulses  Exam reveals no gallop and no friction rub  No murmur heard  Pulses:       Dorsalis pedis pulses are 2+ on the right side, and 2+ on the left side  Posterior tibial pulses are 2+ on the right side, and 2+ on the left side  Pulmonary/Chest: Effort normal and breath sounds normal  No respiratory distress  She has no wheezes  She has no rales  Abdominal: Soft  Bowel sounds are normal  She exhibits no distension  There is no tenderness  There is no rebound and no guarding  Musculoskeletal: Normal range of motion  She exhibits no edema  Lymphadenopathy:        Head (right side): No submental and no submandibular adenopathy present  Head (left side): No submental and no submandibular adenopathy present  She has no cervical adenopathy  Right cervical: No superficial cervical, no deep cervical and no posterior cervical adenopathy present  Left cervical: No superficial cervical, no deep cervical and no posterior cervical adenopathy present  Neurological: She is alert and oriented to person, place, and time  No cranial nerve deficit or sensory deficit  Skin: Skin is warm, dry and intact     Psychiatric: Her speech is normal and behavior is normal  Judgment normal  Her mood appears not anxious  Cognition and memory are normal  She does not exhibit a depressed mood  Vitals reviewed

## 2019-01-15 NOTE — TELEPHONE ENCOUNTER
Pt is scheduled for a routine VAS lower limb arterial duplex, complete bilateral on Thursday January 24 th at 2:30 pm at [facility] [address] ( pt given address on AVS it was highlighted)   pt is to please arrive 15 minutes early with photo id and insurance cards  I provided with her with the address and info no prep I did advise her to bring her AVS along to that appointment  Pt did request a copy of her insurance card just in case she leaves it at home by mistake  Pt also given out of work note for today stating she may return to work with no restrictions and or limitations

## 2019-01-16 ENCOUNTER — DOCUMENTATION (OUTPATIENT)
Dept: FAMILY MEDICINE CLINIC | [place] CLINIC | Age: 54
End: 2019-01-16

## 2019-01-16 NOTE — PROGRESS NOTES
I SPOKE TO DEIDRE TROY AT [facility] AND NO AUTH IS NEEDED FOR [id number] AND [id number] AT THIS TIME

## 2019-01-17 ENCOUNTER — CLINICAL SUPPORT (OUTPATIENT)
Dept: FAMILY MEDICINE CLINIC | [place] CLINIC | Age: 54
End: 2019-01-17

## 2019-01-17 DIAGNOSIS — Z11.1 ENCOUNTER FOR PPD SKIN TEST READING: Primary | ICD-10-CM

## 2019-01-17 LAB
INDURATION: 0X0 MM
TB SKIN TEST: NEGATIVE

## 2019-01-17 PROCEDURE — RECHECK: Performed by: FAMILY MEDICINE

## 2019-01-24 ENCOUNTER — HOSPITAL ENCOUNTER (OUTPATIENT)
Dept: NON INVASIVE DIAGNOSTICS | [place] CLINIC | Age: 54
Discharge: HOME | End: 2019-01-24
Payer: COMMERCIAL

## 2019-01-24 DIAGNOSIS — I73.9 CLAUDICATION OF BOTH LOWER EXTREMITIES (HCC): ICD-10-CM

## 2019-01-24 PROCEDURE — 93923 UPR/LXTR ART STDY 3+ LVLS: CPT | Performed by: SURGERY

## 2019-01-24 PROCEDURE — 93923 UPR/LXTR ART STDY 3+ LVLS: CPT

## 2019-01-29 ENCOUNTER — OFFICE VISIT (OUTPATIENT)
Dept: FAMILY MEDICINE CLINIC | [place] CLINIC | Age: 54
End: 2019-01-29
Payer: COMMERCIAL

## 2019-01-29 VITALS
WEIGHT: 198 LBS | BODY MASS INDEX: 32.99 KG/M2 | SYSTOLIC BLOOD PRESSURE: 110 MMHG | OXYGEN SATURATION: 95 % | HEIGHT: 65 IN | DIASTOLIC BLOOD PRESSURE: 70 MMHG | TEMPERATURE: 99 F | HEART RATE: 98 BPM | RESPIRATION RATE: 16 BRPM

## 2019-01-29 DIAGNOSIS — G44.309 POST-CONCUSSION HEADACHE: Primary | ICD-10-CM

## 2019-01-29 PROCEDURE — 3008F BODY MASS INDEX DOCD: CPT | Performed by: FAMILY MEDICINE

## 2019-01-29 PROCEDURE — 99214 OFFICE O/P EST MOD 30 MIN: CPT | Performed by: FAMILY MEDICINE

## 2019-04-09 ENCOUNTER — TELEPHONE (OUTPATIENT)
Dept: FAMILY MEDICINE CLINIC | [place] CLINIC | Age: 54
End: 2019-04-09

## 2019-04-25 ENCOUNTER — TELEPHONE (OUTPATIENT)
Dept: FAMILY MEDICINE CLINIC | [place] CLINIC | Age: 54
End: 2019-04-25

## 2019-04-25 DIAGNOSIS — R53.83 FATIGUE, UNSPECIFIED TYPE: Primary | ICD-10-CM

## 2019-04-25 DIAGNOSIS — Z13.29 SCREENING FOR THYROID DISORDER: ICD-10-CM

## 2019-04-25 DIAGNOSIS — Z13.1 SCREENING FOR DIABETES MELLITUS: ICD-10-CM

## 2019-04-25 DIAGNOSIS — E78.2 MIXED HYPERLIPIDEMIA: ICD-10-CM

## 2019-04-25 DIAGNOSIS — D50.0 IRON DEFICIENCY ANEMIA DUE TO CHRONIC BLOOD LOSS: ICD-10-CM

## 2019-05-30 ENCOUNTER — OFFICE VISIT (OUTPATIENT)
Dept: FAMILY MEDICINE CLINIC | [place] CLINIC | Age: 54
End: 2019-05-30
Payer: COMMERCIAL

## 2019-05-30 VITALS
DIASTOLIC BLOOD PRESSURE: 80 MMHG | BODY MASS INDEX: 34.49 KG/M2 | RESPIRATION RATE: 16 BRPM | HEART RATE: 84 BPM | SYSTOLIC BLOOD PRESSURE: 120 MMHG | WEIGHT: 207 LBS | HEIGHT: 65 IN | TEMPERATURE: 98.9 F

## 2019-05-30 DIAGNOSIS — H66.90 ACUTE OTITIS MEDIA, UNSPECIFIED OTITIS MEDIA TYPE: Primary | ICD-10-CM

## 2019-05-30 PROCEDURE — 69209 REMOVE IMPACTED EAR WAX UNI: CPT | Performed by: NURSE PRACTITIONER

## 2019-05-30 PROCEDURE — 1036F TOBACCO NON-USER: CPT | Performed by: NURSE PRACTITIONER

## 2019-05-30 PROCEDURE — 3008F BODY MASS INDEX DOCD: CPT | Performed by: NURSE PRACTITIONER

## 2019-05-30 PROCEDURE — 99213 OFFICE O/P EST LOW 20 MIN: CPT | Performed by: NURSE PRACTITIONER

## 2019-05-30 RX ORDER — PREDNISONE 10 MG/1
TABLET ORAL
Qty: 15 TABLET | Refills: 0 | Status: SHIPPED | OUTPATIENT
Start: 2019-05-30 | End: 2019-07-08

## 2019-05-30 RX ORDER — AZITHROMYCIN 250 MG/1
TABLET, FILM COATED ORAL
Qty: 6 TABLET | Refills: 0 | Status: SHIPPED | OUTPATIENT
Start: 2019-05-30 | End: 2019-06-03

## 2019-07-08 ENCOUNTER — OFFICE VISIT (OUTPATIENT)
Dept: NEUROLOGY | [place] CLINIC | Age: 54
End: 2019-07-08
Payer: COMMERCIAL

## 2019-07-08 VITALS
SYSTOLIC BLOOD PRESSURE: 132 MMHG | BODY MASS INDEX: 33.94 KG/M2 | DIASTOLIC BLOOD PRESSURE: 82 MMHG | HEART RATE: 82 BPM | WEIGHT: 203.7 LBS | HEIGHT: 65 IN | RESPIRATION RATE: 14 BRPM

## 2019-07-08 DIAGNOSIS — S06.9X0D MILD TRAUMATIC BRAIN INJURY, WITHOUT LOSS OF CONSCIOUSNESS, SUBSEQUENT ENCOUNTER: Primary | ICD-10-CM

## 2019-07-08 DIAGNOSIS — G31.84 MILD COGNITIVE IMPAIRMENT: ICD-10-CM

## 2019-07-08 PROCEDURE — 99354 PR PROLONGED SVC OUTPATIENT SETTING 1ST HOUR: CPT | Performed by: PSYCHIATRY & NEUROLOGY

## 2019-07-08 PROCEDURE — 99215 OFFICE O/P EST HI 40 MIN: CPT | Performed by: PSYCHIATRY & NEUROLOGY

## 2019-07-08 NOTE — PROGRESS NOTES
Review of Systems   Constitutional: Negative  Negative for appetite change and fever  HENT: Negative  Negative for hearing loss, tinnitus, trouble swallowing and voice change  Eyes: Negative  Negative for photophobia and pain  Respiratory: Negative  Negative for shortness of breath  Cardiovascular: Negative  Negative for palpitations  Gastrointestinal: Negative  Negative for nausea and vomiting  Endocrine: Negative  Negative for cold intolerance and heat intolerance  Genitourinary: Negative  Negative for dysuria, frequency and urgency  Musculoskeletal: Negative  Negative for myalgias and neck pain  Skin: Negative  Negative for rash  Allergic/Immunologic: Negative  Neurological: Negative  Negative for dizziness, tremors, seizures, syncope, facial asymmetry, speech difficulty, weakness, light-headedness, numbness and headaches  Hematological: Negative  Does not bruise/bleed easily  Psychiatric/Behavioral: Negative  Negative for confusion, hallucinations and sleep disturbance
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
## 2019-07-08 NOTE — PATIENT INSTRUCTIONS
Forms filled out today for [facility] regarding significant improvement in cognitive function  - ultimately it is up to [facility] dot regarding your license     Additional Testing:   I recommend with next blood draw getting B12 and if less than 400 either IM injections or at least sublingual supplementation with [id number] mcg daily     Lifestyle Recommendations:  [x] SLEEP - Maintain a regular sleep schedule: Adults need at least 7-8 hours of uninterrupted a night  Maintain good sleep hygiene:  Going to bed and waking up at consistent times, avoiding excessive daytime naps, avoiding caffeinated beverages in the evening, avoid excessive stimulation in the evening and generally using bed primarily for sleeping  One hour before bedtime would recommend turning lights down lower, decreasing your activity (may read quietly, listen to music at a low volume)  When you get into bed, should eliminate all technology (no texting, emailing, playing with your phone, iPad or tablet in bed)  [x] HYDRATION - Maintain good hydration  Drink  2L of fluid a day (4 typical small water bottles)  [x] DIET - Maintain good nutrition  In particular don't skip meals and try and eat healthy balanced meals regularly  [x] TRIGGERS - Look for other triggers and avoid them: Limit caffeine to 1-2 cups a day or less  Avoid dietary triggers that you have noticed bring on your headaches (this could include aged cheese, peanuts, MSG, aspartame and nitrates)  [x] EXERCISE - physical exercise as we all know is good for you in many ways, and not only is good for your heart, but also is beneficial for your mental health, cognitive health and  chronic pain/headaches  I would encourage at the least 5 days of physical exercise weekly for at least 30 minutes  Education and Follow-up  [x] Please call with any questions or concerns  Of course if any new concerning symptoms go to the emergency department    [x] Follow up with primary care as scheduled and with neurology if needed ( mental health care if any return of mood symptoms

## 2019-08-01 ENCOUNTER — HOSPITAL ENCOUNTER (EMERGENCY)
[place] HOSPITAL | Age: 54
Discharge: HOME | End: 2019-08-01
Attending: EMERGENCY MEDICINE | Admitting: EMERGENCY MEDICINE
Payer: COMMERCIAL

## 2019-08-01 VITALS
RESPIRATION RATE: 16 BRPM | OXYGEN SATURATION: 99 % | TEMPERATURE: 99 F | DIASTOLIC BLOOD PRESSURE: 70 MMHG | SYSTOLIC BLOOD PRESSURE: 110 MMHG | WEIGHT: 205.47 LBS | HEART RATE: 85 BPM | BODY MASS INDEX: 34.23 KG/M2

## 2019-08-01 DIAGNOSIS — R19.7 DIARRHEA: Primary | ICD-10-CM

## 2019-08-01 DIAGNOSIS — H92.02 OTALGIA OF LEFT EAR: ICD-10-CM

## 2019-08-01 LAB
ALBUMIN SERPL BCP-MCNC: 3.9 G/DL (ref 3.5–5)
ALP SERPL-CCNC: 74 U/L (ref 46–116)
ALT SERPL W P-5'-P-CCNC: 31 U/L (ref 12–78)
ANION GAP SERPL CALCULATED.3IONS-SCNC: 7 MMOL/L (ref 4–13)
AST SERPL W P-5'-P-CCNC: 20 U/L (ref 5–45)
BASOPHILS # BLD AUTO: 0.02 THOUSANDS/ΜL (ref 0–0.1)
BASOPHILS NFR BLD AUTO: 0 % (ref 0–1)
BILIRUB SERPL-MCNC: 0.29 MG/DL (ref 0.2–1)
BILIRUB UR QL STRIP: NEGATIVE
BUN SERPL-MCNC: 17 MG/DL (ref 5–25)
CALCIUM SERPL-MCNC: 8.8 MG/DL (ref 8.3–10.1)
CHLORIDE SERPL-SCNC: 102 MMOL/L (ref 100–108)
CLARITY UR: CLEAR
CLARITY, POC: CLEAR
CO2 SERPL-SCNC: 30 MMOL/L (ref 21–32)
COLOR UR: YELLOW
COLOR, POC: YELLOW
CREAT SERPL-MCNC: 0.82 MG/DL (ref 0.6–1.3)
EOSINOPHIL # BLD AUTO: 0 THOUSAND/ΜL (ref 0–0.61)
EOSINOPHIL NFR BLD AUTO: 0 % (ref 0–6)
ERYTHROCYTE [DISTWIDTH] IN BLOOD BY AUTOMATED COUNT: 13.4 % (ref 11.6–15.1)
GFR SERPL CREATININE-BSD FRML MDRD: 81 ML/MIN/1.73SQ M
GLUCOSE SERPL-MCNC: 108 MG/DL (ref 65–140)
GLUCOSE UR STRIP-MCNC: NEGATIVE MG/DL
HCT VFR BLD AUTO: 38.5 % (ref 34.8–46.1)
HGB BLD-MCNC: 12.4 G/DL (ref 11.5–15.4)
HGB UR QL STRIP.AUTO: NEGATIVE
IMM GRANULOCYTES # BLD AUTO: 0.02 THOUSAND/UL (ref 0–0.2)
IMM GRANULOCYTES NFR BLD AUTO: 0 % (ref 0–2)
KETONES UR STRIP-MCNC: NEGATIVE MG/DL
LEUKOCYTE ESTERASE UR QL STRIP: NEGATIVE
LIPASE SERPL-CCNC: 147 U/L (ref 73–393)
LYMPHOCYTES # BLD AUTO: 1.41 THOUSANDS/ΜL (ref 0.6–4.47)
LYMPHOCYTES NFR BLD AUTO: 31 % (ref 14–44)
MCH RBC QN AUTO: 29.1 PG (ref 26.8–34.3)
MCHC RBC AUTO-ENTMCNC: 32.2 G/DL (ref 31.4–37.4)
MCV RBC AUTO: 90 FL (ref 82–98)
MONOCYTES # BLD AUTO: 0.35 THOUSAND/ΜL (ref 0.17–1.22)
MONOCYTES NFR BLD AUTO: 8 % (ref 4–12)
NEUTROPHILS # BLD AUTO: 2.74 THOUSANDS/ΜL (ref 1.85–7.62)
NEUTS SEG NFR BLD AUTO: 61 % (ref 43–75)
NITRITE UR QL STRIP: NEGATIVE
NRBC BLD AUTO-RTO: 0 /100 WBCS
PH UR STRIP.AUTO: 5.5 [PH] (ref 4.5–8)
PLATELET # BLD AUTO: 247 THOUSANDS/UL (ref 149–390)
PMV BLD AUTO: 10.1 FL (ref 8.9–12.7)
POTASSIUM SERPL-SCNC: 3.8 MMOL/L (ref 3.5–5.3)
PROT SERPL-MCNC: 7.3 G/DL (ref 6.4–8.2)
PROT UR STRIP-MCNC: NEGATIVE MG/DL
RBC # BLD AUTO: 4.26 MILLION/UL (ref 3.81–5.12)
SODIUM SERPL-SCNC: 139 MMOL/L (ref 136–145)
SP GR UR STRIP.AUTO: 1.02 (ref 1–1.03)
UROBILINOGEN UR QL STRIP.AUTO: 0.2 E.U./DL
WBC # BLD AUTO: 4.54 THOUSAND/UL (ref 4.31–10.16)

## 2019-08-01 PROCEDURE — 96360 HYDRATION IV INFUSION INIT: CPT

## 2019-08-01 PROCEDURE — 83690 ASSAY OF LIPASE: CPT | Performed by: EMERGENCY MEDICINE

## 2019-08-01 PROCEDURE — 81003 URINALYSIS AUTO W/O SCOPE: CPT

## 2019-08-01 PROCEDURE — 99282 EMERGENCY DEPT VISIT SF MDM: CPT | Performed by: EMERGENCY MEDICINE

## 2019-08-01 PROCEDURE — 87493 C DIFF AMPLIFIED PROBE: CPT | Performed by: EMERGENCY MEDICINE

## 2019-08-01 PROCEDURE — 85025 COMPLETE CBC W/AUTO DIFF WBC: CPT | Performed by: EMERGENCY MEDICINE

## 2019-08-01 PROCEDURE — 99284 EMERGENCY DEPT VISIT MOD MDM: CPT

## 2019-08-01 PROCEDURE — 36415 COLL VENOUS BLD VENIPUNCTURE: CPT | Performed by: EMERGENCY MEDICINE

## 2019-08-01 PROCEDURE — 96361 HYDRATE IV INFUSION ADD-ON: CPT

## 2019-08-01 PROCEDURE — 80053 COMPREHEN METABOLIC PANEL: CPT | Performed by: EMERGENCY MEDICINE

## 2019-08-01 RX ADMIN — SODIUM CHLORIDE 1000 ML: 0.9 INJECTION, SOLUTION INTRAVENOUS at 14:47

## 2019-08-01 NOTE — ED PROVIDER NOTES
History  Chief Complaint   Patient presents with    Diarrhea     L ear blocked several weeks ago after having bug in it and round of abx   diarrhea x 5/6 weeks w/ blood since that time  nausea  syncopal episodes  weakness  chills   Earache     71-year-old female presents for evaluation of approximately 3 weeks of loose watery diarrhea that began after the patient took a Z-Savage for a ear infection  The patient states that initially she had some bloody stools that have resolved however she has had persistence of loose watery stools  The patient has associated weakness  She gets occasional lightheadedness and near syncope  The patient denies any specific abdominal pain  She denies any dysuria  She denies any recent travel or questionable food ingestions  The patient denies any sick contacts  She has tried taking over-the-counter supplements and vitamins without improvement  None       Past Medical History:   Diagnosis Date    Head injury     Knee injury     right knee       Past Surgical History:   Procedure Laterality Date    LAPAROSCOPY      Exploratory    NC ESOPHAGOGASTRODUODENOSCOPY TRANSORAL DIAGNOSTIC N/A 12/20/2018    Procedure: ESOPHAGOGASTRODUODENOSCOPY (EGD); Surgeon: Elana Cheung MD;  Location: [facility] GI [facility]; Service: Gastroenterology       Family History   Problem Relation Age of Onset    Diabetes Mother     Hypertension Mother     Heart disease Mother      I have reviewed and agree with the history as documented  Social History     Tobacco Use    Smoking status: Former Smoker    Smokeless tobacco: Never Used    Tobacco comment: quit at age 22   Substance Use Topics    Alcohol use: Yes     Comment: socsial    Drug use: No        Review of Systems   Constitutional: Positive for fatigue  Negative for chills and fever  Gastrointestinal: Positive for diarrhea  Negative for abdominal distention, abdominal pain, constipation, nausea, rectal pain and vomiting  Neurological: Positive for weakness and light-headedness  All other systems reviewed and are negative  Physical Exam  Physical Exam   Constitutional: She is oriented to person, place, and time  She appears well-developed and well-nourished  No distress  HENT:   Head: Normocephalic and atraumatic  Right Ear: External ear normal    Left Ear: External ear normal    Eyes: Pupils are equal, round, and reactive to light  Conjunctivae and EOM are normal  No scleral icterus  Neck: Normal range of motion  Cardiovascular: Normal rate, regular rhythm and normal heart sounds  Pulmonary/Chest: Effort normal and breath sounds normal  No respiratory distress  Abdominal: Soft  Bowel sounds are normal  There is no tenderness  There is no rebound and no guarding  Musculoskeletal: Normal range of motion  She exhibits no edema  Neurological: She is alert and oriented to person, place, and time  Skin: Skin is warm and dry  No rash noted  Psychiatric: She has a normal mood and affect  Nursing note and vitals reviewed  Vital Signs  ED Triage Vitals   Temperature Pulse Respirations Blood Pressure SpO2   08/01/19 1413 08/01/19 1413 08/01/19 1413 08/01/19 1414 08/01/19 1413   99 °F (37 2 °C) 94 20 168/95 100 %      Temp src Heart Rate Source Patient Position - Orthostatic VS BP Location FiO2 (%)   -- 08/01/19 1534 08/01/19 1534 08/01/19 1534 --    Monitor Lying Left arm       Pain Score       08/01/19 1413       Worst Possible Pain           Vitals:    08/01/19 1413 08/01/19 1414 08/01/19 1534   BP:  168/95 110/70   Pulse: 94  85   Patient Position - Orthostatic VS:   Lying         Visual Acuity      ED Medications  Medications   sodium chloride 0 9 % bolus 1,000 mL (0 mL Intravenous Stopped 8/1/19 1635)       Diagnostic Studies  Results Reviewed     Procedure Component Value Units Date/Time    Clostridium difficile toxin by PCR [[id number]] Collected:  08/01/19 1637    Lab Status:   In process Specimen: Stool from Per Rectum Updated:  08/01/19 1639    POCT urinalysis dipstick [[id number]]  (Normal) Resulted:  08/01/19 1637    Lab Status:  Final result Specimen:  Urine Updated:  08/01/19 1637     Color, UA yellow     Clarity, UA clear    ED Urine Macroscopic [[id number]] Collected:  08/01/19 1649    Lab Status:  Final result Specimen:  Urine Updated:  08/01/19 1636     Color, UA Yellow     Clarity, UA Clear     pH, UA 5 5     Leukocytes, UA Negative     Nitrite, UA Negative     Protein, UA Negative mg/dl      Glucose, UA Negative mg/dl      Ketones, UA Negative mg/dl      Urobilinogen, UA 0 2 E U /dl      Bilirubin, UA Negative     Blood, UA Negative     Specific Gravity, UA 1 025    Narrative:       CLINITEK RESULT    Comprehensive metabolic panel [[id number]] Collected:  08/01/19 1447    Lab Status:  Final result Specimen:  Blood from Arm, Right Updated:  08/01/19 1514     Sodium 139 mmol/L      Potassium 3 8 mmol/L      Chloride 102 mmol/L      CO2 30 mmol/L      ANION GAP 7 mmol/L      BUN 17 mg/dL      Creatinine 0 82 mg/dL      Glucose 108 mg/dL      Calcium 8 8 mg/dL      AST 20 U/L      ALT 31 U/L      Alkaline Phosphatase 74 U/L      Total Protein 7 3 g/dL      Albumin 3 9 g/dL      Total Bilirubin 0 29 mg/dL      eGFR 81 ml/min/1 73sq m     Narrative:       [facility] guidelines for Chronic Kidney Disease (CKD):     Stage 1 with normal or high GFR (GFR > 90 mL/min/1 73 square meters)    Stage 2 Mild CKD (GFR = 60-89 mL/min/1 73 square meters)    Stage 3A Moderate CKD (GFR = 45-59 mL/min/1 73 square meters)    Stage 3B Moderate CKD (GFR = 30-44 mL/min/1 73 square meters)    Stage 4 Severe CKD (GFR = 15-29 mL/min/1 73 square meters)    Stage 5 End Stage CKD (GFR <15 mL/min/1 73 square meters)  Note: GFR calculation is accurate only with a steady state creatinine    Lipase [[id number]]  (Normal) Collected:  08/01/19 1447    Lab Status:  Final result Specimen:  Blood from Arm, Right Updated:  08/01/19 1514     Lipase 147 u/L     CBC and differential [[id number]] Collected:  08/01/19 1447    Lab Status:  Final result Specimen:  Blood from Arm, Right Updated:  08/01/19 1458     WBC 4 54 Thousand/uL      RBC 4 26 Million/uL      Hemoglobin 12 4 g/dL      Hematocrit 38 5 %      MCV 90 fL      MCH 29 1 pg      MCHC 32 2 g/dL      RDW 13 4 %      MPV 10 1 fL      Platelets 912 Thousands/uL      nRBC 0 /100 WBCs      Neutrophils Relative 61 %      Immat GRANS % 0 %      Lymphocytes Relative 31 %      Monocytes Relative 8 %      Eosinophils Relative 0 %      Basophils Relative 0 %      Neutrophils Absolute 2 74 Thousands/µL      Immature Grans Absolute 0 02 Thousand/uL      Lymphocytes Absolute 1 41 Thousands/µL      Monocytes Absolute 0 35 Thousand/µL      Eosinophils Absolute 0 00 Thousand/µL      Basophils Absolute 0 02 Thousands/µL                  No orders to display              Procedures  Procedures       ED Course                               MDM  Number of Diagnoses or Management Options  Diarrhea:   Otalgia of left ear:   Diagnosis management comments: Plan is to check laboratories to rule out electrolyte disturbance, dehydration, renal failure  If a loose stool was produced sent for C diff  Patient had large formed bowel movement was subsequently discharged home for outpatient follow-up    The patient (and any family present) verbalized understanding of the discharge instructions and warnings that would necessitate return to the Emergency Department  All questions were answered prior to discharge         Amount and/or Complexity of Data Reviewed  Clinical lab tests: ordered and reviewed        Disposition  Final diagnoses:   Diarrhea   Otalgia of left ear     Time reflects when diagnosis was documented in both MDM as applicable and the Disposition within this note     Time User Action Codes Description Comment    8/1/2019  4:11 PM Atha Come [R19 7] Diarrhea     8/1/2019 4:12 PM Bree Parmar Add [H92 02] Otalgia of left ear       ED Disposition     ED Disposition Condition Date/Time Comment    Discharge Stable Thu Aug 1, 2019  4:11 PM Grant Verdugo discharge to home/self care  Follow-up Information     Follow up With Specialties Details Why Contact Info Additional [address] [facility] Ot[facility]ology Schedule an appointment as soon as possible for a visit  For further evaluation of ear pain [address]  [address] [facility] [id number]  [address] [place] [address], [address] [place], [place] [address]          There are no discharge medications for this patient  Outpatient Discharge Orders   Clostridium difficile toxin by PCR   Standing Status: Future Standing Exp   Date: 08/01/20       ED Provider  Electronically Signed by           Ngoc Marti DO  08/01/19 1345

## 2019-08-02 LAB — C DIFF TOX GENS STL QL NAA+PROBE: NORMAL

## 2019-09-10 ENCOUNTER — HOSPITAL ENCOUNTER (OUTPATIENT)
Dept: MRI IMAGING | [place] HOSPITAL | Age: 54
Discharge: HOME | End: 2019-09-10
Payer: COMMERCIAL

## 2019-09-10 DIAGNOSIS — H90.A22 SENSORINEURAL HEARING LOSS (SNHL) OF LEFT EAR WITH RESTRICTED HEARING OF RIGHT EAR: ICD-10-CM

## 2019-09-10 PROCEDURE — 70553 MRI BRAIN STEM W/O & W/DYE: CPT

## 2019-09-10 PROCEDURE — A9585 GADOBUTROL INJECTION: HCPCS | Performed by: PHYSICIAN ASSISTANT

## 2019-09-10 RX ADMIN — GADOBUTROL 9 ML: 604.72 INJECTION INTRAVENOUS at 15:48

## 2019-11-05 ENCOUNTER — OFFICE VISIT (OUTPATIENT)
Dept: FAMILY MEDICINE CLINIC | [place] CLINIC | Age: 54
End: 2019-11-05
Payer: COMMERCIAL

## 2019-11-05 VITALS
WEIGHT: 198.8 LBS | OXYGEN SATURATION: 98 % | BODY MASS INDEX: 33.94 KG/M2 | HEART RATE: 100 BPM | RESPIRATION RATE: 17 BRPM | SYSTOLIC BLOOD PRESSURE: 122 MMHG | HEIGHT: 64 IN | DIASTOLIC BLOOD PRESSURE: 74 MMHG | TEMPERATURE: 98.6 F

## 2019-11-05 DIAGNOSIS — E78.2 MIXED HYPERLIPIDEMIA: ICD-10-CM

## 2019-11-05 DIAGNOSIS — Z13.1 SCREENING FOR DIABETES MELLITUS: ICD-10-CM

## 2019-11-05 DIAGNOSIS — Z00.00 PREVENTATIVE HEALTH CARE: Primary | ICD-10-CM

## 2019-11-05 DIAGNOSIS — K92.1 HEMATOCHEZIA: ICD-10-CM

## 2019-11-05 DIAGNOSIS — Z13.29 SCREENING FOR THYROID DISORDER: ICD-10-CM

## 2019-11-05 PROCEDURE — 99396 PREV VISIT EST AGE 40-64: CPT | Performed by: FAMILY MEDICINE

## 2019-11-05 NOTE — PROGRESS NOTES
Assessment/Plan:   1  Preventative health care  Patient appears clinically stable today  Reviewed her health maintenance measures with her  At this time, she has been following up regularly with Gastroenterology  She recently had an upper endoscopy over the past 2 years  She was informed that she did have colonic abnormalities and would need a repeat colonoscopy to further assess this problem  Due to this problem as well, she states that she has had periodic episodes hematochezia  Will refer patient to Colorectal surgery for further evaluation  Review need for immunizations  She refuses all these agents today  Will check A1c,, TSH  She is encouraged to continue with a strict diet and exercise plan  Continue with regular follow-up with Neurology  2  BMI 34 0-34 9,adult  See BMI section       Diagnoses and all orders for this visit:    Preventative health care    Hematochezia  -     Ambulatory referral to Colorectal Surgery; Future    Mixed hyperlipidemia  -     Lipid Panel with Direct LDL reflex; Future    Screening for diabetes mellitus  -     Hemoglobin A1C; Future    Screening for thyroid disorder  -     TSH, 3rd generation with Free T4 reflex; Future    BMI 34 0-34 9,adult          Subjective:    Chief Complaint   Patient presents with    Follow-up        Patient ID: Sveta Pope is a 47 y o  female  Sveta Pope presents for health maintenance visit   47 y o   female    He/she states that  level of health is:  [address]    Dental issues :no    Vision issues: no    Hearing issues: no    Up-to-date on immunizations: no, denies influenza    Diet: fair     Exercise:  intermittently    Tobacco: no    ETOH: Minimal     Illegal drugs: no          Review of Systems   Constitutional: Negative for activity change, chills, fatigue and fever  HENT: Negative for congestion, ear pain, sinus pressure and sore throat  Eyes: Negative for redness, itching and visual disturbance     Respiratory: Negative for cough and shortness of breath  Cardiovascular: Negative for chest pain and palpitations  Gastrointestinal: Negative for abdominal pain, diarrhea and nausea  Endocrine: Negative for cold intolerance and heat intolerance  Genitourinary: Negative for dysuria, flank pain and frequency  Musculoskeletal: Negative for arthralgias, back pain, gait problem and myalgias  Skin: Negative for color change  Allergic/Immunologic: Negative for environmental allergies  Neurological: Negative for dizziness, numbness and headaches  Psychiatric/Behavioral: Negative for behavioral problems and sleep disturbance  The following portions of the patient's history were reviewed and updated as appropriate : past family history, past medical history, past social history and past surgical history  No current outpatient medications on file  Objective:    Vitals:    11/05/19 1647   BP: 122/74   BP Location: Left arm   Patient Position: Sitting   Cuff Size: Large   Pulse: 100   Resp: 17   Temp: 98 6 °F (37 °C)   TempSrc: Tympanic   SpO2: 98%   Weight: 90 2 kg (198 lb 12 8 oz)   Height: 5' 4" (1 626 m)        Physical Exam   Constitutional: She is oriented to person, place, and time  She appears well-developed and well-nourished  HENT:   Head: Normocephalic and atraumatic  Nose: Nose normal    Mouth/Throat: No oropharyngeal exudate  Eyes: Pupils are equal, round, and reactive to light  Right eye exhibits no discharge  Left eye exhibits no discharge  Neck: Normal range of motion  Neck supple  No tracheal deviation present  Cardiovascular: Normal rate, regular rhythm and intact distal pulses  Exam reveals no gallop and no friction rub  No murmur heard  Pulses:       Dorsalis pedis pulses are 2+ on the right side, and 2+ on the left side  Posterior tibial pulses are 2+ on the right side, and 2+ on the left side  Pulmonary/Chest: Effort normal and breath sounds normal  No respiratory distress   She has no wheezes  She has no rales  Abdominal: Soft  Bowel sounds are normal  She exhibits no distension  There is no tenderness  There is no rebound and no guarding  Musculoskeletal: Normal range of motion  She exhibits no edema  Lymphadenopathy:        Head (right side): No submental and no submandibular adenopathy present  Head (left side): No submental and no submandibular adenopathy present  She has no cervical adenopathy  Right cervical: No superficial cervical, no deep cervical and no posterior cervical adenopathy present  Left cervical: No superficial cervical, no deep cervical and no posterior cervical adenopathy present  Neurological: She is alert and oriented to person, place, and time  No cranial nerve deficit or sensory deficit  Skin: Skin is warm, dry and intact  Psychiatric: Her speech is normal and behavior is normal  Judgment normal  Her mood appears not anxious  Cognition and memory are normal  She does not exhibit a depressed mood  Vitals reviewed  BMI Counseling: Body mass index is 34 12 kg/m²  The BMI is above normal  Nutrition recommendations include reducing portion sizes and decreasing overall calorie intake  Exercise recommendations include moderate aerobic physical activity for 150 minutes/week

## 2019-11-05 NOTE — PATIENT INSTRUCTIONS
Obesity   AMBULATORY CARE:   Obesity  is when your body mass index (BMI) is greater than 30  Your healthcare provider will use your height and weight to measure your BMI  The risks of obesity include  many health problems, such as injuries or physical disability  You may need tests to check for the following:  · Diabetes     · High blood pressure or high cholesterol     · Heart disease     · Gallbladder or liver disease     · Cancer of the colon, breast, prostate, liver, or kidney     · Sleep apnea     · Arthritis or gout  Seek care immediately if:   · You have a severe headache, confusion, or difficulty speaking  · You have weakness on one side of your body  · You have chest pain, sweating, or shortness of breath  Contact your healthcare provider if:   · You have symptoms of gallbladder or liver disease, such as pain in your upper abdomen  · You have knee or hip pain and discomfort while walking  · You have symptoms of diabetes, such as intense hunger and thirst, and frequent urination  · You have symptoms of sleep apnea, such as snoring or daytime sleepiness  · You have questions or concerns about your condition or care  Treatment for obesity  focuses on helping you lose weight to improve your health  Even a small decrease in BMI can reduce the risk for many health problems  Your healthcare provider will help you set a weight-loss goal   · Lifestyle changes  are the first step in treating obesity  These include making healthy food choices and getting regular physical activity  Your healthcare provider may suggest a weight-loss program that involves coaching, education, and therapy  · Medicine  may help you lose weight when it is used with a healthy diet and physical activity  · Surgery  can help you lose weight if you are very obese and have other health problems  There are several types of weight-loss surgery  Ask your healthcare provider for more information    Be successful losing weight:   · Set small, realistic goals  An example of a small goal is to walk for 20 minutes 5 days a week  Anther goal is to lose 5% of your body weight  · Tell friends, family members, and coworkers about your goals  and ask for their support  Ask a friend to lose weight with you, or join a weight-loss support group  · Identify foods or triggers that may cause you to overeat , and find ways to avoid them  Remove tempting high-calorie foods from your home and workplace  Place a bowl of fresh fruit on your kitchen counter  If stress causes you to eat, then find other ways to cope with stress  · Keep a diary to track what you eat and drink  Also write down how many minutes of physical activity you do each day  Weigh yourself once a week and record it in your diary  Eating changes: You will need to eat 500 to 1,000 fewer calories each day than you currently eat to lose 1 to 2 pounds a week  The following changes will help you cut calories:  · Eat smaller portions  Use small plates, no larger than 9 inches in diameter  Fill your plate half full of fruits and vegetables  Measure your food using measuring cups until you know what a serving size looks like  · Eat 3 meals and 1 or 2 snacks each day  Plan your meals in advance  Iggy Hayes and eat at home most of the time  Eat slowly  · Eat fruits and vegetables at every meal   They are low in calories and high in fiber, which makes you feel full  Do not add butter, margarine, or cream sauce to vegetables  Use herbs to season steamed vegetables  · Eat less fat and fewer fried foods  Eat more baked or grilled chicken and fish  These protein sources are lower in calories and fat than red meat  Limit fast food  Dress your salads with olive oil and vinegar instead of bottled dressing  · Limit the amount of sugar you eat  Do not drink sugary beverages  Limit alcohol  Activity changes:  Physical activity is good for your body in many ways   It helps you burn calories and build strong muscles  It decreases stress and depression, and improves your mood  It can also help you sleep better  Talk to your healthcare provider before you begin an exercise program   · Exercise for at least 30 minutes 5 days a week  Start slowly  Set aside time each day for physical activity that you enjoy and that is convenient for you  It is best to do both weight training and an activity that increases your heart rate, such as walking, bicycling, or swimming  · Find ways to be more active  Do yard work and housecleaning  Walk up the stairs instead of using elevators  Spend your leisure time going to events that require walking, such as outdoor festivals or fairs  This extra physical activity can help you lose weight and keep it off  Follow up with your healthcare provider as directed: You may need to meet with a dietitian  Write down your questions so you remember to ask them during your visits  © 2017 [employer] Information is for End User's use only and may not be sold, redistributed or otherwise used for commercial purposes  All illustrations and images included in CareNotes® are the copyrighted property of [employer]  or Morgan Valenzuela  The above information is an  only  It is not intended as medical advice for individual conditions or treatments  Talk to your doctor, nurse or pharmacist before following any medical regimen to see if it is safe and effective for you  Weight Management   AMBULATORY CARE:   Why it is important to manage your weight:  Being overweight increases your risk of health conditions such as heart disease, high blood pressure, type 2 diabetes, and certain types of cancer  It can also increase your risk for osteoarthritis, sleep apnea, and other respiratory problems  Aim for a slow, steady weight loss  Even a small amount of weight loss can lower your risk of health problems    How to lose weight safely:  A safe and healthy way to lose weight is to eat fewer calories and get regular exercise  You can lose up about 1 pound a week by decreasing the number of calories you eat by 500 calories each day  You can decrease calories by eating smaller portion sizes or by cutting out high-calorie foods  Read labels to find out how many calories are in the foods you eat  You can also burn calories with exercise such as walking, swimming, or biking  You will be more likely to keep weight off if you make these changes part of your lifestyle  Healthy meal plan for weight management:  A healthy meal plan includes a variety of foods, contains fewer calories, and helps you stay healthy  A healthy meal plan includes the following:  · Eat whole-grain foods more often  A healthy meal plan should contain fiber  Fiber is the part of grains, fruits, and vegetables that is not broken down by your body  Whole-grain foods are healthy and provide extra fiber in your diet  Some examples of whole-grain foods are whole-wheat breads and pastas, oatmeal, brown rice, and bulgur  · Eat a variety of vegetables every day  Include dark, leafy greens such as spinach, kale, issa greens, and mustard greens  Eat yellow and orange vegetables such as carrots, sweet potatoes, and winter squash  · Eat a variety of fruits every day  Choose fresh or canned fruit (canned in its own juice or light syrup) instead of juice  Fruit juice has very little or no fiber  · Eat low-fat dairy foods  Drink fat-free (skim) milk or 1% milk  Eat fat-free yogurt and low-fat cottage cheese  Try low-fat cheeses such as mozzarella and other reduced-fat cheeses  · Choose meat and other protein foods that are low in fat  Choose beans or other legumes such as split peas or lentils  Choose fish, skinless poultry (chicken or turkey), or lean cuts of red meat (beef or pork)  Before you cook meat or poultry, cut off any visible fat  · Use less fat and oil  Try baking foods instead of frying them  Add less fat, such as margarine, sour cream, regular salad dressing and mayonnaise to foods  Eat fewer high-fat foods  Some examples of high-fat foods include french fries, doughnuts, ice cream, and cakes  · Eat fewer sweets  Limit foods and drinks that are high in sugar  This includes candy, cookies, regular soda, and sweetened drinks  Ways to decrease calories:   · Eat smaller portions  ¨ Use a small plate with smaller servings  ¨ Do not eat second helpings  ¨ When you eat at a restaurant, ask for a box and place half of your meal in the box before you eat  ¨ Share an entrée with someone else  · Replace high-calorie snacks with healthy, low-calorie snacks  ¨ Choose fresh fruit, vegetables, fat-free rice cakes, or air-popped popcorn instead of potato chips, nuts, or chocolate  ¨ Choose water or calorie-free drinks instead of soda or sweetened drinks  · Eat regular meals  Skipping meals can lead to overeating later in the day  Eat a healthy snack in place of a meal if you do not have time to eat a regular meal      · Do not shop for groceries when you are hungry  You may be more likely to make unhealthy food choices  Take a grocery list of healthy foods and shop after you have eaten  Exercise:  Exercise at least 30 minutes per day on most days of the week  Some examples of exercise include walking, biking, dancing, and swimming  You can also fit in more physical activity by taking the stairs instead of the elevator or parking farther away from stores  Ask your healthcare provider about the best exercise plan for you  Other things to consider as you try to lose weight:   · Be aware of situations that may give you the urge to overeat, such as eating while watching television  Find ways to avoid these situations  For example, read a book, go for a walk, or do crafts      · Meet with a weight loss support group or friends who are also trying to lose weight  This may help you stay motivated to continue working on your weight loss goals  © 2017 [employer] Information is for End User's use only and may not be sold, redistributed or otherwise used for commercial purposes  All illustrations and images included in CareNotes® are the copyrighted property of A [employer]  or Morgan Valenzuela  The above information is an  only  It is not intended as medical advice for individual conditions or treatments  Talk to your doctor, nurse or pharmacist before following any medical regimen to see if it is safe and effective for you

## 2019-12-23 ENCOUNTER — TELEPHONE (OUTPATIENT)
Dept: NEUROLOGY | [place] CLINIC | Age: 54
End: 2019-12-23

## 2020-01-21 ENCOUNTER — TELEPHONE (OUTPATIENT)
Dept: FAMILY MEDICINE CLINIC | [place] CLINIC | Age: 55
End: 2020-01-21

## 2020-02-28 ENCOUNTER — HOSPITAL ENCOUNTER (EMERGENCY)
[place] HOSPITAL | Age: 55
Discharge: HOME | End: 2020-02-28
Attending: EMERGENCY MEDICINE
Payer: COMMERCIAL

## 2020-02-28 VITALS
SYSTOLIC BLOOD PRESSURE: 113 MMHG | TEMPERATURE: 100.1 F | DIASTOLIC BLOOD PRESSURE: 69 MMHG | OXYGEN SATURATION: 95 % | HEART RATE: 99 BPM | RESPIRATION RATE: 18 BRPM

## 2020-02-28 DIAGNOSIS — J10.1 INFLUENZA A: Primary | ICD-10-CM

## 2020-02-28 LAB
ANION GAP SERPL CALCULATED.3IONS-SCNC: 9 MMOL/L (ref 4–13)
BASOPHILS # BLD AUTO: 0 THOUSANDS/ΜL (ref 0–0.1)
BASOPHILS NFR BLD AUTO: 0 % (ref 0–1)
BUN SERPL-MCNC: 16 MG/DL (ref 5–25)
CALCIUM SERPL-MCNC: 9.1 MG/DL (ref 8.3–10.1)
CHLORIDE SERPL-SCNC: 101 MMOL/L (ref 100–108)
CO2 SERPL-SCNC: 30 MMOL/L (ref 21–32)
CREAT SERPL-MCNC: 1 MG/DL (ref 0.6–1.3)
EOSINOPHIL # BLD AUTO: 0 THOUSAND/ΜL (ref 0–0.61)
EOSINOPHIL NFR BLD AUTO: 0 % (ref 0–6)
ERYTHROCYTE [DISTWIDTH] IN BLOOD BY AUTOMATED COUNT: 13.2 % (ref 11.6–15.1)
FLUAV RNA NPH QL NAA+PROBE: DETECTED
FLUBV RNA NPH QL NAA+PROBE: ABNORMAL
GFR SERPL CREATININE-BSD FRML MDRD: 64 ML/MIN/1.73SQ M
GLUCOSE SERPL-MCNC: 119 MG/DL (ref 65–140)
HCT VFR BLD AUTO: 39.9 % (ref 34.8–46.1)
HGB BLD-MCNC: 12.8 G/DL (ref 11.5–15.4)
IMM GRANULOCYTES # BLD AUTO: 0.01 THOUSAND/UL (ref 0–0.2)
IMM GRANULOCYTES NFR BLD AUTO: 0 % (ref 0–2)
LYMPHOCYTES # BLD AUTO: 0.6 THOUSANDS/ΜL (ref 0.6–4.47)
LYMPHOCYTES NFR BLD AUTO: 18 % (ref 14–44)
MCH RBC QN AUTO: 29.2 PG (ref 26.8–34.3)
MCHC RBC AUTO-ENTMCNC: 32.1 G/DL (ref 31.4–37.4)
MCV RBC AUTO: 91 FL (ref 82–98)
MONOCYTES # BLD AUTO: 0.47 THOUSAND/ΜL (ref 0.17–1.22)
MONOCYTES NFR BLD AUTO: 14 % (ref 4–12)
NEUTROPHILS # BLD AUTO: 2.33 THOUSANDS/ΜL (ref 1.85–7.62)
NEUTS SEG NFR BLD AUTO: 68 % (ref 43–75)
NRBC BLD AUTO-RTO: 0 /100 WBCS
PLATELET # BLD AUTO: 181 THOUSANDS/UL (ref 149–390)
PMV BLD AUTO: 9.9 FL (ref 8.9–12.7)
POTASSIUM SERPL-SCNC: 4.2 MMOL/L (ref 3.5–5.3)
RBC # BLD AUTO: 4.39 MILLION/UL (ref 3.81–5.12)
RSV RNA NPH QL NAA+PROBE: ABNORMAL
S PYO DNA THROAT QL NAA+PROBE: NORMAL
SODIUM SERPL-SCNC: 140 MMOL/L (ref 136–145)
WBC # BLD AUTO: 3.41 THOUSAND/UL (ref 4.31–10.16)

## 2020-02-28 PROCEDURE — 87651 STREP A DNA AMP PROBE: CPT | Performed by: NURSE PRACTITIONER

## 2020-02-28 PROCEDURE — 85025 COMPLETE CBC W/AUTO DIFF WBC: CPT | Performed by: NURSE PRACTITIONER

## 2020-02-28 PROCEDURE — 96360 HYDRATION IV INFUSION INIT: CPT

## 2020-02-28 PROCEDURE — 99284 EMERGENCY DEPT VISIT MOD MDM: CPT | Performed by: NURSE PRACTITIONER

## 2020-02-28 PROCEDURE — 36415 COLL VENOUS BLD VENIPUNCTURE: CPT | Performed by: NURSE PRACTITIONER

## 2020-02-28 PROCEDURE — 87631 RESP VIRUS 3-5 TARGETS: CPT | Performed by: NURSE PRACTITIONER

## 2020-02-28 PROCEDURE — 99284 EMERGENCY DEPT VISIT MOD MDM: CPT

## 2020-02-28 PROCEDURE — 80048 BASIC METABOLIC PNL TOTAL CA: CPT | Performed by: NURSE PRACTITIONER

## 2020-02-28 RX ORDER — ACETAMINOPHEN 325 MG/1
975 TABLET ORAL ONCE
Status: COMPLETED | OUTPATIENT
Start: 2020-02-28 | End: 2020-02-28

## 2020-02-28 RX ADMIN — SODIUM CHLORIDE 1000 ML: 0.9 INJECTION, SOLUTION INTRAVENOUS at 12:59

## 2020-02-28 RX ADMIN — ACETAMINOPHEN 975 MG: 325 TABLET ORAL at 13:04

## 2020-02-28 NOTE — ED PROVIDER NOTES
History  Chief Complaint   Patient presents with    Chills     Began 2 days ago, decreased PO intake  +nausea Pt states "everything hurts " "All of my holes stink "    Abdominal Pain     LLQ, began 2 days ago  This is a 80-year-old female coming in with flu-like symptoms for the past 2 days  Specifically she complains of chills, aches, headache, nonproductive cough, lack of appetite, sore throat, congestion, clear rhinorrhea, halitosis  She describes the headache as a pressure primarily located in the frontal and temporal aspects of her head  She took Tylenol yesterday with minimal relief  She states that she has been unable to eat because she gags/cough every time she swallows  She denies dizziness, lightheadedness, earache, nausea, vomiting, diarrhea, abdominal pain  None       Past Medical History:   Diagnosis Date    Head injury     Knee injury     right knee       Past Surgical History:   Procedure Laterality Date    LAPAROSCOPY      Exploratory    DC ESOPHAGOGASTRODUODENOSCOPY TRANSORAL DIAGNOSTIC N/A 2018    Procedure: ESOPHAGOGASTRODUODENOSCOPY (EGD); Surgeon: Jurgen Bailon MD;  Location: [facility]; Service: Gastroenterology       Family History   Problem Relation Age of Onset    Diabetes Mother     Hypertension Mother     Heart disease Mother      I have reviewed and agree with the history as documented  E-Cigarette/Vaping     E-Cigarette/Vaping Substances     Social History     Tobacco Use    Smoking status: Former Smoker     Last attempt to quit: 1990     Years since quittin     Smokeless tobacco: Never Used   Substance Use Topics    Alcohol use: Yes     Comment: socsial    Drug use: No       Review of Systems   Constitutional: Positive for activity change, appetite change, chills, diaphoresis and fatigue  Negative for fever and unexpected weight change  States that she feels achy throughout     HENT: Positive for congestion, rhinorrhea, sinus pressure, sore throat and trouble swallowing  Negative for drooling, ear discharge, ear pain, facial swelling and sinus pain  Respiratory: Positive for cough  Negative for chest tightness, shortness of breath and wheezing  Dry nonproductive cough  Cardiovascular: Negative for chest pain, palpitations and leg swelling  Gastrointestinal: Negative for abdominal distention, abdominal pain, anal bleeding, blood in stool, constipation, diarrhea, nausea, rectal pain and vomiting  Endocrine: Negative  Genitourinary: Negative for decreased urine volume, difficulty urinating, dysuria, frequency, pelvic pain, urgency, vaginal bleeding, vaginal discharge and vaginal pain  Foul-smelling/concentrated urine  Musculoskeletal: Positive for arthralgias  Negative for joint swelling, myalgias and neck stiffness  States everything hurts  Skin: Negative  Neurological: Negative for dizziness, syncope, weakness, light-headedness and numbness  Hematological: Negative  Psychiatric/Behavioral: Negative  Physical Exam  Physical Exam   Constitutional: She is oriented to person, place, and time  She appears well-developed  She appears distressed  HENT:   Head: Normocephalic and atraumatic  Head is without Catalan's sign and without contusion  Right Ear: Hearing, tympanic membrane, external ear and ear canal normal    Left Ear: Hearing, tympanic membrane, external ear and ear canal normal    Nose: No rhinorrhea or sinus tenderness  Right sinus exhibits maxillary sinus tenderness and frontal sinus tenderness  Left sinus exhibits maxillary sinus tenderness and frontal sinus tenderness  Mouth/Throat: Uvula is midline and mucous membranes are normal  Posterior oropharyngeal erythema present  No oropharyngeal exudate, posterior oropharyngeal edema or tonsillar abscesses  Halitosis  Eyes: Pupils are equal, round, and reactive to light   Conjunctivae, EOM and lids are normal    Neck: Trachea normal, normal range of motion and full passive range of motion without pain  Neck supple  Cardiovascular: Regular rhythm, normal heart sounds, intact distal pulses and normal pulses  Tachycardia present  Pulmonary/Chest: Effort normal  No accessory muscle usage  No tachypnea  No respiratory distress  She has no decreased breath sounds  She has no wheezes  She has no rhonchi  She has no rales  Abdominal: Soft  Normal appearance and bowel sounds are normal  She exhibits no distension and no mass  There is no hepatosplenomegaly  There is no tenderness  There is no rigidity, no rebound, no guarding, no CVA tenderness, no tenderness at McBurney's point and negative Ruggiero's sign  Lymphadenopathy:        Head (right side): No submental, no submandibular and no tonsillar adenopathy present  Head (left side): No submental, no submandibular and no tonsillar adenopathy present  She has no cervical adenopathy  Neurological: She is alert and oriented to person, place, and time  She has normal strength  No sensory deficit  GCS eye subscore is 4  GCS verbal subscore is 5  GCS motor subscore is 6  Skin: Skin is warm and dry  Capillary refill takes less than 2 seconds  Psychiatric: She has a normal mood and affect   Her speech is normal and behavior is normal  Judgment and thought content normal  Cognition and memory are normal        Vital Signs  ED Triage Vitals   Temperature Pulse Respirations Blood Pressure SpO2   02/28/20 1202 02/28/20 1202 02/28/20 1202 02/28/20 1202 02/28/20 1202   100 1 °F (37 8 °C) (!) 126 20 123/66 96 %      Temp Source Heart Rate Source Patient Position - Orthostatic VS BP Location FiO2 (%)   02/28/20 1202 02/28/20 1306 02/28/20 1202 02/28/20 1202 --   Oral Monitor Sitting Right arm       Pain Score       02/28/20 1202       Worst Possible Pain           Vitals:    02/28/20 1202 02/28/20 1306 02/28/20 1412   BP: 123/66 118/80 113/69   Pulse: (!) 126 (!) 108 99   Patient Position - Orthostatic VS: Sitting Lying Lying         Visual Acuity      ED Medications  Medications   sodium chloride 0 9 % bolus 1,000 mL (0 mL Intravenous Stopped 2/28/20 1411)   acetaminophen (TYLENOL) tablet 975 mg (975 mg Oral Given 2/28/20 1304)       Diagnostic Studies  Results Reviewed     Procedure Component Value Units Date/Time    Influenza A/B and RSV PCR [[id number]]  (Abnormal) Collected:  02/28/20 1300    Lab Status:  Final result Specimen:  Nasopharyngeal from Nose Updated:  02/28/20 1346     INFLUENZA A PCR Detected     INFLUENZA B PCR None Detected     RSV PCR None Detected    Strep A PCR [[id number]]  (Normal) Collected:  02/28/20 1300    Lab Status:  Final result Specimen:  Throat Updated:  02/28/20 1345     STREP A PCR None Detected    Basic metabolic panel [[id number]] Collected:  02/28/20 1258    Lab Status:  Final result Specimen:  Blood from Arm, Right Updated:  02/28/20 1321     Sodium 140 mmol/L      Potassium 4 2 mmol/L      Chloride 101 mmol/L      CO2 30 mmol/L      ANION GAP 9 mmol/L      BUN 16 mg/dL      Creatinine 1 00 mg/dL      Glucose 119 mg/dL      Calcium 9 1 mg/dL      eGFR 64 ml/min/1 73sq m     Narrative:       [place] guidelines for Chronic Kidney Disease (CKD):     Stage 1 with normal or high GFR (GFR > 90 mL/min/1 73 square meters)    Stage 2 Mild CKD (GFR = 60-89 mL/min/1 73 square meters)    Stage 3A Moderate CKD (GFR = 45-59 mL/min/1 73 square meters)    Stage 3B Moderate CKD (GFR = 30-44 mL/min/1 73 square meters)    Stage 4 Severe CKD (GFR = 15-29 mL/min/1 73 square meters)    Stage 5 End Stage CKD (GFR <15 mL/min/1 73 square meters)  Note: GFR calculation is accurate only with a steady state creatinine    CBC and differential [[id number]]  (Abnormal) Collected:  02/28/20 1258    Lab Status:  Final result Specimen:  Blood from Arm, Right Updated:  02/28/20 1305     WBC 3 41 Thousand/uL      RBC 4 39 Million/uL      Hemoglobin 12 8 g/dL Hematocrit 39 9 %      MCV 91 fL      MCH 29 2 pg      MCHC 32 1 g/dL      RDW 13 2 %      MPV 9 9 fL      Platelets 741 Thousands/uL      nRBC 0 /100 WBCs      Neutrophils Relative 68 %      Immat GRANS % 0 %      Lymphocytes Relative 18 %      Monocytes Relative 14 %      Eosinophils Relative 0 %      Basophils Relative 0 %      Neutrophils Absolute 2 33 Thousands/µL      Immature Grans Absolute 0 01 Thousand/uL      Lymphocytes Absolute 0 60 Thousands/µL      Monocytes Absolute 0 47 Thousand/µL      Eosinophils Absolute 0 00 Thousand/µL      Basophils Absolute 0 00 Thousands/µL     POCT urinalysis dipstick [[id number]]     Lab Status:  No result Specimen:  Urine                  No orders to display              Procedures  Procedures         ED Course                               MDM  Number of Diagnoses or Management Options  Influenza A: new and requires workup  Diagnosis management comments: This is a 51-year-old female coming in today complaining of fluid symptoms for 2 days  She has also had a sore throat and foul-smelling urine  She denies having any fevers but does not have a thermometer at home  I am concerned for flu, strep, viral illness  Though patient does have cough, lungs sound clear and cough is nonproductive  Suspicion for pneumonia is minimal   Will get flu PCR, strep PCR, UA, CBC, BMP; and re-evaluate patient  Patient was positive for influenza A  Discussed symptomatic treatment with patient  Follow-up with PCP  Return precautions discussed           Amount and/or Complexity of Data Reviewed  Clinical lab tests: ordered and reviewed    Patient Progress  Patient progress: improved        Disposition  Final diagnoses:   Influenza A     Time reflects when diagnosis was documented in both MDM as applicable and the Disposition within this note     Time User Action Codes Description Comment    2/28/2020  1:49 PM Negro Pickard Add [J10 1] Influenza A       ED Disposition     ED Disposition Condition Date/Time Comment    Discharge Stable Fri Feb 28, 2020  1:49 PM Barry Osceola discharge to home/self care  Follow-up Information     Follow up With Specialties Details Why Contact Info Additional Information    [place],  Family Medicine Schedule an appointment as soon as possible for a visit in 1 week  [facility] Emergency Medicine  As needed, If symptoms worsen [facility] [address]  [phone] [address] [facility], [address], [place], [address]          Discharge Medication List as of 2/28/2020  2:03 PM      START taking these medications    Details   phenol (Chloraseptic) 1 4 % mucosal liquid Apply 1 spray to the mouth or throat every 2 (two) hours as needed (Sore throat), Starting Fri 2/28/2020, Print           No discharge procedures on file      PDMP Review     None          ED Provider  Electronically Signed by           Vishal Rust  02/28/20 7403

## 2020-02-28 NOTE — DISCHARGE INSTRUCTIONS
As discussed, treatment is symptomatic care  Right control fevers with Tylenol and ibuprofen  Drink plenty of fluids and continue good nutrition    Plenty of rest

## 2020-02-28 NOTE — ED ATTENDING ATTESTATION
2/28/2020  Mahsa BATES DO, saw and evaluated the patient  I have discussed the patient with the resident/non-physician practitioner and agree with the resident's/non-physician practitioner's findings, Plan of Care, and MDM as documented in the resident's/non-physician practitioner's note, except where noted  All available labs and Radiology studies were reviewed  I was present for key portions of any procedure(s) performed by the resident/non-physician practitioner and I was immediately available to provide assistance  At this point I agree with the current assessment done in the Emergency Department  I have conducted an independent evaluation of this patient a history and physical is as follows:    ED Course         Critical Care Time  Procedures    55-year-old female presents with nonproductive cough, fevers, congestion and sore throat over the last 2 days  She states she can't eat or drink anything secondary to the sore throat  No chest pain or shortness of breath  On exam patient is awake and alert in no acute distress  Her voice is normal   She is handling her secretions  Mucous membranes are moist   No pharyngeal exudates noted  Heart is slightly tachycardic without murmur  Lungs are clear  Abdomen is soft and nontender  Skin is normal color without rash  Will give IV fluids, check basic labs  Will rule out influenza  Will rule out strep

## 2020-03-06 ENCOUNTER — HOSPITAL ENCOUNTER (EMERGENCY)
[place] HOSPITAL | Age: 55
Discharge: HOME | End: 2020-03-06
Attending: EMERGENCY MEDICINE | Admitting: EMERGENCY MEDICINE
Payer: COMMERCIAL

## 2020-03-06 VITALS
BODY MASS INDEX: 32.77 KG/M2 | OXYGEN SATURATION: 99 % | TEMPERATURE: 98.1 F | SYSTOLIC BLOOD PRESSURE: 154 MMHG | DIASTOLIC BLOOD PRESSURE: 71 MMHG | HEART RATE: 77 BPM | WEIGHT: 190.92 LBS | RESPIRATION RATE: 18 BRPM

## 2020-03-06 DIAGNOSIS — R19.7 DIARRHEA: ICD-10-CM

## 2020-03-06 DIAGNOSIS — R11.0 NAUSEA: Primary | ICD-10-CM

## 2020-03-06 LAB
BACTERIA UR QL AUTO: ABNORMAL /HPF
BILIRUB UR QL STRIP: NEGATIVE
CLARITY UR: CLEAR
COLOR UR: YELLOW
GLUCOSE UR STRIP-MCNC: NEGATIVE MG/DL
HGB UR QL STRIP.AUTO: ABNORMAL
KETONES UR STRIP-MCNC: NEGATIVE MG/DL
LEUKOCYTE ESTERASE UR QL STRIP: NEGATIVE
MUCOUS THREADS UR QL AUTO: ABNORMAL
NITRITE UR QL STRIP: NEGATIVE
NON-SQ EPI CELLS URNS QL MICRO: ABNORMAL /HPF
PH UR STRIP.AUTO: 5.5 [PH] (ref 4.5–8)
PROT UR STRIP-MCNC: NEGATIVE MG/DL
RBC #/AREA URNS AUTO: ABNORMAL /HPF
SP GR UR STRIP.AUTO: >=1.03 (ref 1–1.03)
UROBILINOGEN UR QL STRIP.AUTO: 0.2 E.U./DL
WBC #/AREA URNS AUTO: ABNORMAL /HPF

## 2020-03-06 PROCEDURE — 99283 EMERGENCY DEPT VISIT LOW MDM: CPT

## 2020-03-06 PROCEDURE — 81001 URINALYSIS AUTO W/SCOPE: CPT

## 2020-03-06 PROCEDURE — 99283 EMERGENCY DEPT VISIT LOW MDM: CPT | Performed by: EMERGENCY MEDICINE

## 2020-03-06 RX ORDER — ONDANSETRON 4 MG/1
4 TABLET, ORALLY DISINTEGRATING ORAL EVERY 6 HOURS PRN
Qty: 20 TABLET | Refills: 0 | Status: SHIPPED | OUTPATIENT
Start: 2020-03-06 | End: 2020-10-20

## 2020-03-06 RX ORDER — ONDANSETRON 4 MG/1
4 TABLET, ORALLY DISINTEGRATING ORAL ONCE
Status: COMPLETED | OUTPATIENT
Start: 2020-03-06 | End: 2020-03-06

## 2020-03-06 RX ORDER — LOPERAMIDE HYDROCHLORIDE 2 MG/1
2 CAPSULE ORAL ONCE
Status: COMPLETED | OUTPATIENT
Start: 2020-03-06 | End: 2020-03-06

## 2020-03-06 RX ADMIN — ONDANSETRON 4 MG: 4 TABLET, ORALLY DISINTEGRATING ORAL at 07:14

## 2020-03-06 RX ADMIN — LOPERAMIDE HYDROCHLORIDE 2 MG: 2 CAPSULE ORAL at 07:15

## 2020-03-06 NOTE — ED PROVIDER NOTES
Pt Name: Andrés Real  MRN: [id number]  [place] 1965  Age/Sex: 47 y o  female  Date of evaluation: 3/6/2020  PCP: Michael Valderrama DO    CHIEF COMPLAINT    Chief Complaint   Patient presents with    Nausea     Patient presents complaining of nausea, "gagging up clear stuff", loss of appetite and "hot sweats"  Also reports both urine and bowel incontinence  States "I don't know why but my body isn't telling me I have to pee or poop so I've just been going in my pants"  Recently diagnosed with influenza   Urinary Incontinence         HPI    Donna Hummel presents to the Emergency Department complaining of nausea, and diarrhea  She had the flu recently and was starting to feel better but she then developed diarrhea that she is having a hard time controlling  She does not have access to a bathroom easily at work and does not feel that she is ready to return  HPI      Past Medical and Surgical History    Past Medical History:   Diagnosis Date    Head injury     Knee injury     right knee       Past Surgical History:   Procedure Laterality Date    LAPAROSCOPY      Exploratory    [place] ESOPHAGOGASTRODUODENOSCOPY TRANSORAL DIAGNOSTIC N/A 2018    Procedure: ESOPHAGOGASTRODUODENOSCOPY (EGD); Surgeon: Wander Cordero MD;  Location: [facility]; Service: Gastroenterology       Family History   Problem Relation Age of Onset    Diabetes Mother     Hypertension Mother     Heart disease Mother        Social History     Tobacco Use    Smoking status: Former Smoker     Last attempt to quit:      Years since quittin 2    Smokeless tobacco: Never Used   Substance Use Topics    Alcohol use: Yes     Comment: socsial    Drug use: No              Allergies    Allergies   Allergen Reactions    Aspirin Anaphylaxis    Penicillin V Anaphylaxis    Penicillins        Home Medications    Prior to Admission medications    Medication Sig Start Date End Date Taking?  Authorizing Provider   phenol (Chloraseptic) 1 4 % mucosal liquid Apply 1 spray to the mouth or throat every 2 (two) hours as needed (Sore throat) 2/28/20   CAYDEN Valadez           Review of Systems    Review of Systems   Constitutional: Negative for activity change, appetite change, chills, diaphoresis, fatigue and fever  HENT: Negative for congestion, postnasal drip, rhinorrhea, sinus pressure, sneezing and sore throat  Eyes: Negative for pain and visual disturbance  Respiratory: Negative for cough, chest tightness and shortness of breath  Cardiovascular: Negative for chest pain, palpitations and leg swelling  Gastrointestinal: Positive for diarrhea, nausea and vomiting  Negative for abdominal distention, abdominal pain and constipation  Endocrine: Negative for polydipsia, polyphagia and polyuria  Genitourinary: Negative for decreased urine volume, difficulty urinating, dysuria, flank pain, frequency and hematuria  Musculoskeletal: Negative for arthralgias, gait problem, joint swelling and neck pain  Skin: Negative for pallor and rash  Allergic/Immunologic: Negative for immunocompromised state  Neurological: Negative for syncope, speech difficulty, weakness, light-headedness, numbness and headaches  All other systems reviewed and are negative  Physical Exam      ED Triage Vitals [03/06/20 0613]   Temperature Pulse Respirations Blood Pressure SpO2   98 1 °F (36 7 °C) 77 18 154/71 99 %      Temp Source Heart Rate Source Patient Position - Orthostatic VS BP Location FiO2 (%)   Oral Monitor Sitting Right arm --      Pain Score       4               Physical Exam   Constitutional: She is oriented to person, place, and time  She appears well-developed and well-nourished  No distress  HENT:   Head: Normocephalic and atraumatic  Nose: Nose normal    Mouth/Throat: Oropharynx is clear and moist    Eyes: Pupils are equal, round, and reactive to light   Conjunctivae, EOM and lids are normal    Neck: Normal range of motion  Neck supple  Cardiovascular: Normal rate, regular rhythm and normal heart sounds  Exam reveals no gallop and no friction rub  No murmur heard  Pulmonary/Chest: Effort normal and breath sounds normal  No accessory muscle usage  No respiratory distress  She has no wheezes  She has no rales  Abdominal: Soft  She exhibits no distension  There is no tenderness  There is no rebound and no guarding  Neurological: She is alert and oriented to person, place, and time  No cranial nerve deficit or sensory deficit  Skin: Skin is warm and dry  No rash noted  She is not diaphoretic  No erythema  Psychiatric: She has a normal mood and affect  Her speech is normal and behavior is normal  Judgment and thought content normal    Nursing note and vitals reviewed  Assessment and Plan    Ry Guerrier is a 47 y o  female who presents with nausea and diarrhea  Physical examination unremarkable  Differential diagnosis (not completely inclusive) includes viral syndrome  Plan will be to perform diagnostic testing and treat symptomatically        MDM    Diagnostic Results        Labs:    Results for orders placed or performed during the hospital encounter of 03/06/20   Urine Microscopic   Result Value Ref Range    RBC, UA None Seen None Seen, 0-5 /hpf    WBC, UA 2-4 (A) None Seen, 0-5, 5-55, 5-65 /hpf    Epithelial Cells Occasional None Seen, Occasional /hpf    Bacteria, UA Innumerable (A) None Seen, Occasional /hpf    MUCUS THREADS Moderate (A) None Seen   Urine Macroscopic, POC   Result Value Ref Range    Color, UA Yellow     Clarity, UA Clear     pH, UA 5 5 4 5 - 8 0    Leukocytes, UA Negative Negative    Nitrite, UA Negative Negative    Protein, UA Negative Negative mg/dl    Glucose, UA Negative Negative mg/dl    Ketones, UA Negative Negative mg/dl    Urobilinogen, UA 0 2 0 2, 1 0 E U /dl E U /dl    Bilirubin, UA Negative Negative    Blood, UA Trace (A) Negative    Specific Gravity, UA >=1 030 1 003 - 1 030       All labs reviewed and utilized in the medical decision making process    Radiology:    No orders to display       All radiology studies independently viewed by me and interpreted by the radiologist     Procedure    Procedures      ED Course of Care and Re-Assessments        Medications   ondansetron (ZOFRAN-ODT) dispersible tablet 4 mg (4 mg Oral Given 3/6/20 0714)   loperamide (IMODIUM) capsule 2 mg (2 mg Oral Given 3/6/20 0715)           FINAL IMPRESSION    Final diagnoses:   Nausea   Diarrhea         DISPOSITION/PLAN    Time reflects when diagnosis was documented in both MDM as applicable and the Disposition within this note     Time User Action Codes Description Comment    3/6/2020  7:28 AM Jacqulyne Gowers L Add [R11 0] Nausea     3/6/2020  7:29 AM Jacqulyne Gowers Add [R19 7] Diarrhea       ED Disposition     ED Disposition Condition Date/Time Comment    Discharge Stable Fri Mar 6, 2020  7:28 AM [place] discharge to home/self care              Follow-up Information     Follow up With Specialties Details Why Contact Info    Marek Galvez DO Family Medicine Schedule an appointment as soon as possible for a visit   Selene Crook [address] [place] [address]  [phone]              PATIENT REFERRED TO:    Marek Galvez DO  [address]  [address]  [phone]    Schedule an appointment as soon as possible for a visit         DISCHARGE MEDICATIONS:    Discharge Medication List as of 3/6/2020  7:29 AM      START taking these medications    Details   ondansetron (ZOFRAN-ODT) 4 mg disintegrating tablet Take 1 tablet (4 mg total) by mouth every 6 (six) hours as needed for nausea or vomiting, Starting Fri 3/6/2020, Print         CONTINUE these medications which have NOT CHANGED    Details   phenol (Chloraseptic) 1 4 % mucosal liquid Apply 1 spray to the mouth or throat every 2 (two) hours as needed (Sore throat), Starting Fri 2/28/2020, Print             No discharge procedures on file           Dinh Burk, [facility],   03/08/20 5312

## 2020-07-09 ENCOUNTER — TELEPHONE (OUTPATIENT)
Dept: FAMILY MEDICINE CLINIC | [place] CLINIC | Age: 55
End: 2020-07-09

## 2020-07-09 NOTE — TELEPHONE ENCOUNTER
Pt called and stated that she needs colonoscopy order placed again  Order states closed  Notify pt once placed

## 2020-07-10 DIAGNOSIS — Z12.11 SCREENING FOR COLON CANCER: Primary | ICD-10-CM

## 2020-07-22 ENCOUNTER — TELEPHONE (OUTPATIENT)
Dept: GASTROENTEROLOGY | [place] CLINIC | Age: 55
End: 2020-07-22

## 2020-07-22 NOTE — TELEPHONE ENCOUNTER
07/22/20  Screened by: Jean Jacques    Referring Provider Dr Tobey Castleman: If patient answers NO to medical questions, then schedule procedure  If patient answers YES to medical questions, then schedule office appointment  Previous Colonoscopy yes  Date and Facility of last colonoscopy? 5 years ago    Comments: Patient failed OA        Pre- Screening: There is no height or weight on file to calculate BMI  Has patient been referred for a routine screening Colonoscopy? yes  Is the patient between 39-70 years old? yes    SCHEDULING STAFF   If the patient is between 45yrs-49yrs, please advise patient to confirm benefits/coverage with their insurance company for a routine screening colonoscopy, some insurance carriers will only cover at [phone] or older   If the patient is over 76years old, please schedule an office visit   If the patient had a previous colonoscopy send to the procedure  before continuing    Have you been diagnosed with a bleeding disorder or anemia? no    Do you take NO    Have you been diagnosed with Diabetes or are you taking any   Diabetic medications? no    Do you have any of the following symptoms? Rectal bleeding and Constipation    Have you had a coronary or vascular stent within the last year? no    Have you had a heart attack or stroke in the last 6 months? no    Have you had intestinal surgery in the last 3 months? no    Do you have problems with: NO    Do you use: NO    Have you been hospitalized in the last Month? no    Have you had chest pain (angina) or breathing problems  (COPD) in the last 3 months? no    Do you have any difficulty walking up a flight of stairs? no    Have you had Kidney failure or insufficiency? no    Have you had heart valve surgery? no    Are you confined to a wheelchair?  no

## 2020-09-03 ENCOUNTER — OFFICE VISIT (OUTPATIENT)
Dept: GASTROENTEROLOGY | [place] AMBULARY SURGERY CENTER | Age: 55
End: 2020-09-03
Payer: COMMERCIAL

## 2020-09-03 ENCOUNTER — TELEPHONE (OUTPATIENT)
Dept: GASTROENTEROLOGY | [place] AMBULARY SURGERY CENTER | Age: 55
End: 2020-09-03

## 2020-09-03 VITALS — WEIGHT: 172.8 LBS | HEIGHT: 64 IN | TEMPERATURE: 97.6 F | BODY MASS INDEX: 29.5 KG/M2

## 2020-09-03 DIAGNOSIS — Z86.010 HISTORY OF COLON POLYPS: Primary | ICD-10-CM

## 2020-09-03 DIAGNOSIS — K59.00 CONSTIPATION, UNSPECIFIED CONSTIPATION TYPE: ICD-10-CM

## 2020-09-03 PROBLEM — Z86.0100 HISTORY OF COLON POLYPS: Status: ACTIVE | Noted: 2020-09-03

## 2020-09-03 PROCEDURE — 99214 OFFICE O/P EST MOD 30 MIN: CPT | Performed by: INTERNAL MEDICINE

## 2020-09-03 RX ORDER — SODIUM, POTASSIUM,MAG SULFATES 17.5-3.13G
2 SOLUTION, RECONSTITUTED, ORAL ORAL SEE ADMIN INSTRUCTIONS
Qty: 2 BOTTLE | Refills: 0 | Status: SHIPPED | OUTPATIENT
Start: 2020-09-03 | End: 2020-09-28 | Stop reason: ALTCHOICE

## 2020-09-03 NOTE — ASSESSMENT & PLAN NOTE
Symptoms appear to be most likely due to outlet obstruction from rectocele    -advised her to take MiraLax and stool softeners regularly    -will refer to Colorectal surgery after colonoscopy

## 2020-09-03 NOTE — PROGRESS NOTES
Follow Up -  [facility]  Leilani Black 1965 female         Chief Complaint:  For colonoscopy    HPI:  Ms Jennifer Goldsmith was referred for colonoscopy because of history of colon polyps on the previous colonoscopy in 2015  She was seen by Dr Samantha Fritz a couple of years ago because of some upper GI symptoms and had upper endoscopy  Denies any abdominal pain or heartburn  Good appetite, no recent weight loss  She has problems with bowel movements and has to push through her vagina  Denies any blood or mucus in the stool  REVIEW OF SYSTEMS: Review of Systems   Constitutional: Negative for activity change, appetite change, chills, diaphoresis, fatigue, fever and unexpected weight change  HENT: Negative for ear discharge, ear pain, facial swelling, hearing loss, nosebleeds, sore throat, tinnitus and voice change  Eyes: Negative for pain, discharge, redness, itching and visual disturbance  Respiratory: Negative for apnea, cough, chest tightness, shortness of breath and wheezing  Cardiovascular: Negative for chest pain and palpitations  Gastrointestinal:        As noted in HPI   Endocrine: Negative for cold intolerance, heat intolerance and polyuria  Genitourinary: Negative for difficulty urinating, dysuria, flank pain, hematuria and urgency  Musculoskeletal: Negative for arthralgias, back pain, gait problem, joint swelling and myalgias  Skin: Negative for rash and wound  Neurological: Negative for dizziness, tremors, seizures, speech difficulty, light-headedness, numbness and headaches  Hematological: Negative for adenopathy  Does not bruise/bleed easily  Psychiatric/Behavioral: Negative for agitation, behavioral problems and confusion  The patient is not nervous/anxious           Past Medical History:   Diagnosis Date    Colon polyp     Head injury     Knee injury     right knee      Past Surgical History:   Procedure Laterality Date    LAPAROSCOPY      Exploratory    NC ESOPHAGOGASTRODUODENOSCOPY TRANSORAL DIAGNOSTIC N/A 2018    Procedure: ESOPHAGOGASTRODUODENOSCOPY (EGD); Surgeon: Nelda Fierro MD;  Location: [facility];   Service: Gastroenterology     Social History     Socioeconomic History    Marital status:      Spouse name: Not on file    Number of children: Not on file    Years of education: Not on file    Highest education level: Not on file   Occupational History    Not on file   Social Needs    Financial resource strain: Not on file    Food insecurity     Worry: Not on file     Inability: Not on file    Transportation needs     Medical: Not on file     Non-medical: Not on file   Tobacco Use    Smoking status: Former Smoker     Last attempt to quit:      Years since quittin 6    Smokeless tobacco: Never Used   Substance and Sexual Activity    Alcohol use: Yes     Comment: socsial    Drug use: No    Sexual activity: Not on file   Lifestyle    Physical activity     Days per week: Not on file     Minutes per session: Not on file    Stress: Not on file   Relationships    Social connections     Talks on phone: Not on file     Gets together: Not on file     Attends Rastafari service: Not on file     Active member of club or organization: Not on file     Attends meetings of clubs or organizations: Not on file     Relationship status: Not on file    Intimate partner violence     Fear of current or ex partner: Not on file     Emotionally abused: Not on file     Physically abused: Not on file     Forced sexual activity: Not on file   Other Topics Concern    Not on file   Social History Narrative    Consume 1 cup of coffee per day    Uses safety equipment: seatbelt     Family History   Problem Relation Age of Onset    Diabetes Mother     Hypertension Mother     Heart disease Mother      Aspirin; Penicillin v; and Penicillins  Current Outpatient Medications   Medication Sig Dispense Refill    ondansetron (ZOFRAN-ODT) 4 mg disintegrating tablet Take 1 tablet (4 mg total) by mouth every 6 (six) hours as needed for nausea or vomiting 20 tablet 0    Na Sulfate-K Sulfate-Mg Sulf (Suprep Bowel Prep Kit) 17 5-3 13-1 6 GM/177ML SOLN Take 2 Bottles by mouth see administration instructions Please follow the instructions from the office 2 Bottle 0    phenol (Chloraseptic) 1 4 % mucosal liquid Apply 1 spray to the mouth or throat every 2 (two) hours as needed (Sore throat) (Patient not taking: Reported on 9/3/2020) 1 Bottle 0     No current facility-administered medications for this visit  Temperature 97 6 °F (36 4 °C), temperature source Temporal, height 5' 4" (1 626 m), weight 78 4 kg (172 lb 12 8 oz), last menstrual period 04/30/2019, not currently breastfeeding  PHYSICAL EXAM: Physical Exam  Constitutional:       Appearance: She is well-developed  HENT:      Head: Normocephalic and atraumatic  Nose: Nose normal    Eyes:      General: No scleral icterus  Right eye: No discharge  Left eye: No discharge  Conjunctiva/sclera: Conjunctivae normal    Neck:      Musculoskeletal: Neck supple  Thyroid: No thyromegaly  Vascular: No JVD  Trachea: No tracheal deviation  Cardiovascular:      Rate and Rhythm: Normal rate and regular rhythm  Heart sounds: Normal heart sounds  No murmur  No friction rub  No gallop  Pulmonary:      Effort: Pulmonary effort is normal  No respiratory distress  Breath sounds: Normal breath sounds  No wheezing or rales  Chest:      Chest wall: No tenderness  Abdominal:      General: Bowel sounds are normal  There is no distension  Palpations: Abdomen is soft  There is no mass  Tenderness: There is no abdominal tenderness  There is no guarding or rebound  Hernia: No hernia is present  Musculoskeletal:         General: No tenderness or deformity  Lymphadenopathy:      Cervical: No cervical adenopathy     Skin:     General: Skin is warm and dry       Findings: No erythema or rash  Neurological:      Mental Status: She is alert and oriented to person, place, and time  Psychiatric:         Behavior: Behavior normal          Thought Content: Thought content normal           Lab Results   Component Value Date    WBC 3 41 (L) 02/28/2020    HGB 12 8 02/28/2020    HCT 39 9 02/28/2020    MCV 91 02/28/2020     02/28/2020     Lab Results   Component Value Date    GLUCOSE 102 05/08/2014    CALCIUM 9 1 02/28/2020     05/08/2014    K 4 2 02/28/2020    CO2 30 02/28/2020     02/28/2020    BUN 16 02/28/2020    CREATININE 1 00 02/28/2020     Lab Results   Component Value Date    ALT 31 08/01/2019    AST 20 08/01/2019    ALKPHOS 74 08/01/2019     Lab Results   Component Value Date    INR 0 94 03/09/2018    PROTIME 12 6 03/09/2018       No results found  ASSESSMENT & PLAN:    History of colon polyps  Personal history of colon polyps- patient is at increased risk for colon cancer screening  Rule out colorectal lesions including polyps or malignancy     -Schedule for colonoscopy  -High-fiber diet     -Patient was given instructions about the colonoscopy prep     -Patient was explained about  the risks and benefits of the procedure  Risks including but not limited to bleeding, infection, perforation were explained in detail  Also explained about less than 100% sensitivity with the exam and other alternatives            Constipation  Symptoms appear to be most likely due to outlet obstruction from rectocele    -advised her to take MiraLax and stool softeners regularly    -will refer to Colorectal surgery after colonoscopy

## 2020-09-14 ENCOUNTER — ANESTHESIA EVENT (OUTPATIENT)
Dept: GASTROENTEROLOGY | [place] AMBULARY SURGERY CENTER | Age: 55
End: 2020-09-14

## 2020-09-28 ENCOUNTER — ANESTHESIA (OUTPATIENT)
Dept: GASTROENTEROLOGY | [place] AMBULARY SURGERY CENTER | Age: 55
End: 2020-09-28

## 2020-09-28 ENCOUNTER — HOSPITAL ENCOUNTER (OUTPATIENT)
Dept: GASTROENTEROLOGY | [place] AMBULARY SURGERY CENTER | Age: 55
Setting detail: OUTPATIENT SURGERY
Discharge: HOME | End: 2020-09-28
Attending: INTERNAL MEDICINE | Admitting: INTERNAL MEDICINE
Payer: COMMERCIAL

## 2020-09-28 VITALS — HEART RATE: 72 BPM

## 2020-09-28 VITALS
TEMPERATURE: 97.7 F | BODY MASS INDEX: 27.31 KG/M2 | WEIGHT: 160 LBS | OXYGEN SATURATION: 99 % | HEART RATE: 73 BPM | HEIGHT: 64 IN | RESPIRATION RATE: 16 BRPM | DIASTOLIC BLOOD PRESSURE: 88 MMHG | SYSTOLIC BLOOD PRESSURE: 134 MMHG

## 2020-09-28 DIAGNOSIS — Z86.010 HISTORY OF COLON POLYPS: ICD-10-CM

## 2020-09-28 DIAGNOSIS — K59.00 CONSTIPATION, UNSPECIFIED CONSTIPATION TYPE: ICD-10-CM

## 2020-09-28 LAB
EXT PREGNANCY TEST URINE: NEGATIVE
EXT. CONTROL: NORMAL

## 2020-09-28 PROCEDURE — G0105 COLORECTAL SCRN; HI RISK IND: HCPCS | Performed by: INTERNAL MEDICINE

## 2020-09-28 PROCEDURE — 81025 URINE PREGNANCY TEST: CPT | Performed by: INTERNAL MEDICINE

## 2020-09-28 RX ORDER — LIDOCAINE HYDROCHLORIDE 10 MG/ML
0.5 INJECTION, SOLUTION EPIDURAL; INFILTRATION; INTRACAUDAL; PERINEURAL ONCE AS NEEDED
Status: DISCONTINUED | OUTPATIENT
Start: 2020-09-28 | End: 2020-10-02 | Stop reason: HOSPADM

## 2020-09-28 RX ORDER — SODIUM CHLORIDE, SODIUM LACTATE, POTASSIUM CHLORIDE, CALCIUM CHLORIDE 600; 310; 30; 20 MG/100ML; MG/100ML; MG/100ML; MG/100ML
125 INJECTION, SOLUTION INTRAVENOUS CONTINUOUS
Status: DISCONTINUED | OUTPATIENT
Start: 2020-09-28 | End: 2020-10-02 | Stop reason: HOSPADM

## 2020-09-28 RX ORDER — PROPOFOL 10 MG/ML
INJECTION, EMULSION INTRAVENOUS AS NEEDED
Status: DISCONTINUED | OUTPATIENT
Start: 2020-09-28 | End: 2020-09-28

## 2020-09-28 RX ADMIN — PROPOFOL 80 MG: 10 INJECTION, EMULSION INTRAVENOUS at 13:23

## 2020-09-28 RX ADMIN — PROPOFOL 50 MG: 10 INJECTION, EMULSION INTRAVENOUS at 13:27

## 2020-09-28 RX ADMIN — PROPOFOL 50 MG: 10 INJECTION, EMULSION INTRAVENOUS at 13:31

## 2020-09-28 RX ADMIN — PROPOFOL 20 MG: 10 INJECTION, EMULSION INTRAVENOUS at 13:25

## 2020-09-28 RX ADMIN — SODIUM CHLORIDE, SODIUM LACTATE, POTASSIUM CHLORIDE, AND CALCIUM CHLORIDE: .6; .31; .03; .02 INJECTION, SOLUTION INTRAVENOUS at 13:15

## 2020-09-28 NOTE — ANESTHESIA POSTPROCEDURE EVALUATION
Post-Op Assessment Note    CV Status:  Stable  Pain Score: 0    Pain management: adequate     Mental Status:  Alert and awake   Hydration Status:  Euvolemic   PONV Controlled:  Controlled   Airway Patency:  Patent      Post Op Vitals Reviewed: Yes      Staff: CRNA         No complications documented      BP   102/64   Temp     Pulse  70   Resp   15   SpO2 100

## 2020-09-28 NOTE — ANESTHESIA PREPROCEDURE EVALUATION
Procedure:  COLONOSCOPY    Relevant Problems   ANESTHESIA (within normal limits)   (-) History of anesthesia complications      CARDIO   (+) Hyperlipidemia   (-) Chest pain   (-) ALCARAZ (dyspnea on exertion)      ENDO (within normal limits)      GI/HEPATIC   (+) Dysphagia   (+) Gastroesophageal reflux disease without esophagitis      /RENAL (within normal limits)      HEMATOLOGY   (+) Anemia      MUSCULOSKELETAL   (+) Low back pain      NEURO/PSYCH   (-) CVA (cerebral vascular accident) ([facility] [address] )   (-) Seizures ([facility] [address] )      PULMONARY   (-) Shortness of breath   (-) URI (upper respiratory infection)      TTE 2018:  LEFT VENTRICLE:  Systolic function was normal  Ejection fraction was estimated to be 60 %  There were no regional wall motion abnormalities      TRICUSPID VALVE:  There was mild regurgitation  Physical Exam    Airway    Mallampati score: I  TM Distance: >3 FB  Neck ROM: full     Dental       Cardiovascular      Pulmonary      Other Findings        Anesthesia Plan  ASA Score- 2     Anesthesia Type- IV sedation with anesthesia with ASA Monitors  Additional Monitors:   Airway Plan:           Plan Factors-    Chart reviewed  Induction- intravenous  Postoperative Plan-     Informed Consent- Anesthetic plan and risks discussed with patient  I personally reviewed this patient with the CRNA  Discussed and agreed on the Anesthesia Plan with the CRNA  Neha Mukherjee

## 2020-09-30 ENCOUNTER — TELEPHONE (OUTPATIENT)
Dept: GASTROENTEROLOGY | [place] AMBULARY SURGERY CENTER | Age: 55
End: 2020-09-30

## 2020-09-30 ENCOUNTER — HOSPITAL ENCOUNTER (OUTPATIENT)
[place] HOSPITAL | Age: 55
Setting detail: OBSERVATION
Discharge: HOME | End: 2020-10-01
Attending: EMERGENCY MEDICINE | Admitting: SURGERY
Payer: COMMERCIAL

## 2020-09-30 ENCOUNTER — APPOINTMENT (EMERGENCY)
Dept: CT IMAGING | [place] HOSPITAL | Age: 55
End: 2020-09-30
Payer: COMMERCIAL

## 2020-09-30 DIAGNOSIS — K59.04 CHRONIC IDIOPATHIC CONSTIPATION: ICD-10-CM

## 2020-09-30 DIAGNOSIS — R10.13 EPIGASTRIC PAIN: ICD-10-CM

## 2020-09-30 DIAGNOSIS — K22.81 ESOPHAGEAL POLYP: ICD-10-CM

## 2020-09-30 DIAGNOSIS — R10.30 LOWER ABDOMINAL PAIN: ICD-10-CM

## 2020-09-30 DIAGNOSIS — K56.1 INTUSSUSCEPTION OF SMALL BOWEL (HCC): Primary | ICD-10-CM

## 2020-09-30 DIAGNOSIS — R10.30 LOWER ABDOMINAL PAIN: Primary | ICD-10-CM

## 2020-09-30 DIAGNOSIS — R10.32 LEFT LOWER QUADRANT ABDOMINAL PAIN: ICD-10-CM

## 2020-09-30 LAB
ALBUMIN SERPL BCP-MCNC: 4.1 G/DL (ref 3.5–5)
ALP SERPL-CCNC: 68 U/L (ref 46–116)
ALT SERPL W P-5'-P-CCNC: 28 U/L (ref 12–78)
ANION GAP SERPL CALCULATED.3IONS-SCNC: 6 MMOL/L (ref 4–13)
AST SERPL W P-5'-P-CCNC: 21 U/L (ref 5–45)
BASOPHILS # BLD AUTO: 0.04 THOUSANDS/ΜL (ref 0–0.1)
BASOPHILS NFR BLD AUTO: 1 % (ref 0–1)
BILIRUB SERPL-MCNC: 0.57 MG/DL (ref 0.2–1)
BILIRUB UR QL STRIP: NEGATIVE
BUN SERPL-MCNC: 10 MG/DL (ref 5–25)
CALCIUM SERPL-MCNC: 9.2 MG/DL (ref 8.3–10.1)
CHLORIDE SERPL-SCNC: 102 MMOL/L (ref 100–108)
CLARITY UR: CLEAR
CO2 SERPL-SCNC: 31 MMOL/L (ref 21–32)
COLOR UR: YELLOW
COLOR, POC: YELLOW
CREAT SERPL-MCNC: 0.82 MG/DL (ref 0.6–1.3)
EOSINOPHIL # BLD AUTO: 0 THOUSAND/ΜL (ref 0–0.61)
EOSINOPHIL NFR BLD AUTO: 0 % (ref 0–6)
ERYTHROCYTE [DISTWIDTH] IN BLOOD BY AUTOMATED COUNT: 12.2 % (ref 11.6–15.1)
EXT PREG TEST URINE: NORMAL
EXT. CONTROL ED NAV: NORMAL
GFR SERPL CREATININE-BSD FRML MDRD: 81 ML/MIN/1.73SQ M
GLUCOSE SERPL-MCNC: 97 MG/DL (ref 65–140)
GLUCOSE UR STRIP-MCNC: NEGATIVE MG/DL
HCT VFR BLD AUTO: 40.8 % (ref 34.8–46.1)
HGB BLD-MCNC: 13.1 G/DL (ref 11.5–15.4)
HGB UR QL STRIP.AUTO: NEGATIVE
IMM GRANULOCYTES # BLD AUTO: 0.02 THOUSAND/UL (ref 0–0.2)
IMM GRANULOCYTES NFR BLD AUTO: 0 % (ref 0–2)
KETONES UR STRIP-MCNC: NEGATIVE MG/DL
LEUKOCYTE ESTERASE UR QL STRIP: NEGATIVE
LYMPHOCYTES # BLD AUTO: 2.03 THOUSANDS/ΜL (ref 0.6–4.47)
LYMPHOCYTES NFR BLD AUTO: 33 % (ref 14–44)
MCH RBC QN AUTO: 29.4 PG (ref 26.8–34.3)
MCHC RBC AUTO-ENTMCNC: 32.1 G/DL (ref 31.4–37.4)
MCV RBC AUTO: 92 FL (ref 82–98)
MONOCYTES # BLD AUTO: 0.38 THOUSAND/ΜL (ref 0.17–1.22)
MONOCYTES NFR BLD AUTO: 6 % (ref 4–12)
NEUTROPHILS # BLD AUTO: 3.77 THOUSANDS/ΜL (ref 1.85–7.62)
NEUTS SEG NFR BLD AUTO: 60 % (ref 43–75)
NITRITE UR QL STRIP: NEGATIVE
NRBC BLD AUTO-RTO: 0 /100 WBCS
PH UR STRIP.AUTO: 5.5 [PH] (ref 4.5–8)
PLATELET # BLD AUTO: 263 THOUSANDS/UL (ref 149–390)
PMV BLD AUTO: 9.9 FL (ref 8.9–12.7)
POTASSIUM SERPL-SCNC: 3.8 MMOL/L (ref 3.5–5.3)
PROT SERPL-MCNC: 7.4 G/DL (ref 6.4–8.2)
PROT UR STRIP-MCNC: NEGATIVE MG/DL
RBC # BLD AUTO: 4.45 MILLION/UL (ref 3.81–5.12)
SODIUM SERPL-SCNC: 139 MMOL/L (ref 136–145)
SP GR UR STRIP.AUTO: >=1.03 (ref 1–1.03)
UROBILINOGEN UR QL STRIP.AUTO: 0.2 E.U./DL
WBC # BLD AUTO: 6.24 THOUSAND/UL (ref 4.31–10.16)

## 2020-09-30 PROCEDURE — 96374 THER/PROPH/DIAG INJ IV PUSH: CPT

## 2020-09-30 PROCEDURE — 36415 COLL VENOUS BLD VENIPUNCTURE: CPT | Performed by: EMERGENCY MEDICINE

## 2020-09-30 PROCEDURE — 85025 COMPLETE CBC W/AUTO DIFF WBC: CPT | Performed by: EMERGENCY MEDICINE

## 2020-09-30 PROCEDURE — 74177 CT ABD & PELVIS W/CONTRAST: CPT

## 2020-09-30 PROCEDURE — 99285 EMERGENCY DEPT VISIT HI MDM: CPT | Performed by: EMERGENCY MEDICINE

## 2020-09-30 PROCEDURE — G1004 CDSM NDSC: HCPCS

## 2020-09-30 PROCEDURE — 99285 EMERGENCY DEPT VISIT HI MDM: CPT

## 2020-09-30 PROCEDURE — 81025 URINE PREGNANCY TEST: CPT | Performed by: EMERGENCY MEDICINE

## 2020-09-30 PROCEDURE — 96361 HYDRATE IV INFUSION ADD-ON: CPT

## 2020-09-30 PROCEDURE — 80053 COMPREHEN METABOLIC PANEL: CPT | Performed by: EMERGENCY MEDICINE

## 2020-09-30 PROCEDURE — NC001 PR NO CHARGE: Performed by: SURGERY

## 2020-09-30 PROCEDURE — 81003 URINALYSIS AUTO W/O SCOPE: CPT

## 2020-09-30 RX ORDER — ACETAMINOPHEN 325 MG/1
650 TABLET ORAL EVERY 6 HOURS PRN
Status: DISCONTINUED | OUTPATIENT
Start: 2020-09-30 | End: 2020-10-01 | Stop reason: HOSPADM

## 2020-09-30 RX ORDER — ONDANSETRON 2 MG/ML
4 INJECTION INTRAMUSCULAR; INTRAVENOUS EVERY 6 HOURS PRN
Status: DISCONTINUED | OUTPATIENT
Start: 2020-09-30 | End: 2020-10-01 | Stop reason: HOSPADM

## 2020-09-30 RX ORDER — KETOROLAC TROMETHAMINE 30 MG/ML
15 INJECTION, SOLUTION INTRAMUSCULAR; INTRAVENOUS ONCE
Status: COMPLETED | OUTPATIENT
Start: 2020-09-30 | End: 2020-09-30

## 2020-09-30 RX ORDER — SODIUM CHLORIDE 9 MG/ML
50 INJECTION, SOLUTION INTRAVENOUS CONTINUOUS
Status: DISCONTINUED | OUTPATIENT
Start: 2020-09-30 | End: 2020-10-01 | Stop reason: HOSPADM

## 2020-09-30 RX ORDER — OXYCODONE HYDROCHLORIDE AND ACETAMINOPHEN 5; 325 MG/1; MG/1
2 TABLET ORAL EVERY 4 HOURS PRN
Status: DISCONTINUED | OUTPATIENT
Start: 2020-09-30 | End: 2020-10-01

## 2020-09-30 RX ORDER — HEPARIN SODIUM 5000 [USP'U]/ML
5000 INJECTION, SOLUTION INTRAVENOUS; SUBCUTANEOUS EVERY 8 HOURS SCHEDULED
Status: DISCONTINUED | OUTPATIENT
Start: 2020-09-30 | End: 2020-10-01 | Stop reason: HOSPADM

## 2020-09-30 RX ORDER — OXYCODONE HYDROCHLORIDE AND ACETAMINOPHEN 5; 325 MG/1; MG/1
1 TABLET ORAL EVERY 4 HOURS PRN
Status: DISCONTINUED | OUTPATIENT
Start: 2020-09-30 | End: 2020-10-01 | Stop reason: HOSPADM

## 2020-09-30 RX ADMIN — SODIUM CHLORIDE 1000 ML: 0.9 INJECTION, SOLUTION INTRAVENOUS at 19:38

## 2020-09-30 RX ADMIN — IOHEXOL 100 ML: 350 INJECTION, SOLUTION INTRAVENOUS at 20:41

## 2020-09-30 RX ADMIN — KETOROLAC TROMETHAMINE 15 MG: 30 INJECTION, SOLUTION INTRAMUSCULAR at 19:43

## 2020-09-30 NOTE — TELEPHONE ENCOUNTER
Colonoscopy on Monday 9/28/20-normal    Patient called for abdominal pain that started after colonoscopy on Monday  States she was in bed all day yesterday  Pain subsides when laying down and increases to 10/10 with movement-"discomfort/ fells like a belt"  Abdomen is soft and denies fever or n/v   Passing small amount of flatus and pelvis area tender to touch  Patient is eating, but appetite decreased / increasing po fluids  Took Tylenol yesterday but is unable to confirm if it was effective  Recommended tylenol, gas x and ambulate to see if pain improves  Go to ER if pain severe 10/10, unable to pass flatus / hard abdomen  Tiger text sent to Dr John Guerin as per protocol

## 2020-09-30 NOTE — TELEPHONE ENCOUNTER
Discussed patient's symptoms with Dr Matthias To  Patient instructed to go to ER for evaluation  Patient is agreeable and will go to [facility] ER- order entered

## 2020-09-30 NOTE — TELEPHONE ENCOUNTER
Patients GI provider:  Varun Matta    Number to return call: (  [phone]    Reason for call: Pt calling with pain / soreness around her navel area, had colon done 9-28-20, wants to know if this is normal    Scheduled procedure/appointment date if applicable: Apt/procedure NA

## 2020-10-01 ENCOUNTER — APPOINTMENT (OUTPATIENT)
Dept: CT IMAGING | [place] HOSPITAL | Age: 55
End: 2020-10-01
Payer: COMMERCIAL

## 2020-10-01 VITALS
TEMPERATURE: 98.1 F | RESPIRATION RATE: 18 BRPM | BODY MASS INDEX: 29.02 KG/M2 | WEIGHT: 169.09 LBS | SYSTOLIC BLOOD PRESSURE: 133 MMHG | HEART RATE: 72 BPM | DIASTOLIC BLOOD PRESSURE: 85 MMHG | OXYGEN SATURATION: 98 %

## 2020-10-01 PROBLEM — R10.9 ABDOMINAL PAIN: Status: RESOLVED | Noted: 2020-10-01 | Resolved: 2020-10-01

## 2020-10-01 PROBLEM — R10.9 ABDOMINAL PAIN: Status: ACTIVE | Noted: 2020-10-01

## 2020-10-01 LAB
ANION GAP SERPL CALCULATED.3IONS-SCNC: 5 MMOL/L (ref 4–13)
BUN SERPL-MCNC: 8 MG/DL (ref 5–25)
CALCIUM SERPL-MCNC: 8.5 MG/DL (ref 8.3–10.1)
CHLORIDE SERPL-SCNC: 104 MMOL/L (ref 100–108)
CO2 SERPL-SCNC: 29 MMOL/L (ref 21–32)
CREAT SERPL-MCNC: 0.87 MG/DL (ref 0.6–1.3)
ERYTHROCYTE [DISTWIDTH] IN BLOOD BY AUTOMATED COUNT: 12.6 % (ref 11.6–15.1)
GFR SERPL CREATININE-BSD FRML MDRD: 75 ML/MIN/1.73SQ M
GLUCOSE SERPL-MCNC: 92 MG/DL (ref 65–140)
HCT VFR BLD AUTO: 37.2 % (ref 34.8–46.1)
HGB BLD-MCNC: 11.9 G/DL (ref 11.5–15.4)
MCH RBC QN AUTO: 29.5 PG (ref 26.8–34.3)
MCHC RBC AUTO-ENTMCNC: 32 G/DL (ref 31.4–37.4)
MCV RBC AUTO: 92 FL (ref 82–98)
PLATELET # BLD AUTO: 221 THOUSANDS/UL (ref 149–390)
PMV BLD AUTO: 9.8 FL (ref 8.9–12.7)
POTASSIUM SERPL-SCNC: 3.6 MMOL/L (ref 3.5–5.3)
RBC # BLD AUTO: 4.03 MILLION/UL (ref 3.81–5.12)
SODIUM SERPL-SCNC: 138 MMOL/L (ref 136–145)
WBC # BLD AUTO: 3.63 THOUSAND/UL (ref 4.31–10.16)

## 2020-10-01 PROCEDURE — 99235 HOSP IP/OBS SAME DATE MOD 70: CPT | Performed by: SURGERY

## 2020-10-01 PROCEDURE — 85027 COMPLETE CBC AUTOMATED: CPT | Performed by: SURGERY

## 2020-10-01 PROCEDURE — 74176 CT ABD & PELVIS W/O CONTRAST: CPT

## 2020-10-01 PROCEDURE — 80048 BASIC METABOLIC PNL TOTAL CA: CPT | Performed by: SURGERY

## 2020-10-01 PROCEDURE — 99214 OFFICE O/P EST MOD 30 MIN: CPT | Performed by: INTERNAL MEDICINE

## 2020-10-01 PROCEDURE — G1004 CDSM NDSC: HCPCS

## 2020-10-01 RX ORDER — DOCUSATE SODIUM 100 MG/1
100 CAPSULE, LIQUID FILLED ORAL 2 TIMES DAILY
Status: DISCONTINUED | OUTPATIENT
Start: 2020-10-01 | End: 2020-10-01 | Stop reason: HOSPADM

## 2020-10-01 RX ORDER — SIMETHICONE 180 MG
180 CAPSULE ORAL 2 TIMES DAILY PRN
Qty: 60 CAPSULE | Refills: 0 | Status: SHIPPED | OUTPATIENT
Start: 2020-10-01 | End: 2022-07-12

## 2020-10-01 RX ORDER — POTASSIUM CHLORIDE 14.9 MG/ML
20 INJECTION INTRAVENOUS ONCE
Status: COMPLETED | OUTPATIENT
Start: 2020-10-01 | End: 2020-10-01

## 2020-10-01 RX ORDER — PANTOPRAZOLE SODIUM 40 MG/1
40 TABLET, DELAYED RELEASE ORAL DAILY
Qty: 30 TABLET | Refills: 0 | Status: SHIPPED | OUTPATIENT
Start: 2020-10-01 | End: 2020-12-07 | Stop reason: SDUPTHER

## 2020-10-01 RX ORDER — DOCUSATE SODIUM 100 MG/1
100 CAPSULE, LIQUID FILLED ORAL 2 TIMES DAILY
Qty: 10 CAPSULE | Refills: 0 | Status: SHIPPED | OUTPATIENT
Start: 2020-10-01 | End: 2022-07-12

## 2020-10-01 RX ORDER — POTASSIUM CHLORIDE 29.8 MG/ML
40 INJECTION INTRAVENOUS ONCE
Status: DISCONTINUED | OUTPATIENT
Start: 2020-10-01 | End: 2020-10-01 | Stop reason: SDUPTHER

## 2020-10-01 RX ORDER — ACETAMINOPHEN 325 MG/1
650 TABLET ORAL EVERY 6 HOURS PRN
Qty: 30 TABLET | Refills: 0 | Status: SHIPPED | OUTPATIENT
Start: 2020-10-01 | End: 2022-07-12

## 2020-10-01 RX ORDER — DICYCLOMINE HYDROCHLORIDE 10 MG/1
10 CAPSULE ORAL
Qty: 120 CAPSULE | Refills: 0 | Status: SHIPPED | OUTPATIENT
Start: 2020-10-01 | End: 2020-12-07 | Stop reason: SDUPTHER

## 2020-10-01 RX ORDER — POLYETHYLENE GLYCOL 3350 17 G/17G
17 POWDER, FOR SOLUTION ORAL DAILY
Qty: 30 EACH | Refills: 0 | Status: SHIPPED | OUTPATIENT
Start: 2020-10-01 | End: 2022-08-04 | Stop reason: SDUPTHER

## 2020-10-01 RX ADMIN — IOHEXOL 50 ML: 240 INJECTION, SOLUTION INTRATHECAL; INTRAVASCULAR; INTRAVENOUS; ORAL at 06:25

## 2020-10-01 RX ADMIN — DOCUSATE SODIUM 100 MG: 100 CAPSULE, LIQUID FILLED ORAL at 17:30

## 2020-10-01 RX ADMIN — DOCUSATE SODIUM 100 MG: 100 CAPSULE, LIQUID FILLED ORAL at 08:59

## 2020-10-01 RX ADMIN — OXYCODONE HYDROCHLORIDE AND ACETAMINOPHEN 1 TABLET: 5; 325 TABLET ORAL at 09:49

## 2020-10-01 RX ADMIN — POTASSIUM CHLORIDE 20 MEQ: 14.9 INJECTION, SOLUTION INTRAVENOUS at 11:18

## 2020-10-01 RX ADMIN — HEPARIN SODIUM 5000 UNITS: 5000 INJECTION INTRAVENOUS; SUBCUTANEOUS at 09:50

## 2020-10-01 RX ADMIN — SODIUM CHLORIDE 100 ML/HR: 0.9 INJECTION, SOLUTION INTRAVENOUS at 01:14

## 2020-10-01 RX ADMIN — SODIUM CHLORIDE 50 ML/HR: 0.9 INJECTION, SOLUTION INTRAVENOUS at 14:03

## 2020-10-01 RX ADMIN — POTASSIUM CHLORIDE 20 MEQ: 14.9 INJECTION, SOLUTION INTRAVENOUS at 09:05

## 2020-10-01 RX ADMIN — HEPARIN SODIUM 5000 UNITS: 5000 INJECTION INTRAVENOUS; SUBCUTANEOUS at 01:12

## 2020-10-01 RX ADMIN — OXYCODONE HYDROCHLORIDE AND ACETAMINOPHEN 2 TABLET: 5; 325 TABLET ORAL at 01:11

## 2020-10-01 NOTE — TELEPHONE ENCOUNTER
Thanks for the follow-up  I am glad that we sent her to the ER    I did not suspect anything related to the colonoscopy because it was a normal colonoscopy and she left hospital without any symptoms

## 2020-10-02 ENCOUNTER — TRANSITIONAL CARE MANAGEMENT (OUTPATIENT)
Dept: FAMILY MEDICINE CLINIC | [place] CLINIC | Age: 55
End: 2020-10-02

## 2020-10-05 ENCOUNTER — APPOINTMENT (OUTPATIENT)
Dept: LAB | Age: 55
End: 2020-10-05
Payer: COMMERCIAL

## 2020-10-05 ENCOUNTER — TRANSCRIBE ORDERS (OUTPATIENT)
Dept: ADMINISTRATIVE | Age: 55
End: 2020-10-05

## 2020-10-05 ENCOUNTER — TELEPHONE (OUTPATIENT)
Dept: FAMILY MEDICINE CLINIC | [place] CLINIC | Age: 55
End: 2020-10-05

## 2020-10-05 DIAGNOSIS — E55.9 VITAMIN D DEFICIENCY: ICD-10-CM

## 2020-10-05 DIAGNOSIS — D64.9 ANEMIA, UNSPECIFIED TYPE: ICD-10-CM

## 2020-10-05 DIAGNOSIS — Z13.1 SCREENING FOR DIABETES MELLITUS: ICD-10-CM

## 2020-10-05 DIAGNOSIS — Z13.1 SCREENING FOR DIABETES MELLITUS: Primary | ICD-10-CM

## 2020-10-05 DIAGNOSIS — E78.5 HYPERLIPIDEMIA, UNSPECIFIED HYPERLIPIDEMIA TYPE: ICD-10-CM

## 2020-10-05 DIAGNOSIS — E55.9 VITAMIN D DEFICIENCY: Primary | ICD-10-CM

## 2020-10-05 LAB
ERYTHROCYTE [DISTWIDTH] IN BLOOD BY AUTOMATED COUNT: 12.7 % (ref 11.6–15.1)
EST. AVERAGE GLUCOSE BLD GHB EST-MCNC: 103 MG/DL
HBA1C MFR BLD: 5.2 %
HCT VFR BLD AUTO: 42.9 % (ref 34.8–46.1)
HGB BLD-MCNC: 13.3 G/DL (ref 11.5–15.4)
MCH RBC QN AUTO: 28.9 PG (ref 26.8–34.3)
MCHC RBC AUTO-ENTMCNC: 31 G/DL (ref 31.4–37.4)
MCV RBC AUTO: 93 FL (ref 82–98)
PLATELET # BLD AUTO: 252 THOUSANDS/UL (ref 149–390)
PMV BLD AUTO: 11.2 FL (ref 8.9–12.7)
RBC # BLD AUTO: 4.6 MILLION/UL (ref 3.81–5.12)
WBC # BLD AUTO: 5.28 THOUSAND/UL (ref 4.31–10.16)

## 2020-10-05 PROCEDURE — 80053 COMPREHEN METABOLIC PANEL: CPT

## 2020-10-05 PROCEDURE — 36415 COLL VENOUS BLD VENIPUNCTURE: CPT

## 2020-10-05 PROCEDURE — 80061 LIPID PANEL: CPT

## 2020-10-05 PROCEDURE — 84443 ASSAY THYROID STIM HORMONE: CPT

## 2020-10-05 PROCEDURE — 83036 HEMOGLOBIN GLYCOSYLATED A1C: CPT

## 2020-10-05 PROCEDURE — 85027 COMPLETE CBC AUTOMATED: CPT

## 2020-10-06 LAB
ALBUMIN SERPL BCP-MCNC: 4.1 G/DL (ref 3.5–5)
ALP SERPL-CCNC: 80 U/L (ref 46–116)
ALT SERPL W P-5'-P-CCNC: 40 U/L (ref 12–78)
ANION GAP SERPL CALCULATED.3IONS-SCNC: 4 MMOL/L (ref 4–13)
AST SERPL W P-5'-P-CCNC: 27 U/L (ref 5–45)
BILIRUB SERPL-MCNC: 0.49 MG/DL (ref 0.2–1)
BUN SERPL-MCNC: 20 MG/DL (ref 5–25)
CALCIUM SERPL-MCNC: 9.5 MG/DL (ref 8.3–10.1)
CHLORIDE SERPL-SCNC: 113 MMOL/L (ref 100–108)
CHOLEST SERPL-MCNC: 204 MG/DL (ref 50–200)
CO2 SERPL-SCNC: 26 MMOL/L (ref 21–32)
CREAT SERPL-MCNC: 0.77 MG/DL (ref 0.6–1.3)
GFR SERPL CREATININE-BSD FRML MDRD: 87 ML/MIN/1.73SQ M
GLUCOSE P FAST SERPL-MCNC: 89 MG/DL (ref 65–99)
HDLC SERPL-MCNC: 43 MG/DL
LDLC SERPL CALC-MCNC: 122 MG/DL (ref 0–100)
POTASSIUM SERPL-SCNC: 4.7 MMOL/L (ref 3.5–5.3)
PROT SERPL-MCNC: 7 G/DL (ref 6.4–8.2)
SODIUM SERPL-SCNC: 143 MMOL/L (ref 136–145)
TRIGL SERPL-MCNC: 195 MG/DL
TSH SERPL DL<=0.05 MIU/L-ACNC: 0.64 UIU/ML (ref 0.36–3.74)

## 2020-10-13 ENCOUNTER — OFFICE VISIT (OUTPATIENT)
Dept: FAMILY MEDICINE CLINIC | [place] CLINIC | Age: 55
End: 2020-10-13
Payer: COMMERCIAL

## 2020-10-13 VITALS
HEIGHT: 64 IN | OXYGEN SATURATION: 96 % | WEIGHT: 171 LBS | SYSTOLIC BLOOD PRESSURE: 122 MMHG | BODY MASS INDEX: 29.19 KG/M2 | HEART RATE: 80 BPM | DIASTOLIC BLOOD PRESSURE: 88 MMHG

## 2020-10-13 DIAGNOSIS — K56.1 INTUSSUSCEPTION (HCC): Primary | ICD-10-CM

## 2020-10-13 DIAGNOSIS — K86.9 PANCREATIC LESION: ICD-10-CM

## 2020-10-13 PROCEDURE — 1111F DSCHRG MED/CURRENT MED MERGE: CPT | Performed by: FAMILY MEDICINE

## 2020-10-13 PROCEDURE — 99214 OFFICE O/P EST MOD 30 MIN: CPT | Performed by: FAMILY MEDICINE

## 2020-10-20 ENCOUNTER — APPOINTMENT (EMERGENCY)
Dept: CT IMAGING | [place] HOSPITAL | Age: 55
End: 2020-10-20
Payer: COMMERCIAL

## 2020-10-20 ENCOUNTER — HOSPITAL ENCOUNTER (OUTPATIENT)
[place] HOSPITAL | Age: 55
Setting detail: OBSERVATION
Discharge: HOME | End: 2020-10-21
Attending: EMERGENCY MEDICINE | Admitting: INTERNAL MEDICINE
Payer: COMMERCIAL

## 2020-10-20 ENCOUNTER — APPOINTMENT (OUTPATIENT)
Dept: MRI IMAGING | [place] HOSPITAL | Age: 55
End: 2020-10-20
Payer: COMMERCIAL

## 2020-10-20 ENCOUNTER — TELEPHONE (OUTPATIENT)
Dept: GASTROENTEROLOGY | [place] AMBULARY SURGERY CENTER | Age: 55
End: 2020-10-20

## 2020-10-20 ENCOUNTER — APPOINTMENT (EMERGENCY)
Dept: RADIOLOGY | [place] HOSPITAL | Age: 55
End: 2020-10-20
Payer: COMMERCIAL

## 2020-10-20 DIAGNOSIS — R47.01 APHASIA: Primary | ICD-10-CM

## 2020-10-20 DIAGNOSIS — R29.90 STROKE-LIKE SYMPTOMS: ICD-10-CM

## 2020-10-20 DIAGNOSIS — I63.9 CVA (CEREBRAL VASCULAR ACCIDENT) (HCC): Primary | ICD-10-CM

## 2020-10-20 DIAGNOSIS — R20.0 RIGHT ARM NUMBNESS: ICD-10-CM

## 2020-10-20 PROBLEM — R07.9 CHEST PAIN: Status: ACTIVE | Noted: 2020-10-20

## 2020-10-20 LAB
ALBUMIN SERPL BCP-MCNC: 4.3 G/DL (ref 3.5–5)
ALP SERPL-CCNC: 73 U/L (ref 46–116)
ALT SERPL W P-5'-P-CCNC: 26 U/L (ref 12–78)
ANION GAP SERPL CALCULATED.3IONS-SCNC: 6 MMOL/L (ref 4–13)
APTT PPP: 32 SECONDS (ref 23–37)
AST SERPL W P-5'-P-CCNC: 17 U/L (ref 5–45)
BASOPHILS # BLD AUTO: 0.02 THOUSANDS/ΜL (ref 0–0.1)
BASOPHILS NFR BLD AUTO: 0 % (ref 0–1)
BILIRUB SERPL-MCNC: 0.46 MG/DL (ref 0.2–1)
BUN SERPL-MCNC: 15 MG/DL (ref 5–25)
CALCIUM SERPL-MCNC: 9.5 MG/DL (ref 8.3–10.1)
CHLORIDE SERPL-SCNC: 102 MMOL/L (ref 100–108)
CO2 SERPL-SCNC: 30 MMOL/L (ref 21–32)
CREAT SERPL-MCNC: 0.77 MG/DL (ref 0.6–1.3)
EOSINOPHIL # BLD AUTO: 0 THOUSAND/ΜL (ref 0–0.61)
EOSINOPHIL NFR BLD AUTO: 0 % (ref 0–6)
ERYTHROCYTE [DISTWIDTH] IN BLOOD BY AUTOMATED COUNT: 12.7 % (ref 11.6–15.1)
GFR SERPL CREATININE-BSD FRML MDRD: 87 ML/MIN/1.73SQ M
GLUCOSE SERPL-MCNC: 98 MG/DL (ref 65–140)
HCT VFR BLD AUTO: 39.3 % (ref 34.8–46.1)
HGB BLD-MCNC: 12.7 G/DL (ref 11.5–15.4)
IMM GRANULOCYTES # BLD AUTO: 0.01 THOUSAND/UL (ref 0–0.2)
IMM GRANULOCYTES NFR BLD AUTO: 0 % (ref 0–2)
INR PPP: 1.08 (ref 0.84–1.19)
LYMPHOCYTES # BLD AUTO: 1.49 THOUSANDS/ΜL (ref 0.6–4.47)
LYMPHOCYTES NFR BLD AUTO: 31 % (ref 14–44)
MCH RBC QN AUTO: 29.2 PG (ref 26.8–34.3)
MCHC RBC AUTO-ENTMCNC: 32.3 G/DL (ref 31.4–37.4)
MCV RBC AUTO: 90 FL (ref 82–98)
MONOCYTES # BLD AUTO: 0.3 THOUSAND/ΜL (ref 0.17–1.22)
MONOCYTES NFR BLD AUTO: 6 % (ref 4–12)
NEUTROPHILS # BLD AUTO: 2.94 THOUSANDS/ΜL (ref 1.85–7.62)
NEUTS SEG NFR BLD AUTO: 63 % (ref 43–75)
NRBC BLD AUTO-RTO: 0 /100 WBCS
PLATELET # BLD AUTO: 273 THOUSANDS/UL (ref 149–390)
PMV BLD AUTO: 9.9 FL (ref 8.9–12.7)
POTASSIUM SERPL-SCNC: 3.4 MMOL/L (ref 3.5–5.3)
PROT SERPL-MCNC: 7.8 G/DL (ref 6.4–8.2)
PROTHROMBIN TIME: 13.8 SECONDS (ref 11.6–14.5)
RBC # BLD AUTO: 4.35 MILLION/UL (ref 3.81–5.12)
SODIUM SERPL-SCNC: 138 MMOL/L (ref 136–145)
TROPONIN I SERPL-MCNC: <0.02 NG/ML
TROPONIN I SERPL-MCNC: <0.02 NG/ML
TSH SERPL DL<=0.05 MIU/L-ACNC: 0.86 UIU/ML (ref 0.36–3.74)
WBC # BLD AUTO: 4.76 THOUSAND/UL (ref 4.31–10.16)

## 2020-10-20 PROCEDURE — 70496 CT ANGIOGRAPHY HEAD: CPT

## 2020-10-20 PROCEDURE — 99285 EMERGENCY DEPT VISIT HI MDM: CPT

## 2020-10-20 PROCEDURE — 84484 ASSAY OF TROPONIN QUANT: CPT | Performed by: EMERGENCY MEDICINE

## 2020-10-20 PROCEDURE — 80053 COMPREHEN METABOLIC PANEL: CPT | Performed by: EMERGENCY MEDICINE

## 2020-10-20 PROCEDURE — G1004 CDSM NDSC: HCPCS

## 2020-10-20 PROCEDURE — 85730 THROMBOPLASTIN TIME PARTIAL: CPT | Performed by: EMERGENCY MEDICINE

## 2020-10-20 PROCEDURE — 85610 PROTHROMBIN TIME: CPT | Performed by: EMERGENCY MEDICINE

## 2020-10-20 PROCEDURE — 84484 ASSAY OF TROPONIN QUANT: CPT | Performed by: INTERNAL MEDICINE

## 2020-10-20 PROCEDURE — 71045 X-RAY EXAM CHEST 1 VIEW: CPT

## 2020-10-20 PROCEDURE — 70551 MRI BRAIN STEM W/O DYE: CPT

## 2020-10-20 PROCEDURE — 85025 COMPLETE CBC W/AUTO DIFF WBC: CPT | Performed by: EMERGENCY MEDICINE

## 2020-10-20 PROCEDURE — 36415 COLL VENOUS BLD VENIPUNCTURE: CPT | Performed by: EMERGENCY MEDICINE

## 2020-10-20 PROCEDURE — 99220 PR INITIAL OBSERVATION CARE/DAY 70 MINUTES: CPT | Performed by: INTERNAL MEDICINE

## 2020-10-20 PROCEDURE — 96374 THER/PROPH/DIAG INJ IV PUSH: CPT

## 2020-10-20 PROCEDURE — 93005 ELECTROCARDIOGRAM TRACING: CPT

## 2020-10-20 PROCEDURE — 99285 EMERGENCY DEPT VISIT HI MDM: CPT | Performed by: EMERGENCY MEDICINE

## 2020-10-20 PROCEDURE — 70498 CT ANGIOGRAPHY NECK: CPT

## 2020-10-20 PROCEDURE — 84443 ASSAY THYROID STIM HORMONE: CPT | Performed by: EMERGENCY MEDICINE

## 2020-10-20 PROCEDURE — 96361 HYDRATE IV INFUSION ADD-ON: CPT

## 2020-10-20 RX ORDER — PANTOPRAZOLE SODIUM 40 MG/1
40 TABLET, DELAYED RELEASE ORAL
Status: DISCONTINUED | OUTPATIENT
Start: 2020-10-21 | End: 2020-10-21 | Stop reason: HOSPADM

## 2020-10-20 RX ORDER — ATORVASTATIN CALCIUM 40 MG/1
40 TABLET, FILM COATED ORAL EVERY EVENING
Status: DISCONTINUED | OUTPATIENT
Start: 2020-10-20 | End: 2020-10-21 | Stop reason: HOSPADM

## 2020-10-20 RX ORDER — ACETAMINOPHEN 325 MG/1
650 TABLET ORAL EVERY 6 HOURS PRN
Status: DISCONTINUED | OUTPATIENT
Start: 2020-10-20 | End: 2020-10-21 | Stop reason: HOSPADM

## 2020-10-20 RX ORDER — DICYCLOMINE HYDROCHLORIDE 10 MG/1
10 CAPSULE ORAL
Status: DISCONTINUED | OUTPATIENT
Start: 2020-10-20 | End: 2020-10-21 | Stop reason: HOSPADM

## 2020-10-20 RX ORDER — KETOROLAC TROMETHAMINE 30 MG/ML
30 INJECTION, SOLUTION INTRAMUSCULAR; INTRAVENOUS ONCE
Status: COMPLETED | OUTPATIENT
Start: 2020-10-20 | End: 2020-10-20

## 2020-10-20 RX ADMIN — SODIUM CHLORIDE 1000 ML: 0.9 INJECTION, SOLUTION INTRAVENOUS at 18:29

## 2020-10-20 RX ADMIN — ACETAMINOPHEN 650 MG: 325 TABLET ORAL at 21:56

## 2020-10-20 RX ADMIN — IOHEXOL 100 ML: 350 INJECTION, SOLUTION INTRAVENOUS at 18:24

## 2020-10-20 RX ADMIN — KETOROLAC TROMETHAMINE 30 MG: 30 INJECTION, SOLUTION INTRAMUSCULAR at 19:47

## 2020-10-20 RX ADMIN — ATORVASTATIN CALCIUM 40 MG: 40 TABLET, FILM COATED ORAL at 21:49

## 2020-10-20 RX ADMIN — DICYCLOMINE HYDROCHLORIDE 10 MG: 10 CAPSULE ORAL at 21:49

## 2020-10-21 ENCOUNTER — APPOINTMENT (OUTPATIENT)
Dept: NON INVASIVE DIAGNOSTICS | [place] HOSPITAL | Age: 55
End: 2020-10-21
Payer: COMMERCIAL

## 2020-10-21 VITALS
HEIGHT: 65 IN | HEART RATE: 70 BPM | OXYGEN SATURATION: 98 % | DIASTOLIC BLOOD PRESSURE: 57 MMHG | RESPIRATION RATE: 18 BRPM | WEIGHT: 167.99 LBS | BODY MASS INDEX: 27.99 KG/M2 | SYSTOLIC BLOOD PRESSURE: 114 MMHG | TEMPERATURE: 97.9 F

## 2020-10-21 PROBLEM — Z86.69 HX OF MIGRAINES: Status: ACTIVE | Noted: 2020-10-21

## 2020-10-21 LAB
ALBUMIN SERPL BCP-MCNC: 3.3 G/DL (ref 3.5–5)
ALP SERPL-CCNC: 58 U/L (ref 46–116)
ALT SERPL W P-5'-P-CCNC: 22 U/L (ref 12–78)
ANION GAP SERPL CALCULATED.3IONS-SCNC: 10 MMOL/L (ref 4–13)
AST SERPL W P-5'-P-CCNC: 14 U/L (ref 5–45)
ATRIAL RATE: 81 BPM
BASOPHILS # BLD AUTO: 0.02 THOUSANDS/ΜL (ref 0–0.1)
BASOPHILS NFR BLD AUTO: 1 % (ref 0–1)
BILIRUB SERPL-MCNC: 0.49 MG/DL (ref 0.2–1)
BUN SERPL-MCNC: 15 MG/DL (ref 5–25)
CALCIUM ALBUM COR SERPL-MCNC: 9.1 MG/DL (ref 8.3–10.1)
CALCIUM SERPL-MCNC: 8.5 MG/DL (ref 8.3–10.1)
CHLORIDE SERPL-SCNC: 108 MMOL/L (ref 100–108)
CHOLEST SERPL-MCNC: 186 MG/DL (ref 50–200)
CO2 SERPL-SCNC: 24 MMOL/L (ref 21–32)
CREAT SERPL-MCNC: 0.78 MG/DL (ref 0.6–1.3)
EOSINOPHIL # BLD AUTO: 0 THOUSAND/ΜL (ref 0–0.61)
EOSINOPHIL NFR BLD AUTO: 0 % (ref 0–6)
ERYTHROCYTE [DISTWIDTH] IN BLOOD BY AUTOMATED COUNT: 12.8 % (ref 11.6–15.1)
EST. AVERAGE GLUCOSE BLD GHB EST-MCNC: 103 MG/DL
GFR SERPL CREATININE-BSD FRML MDRD: 86 ML/MIN/1.73SQ M
GLUCOSE P FAST SERPL-MCNC: 93 MG/DL (ref 65–99)
GLUCOSE SERPL-MCNC: 93 MG/DL (ref 65–140)
HBA1C MFR BLD: 5.2 %
HCT VFR BLD AUTO: 36.4 % (ref 34.8–46.1)
HDLC SERPL-MCNC: 42 MG/DL
HGB BLD-MCNC: 11.9 G/DL (ref 11.5–15.4)
IMM GRANULOCYTES # BLD AUTO: 0.01 THOUSAND/UL (ref 0–0.2)
IMM GRANULOCYTES NFR BLD AUTO: 0 % (ref 0–2)
LDLC SERPL CALC-MCNC: 118 MG/DL (ref 0–100)
LYMPHOCYTES # BLD AUTO: 1.16 THOUSANDS/ΜL (ref 0.6–4.47)
LYMPHOCYTES NFR BLD AUTO: 31 % (ref 14–44)
MCH RBC QN AUTO: 29.7 PG (ref 26.8–34.3)
MCHC RBC AUTO-ENTMCNC: 32.7 G/DL (ref 31.4–37.4)
MCV RBC AUTO: 91 FL (ref 82–98)
MONOCYTES # BLD AUTO: 0.27 THOUSAND/ΜL (ref 0.17–1.22)
MONOCYTES NFR BLD AUTO: 7 % (ref 4–12)
NEUTROPHILS # BLD AUTO: 2.3 THOUSANDS/ΜL (ref 1.85–7.62)
NEUTS SEG NFR BLD AUTO: 61 % (ref 43–75)
NRBC BLD AUTO-RTO: 0 /100 WBCS
P AXIS: 65 DEGREES
PLATELET # BLD AUTO: 206 THOUSANDS/UL (ref 149–390)
PMV BLD AUTO: 9.8 FL (ref 8.9–12.7)
POTASSIUM SERPL-SCNC: 3.6 MMOL/L (ref 3.5–5.3)
PR INTERVAL: 210 MS
PROT SERPL-MCNC: 6.1 G/DL (ref 6.4–8.2)
QRS AXIS: 27 DEGREES
QRSD INTERVAL: 84 MS
QT INTERVAL: 364 MS
QTC INTERVAL: 422 MS
RBC # BLD AUTO: 4.01 MILLION/UL (ref 3.81–5.12)
SODIUM SERPL-SCNC: 142 MMOL/L (ref 136–145)
T WAVE AXIS: 70 DEGREES
TRIGL SERPL-MCNC: 131 MG/DL
TROPONIN I SERPL-MCNC: <0.02 NG/ML
VENTRICULAR RATE: 81 BPM
WBC # BLD AUTO: 3.76 THOUSAND/UL (ref 4.31–10.16)

## 2020-10-21 PROCEDURE — 80061 LIPID PANEL: CPT | Performed by: INTERNAL MEDICINE

## 2020-10-21 PROCEDURE — 97163 PT EVAL HIGH COMPLEX 45 MIN: CPT

## 2020-10-21 PROCEDURE — 85025 COMPLETE CBC W/AUTO DIFF WBC: CPT | Performed by: INTERNAL MEDICINE

## 2020-10-21 PROCEDURE — 93306 TTE W/DOPPLER COMPLETE: CPT | Performed by: INTERNAL MEDICINE

## 2020-10-21 PROCEDURE — 83036 HEMOGLOBIN GLYCOSYLATED A1C: CPT | Performed by: INTERNAL MEDICINE

## 2020-10-21 PROCEDURE — 99217 PR OBSERVATION CARE DISCHARGE MANAGEMENT: CPT | Performed by: PHYSICIAN ASSISTANT

## 2020-10-21 PROCEDURE — 80053 COMPREHEN METABOLIC PANEL: CPT | Performed by: INTERNAL MEDICINE

## 2020-10-21 PROCEDURE — 93010 ELECTROCARDIOGRAM REPORT: CPT

## 2020-10-21 PROCEDURE — 97166 OT EVAL MOD COMPLEX 45 MIN: CPT

## 2020-10-21 PROCEDURE — 99215 OFFICE O/P EST HI 40 MIN: CPT | Performed by: PSYCHIATRY & NEUROLOGY

## 2020-10-21 PROCEDURE — 92610 EVALUATE SWALLOWING FUNCTION: CPT

## 2020-10-21 PROCEDURE — 93306 TTE W/DOPPLER COMPLETE: CPT

## 2020-10-21 RX ORDER — MAGNESIUM SULFATE HEPTAHYDRATE 40 MG/ML
2 INJECTION, SOLUTION INTRAVENOUS
Status: DISCONTINUED | OUTPATIENT
Start: 2020-10-21 | End: 2020-10-21 | Stop reason: HOSPADM

## 2020-10-21 RX ORDER — TRAMADOL HYDROCHLORIDE 50 MG/1
50 TABLET ORAL ONCE
Status: COMPLETED | OUTPATIENT
Start: 2020-10-21 | End: 2020-10-21

## 2020-10-21 RX ORDER — KETOROLAC TROMETHAMINE 30 MG/ML
15 INJECTION, SOLUTION INTRAMUSCULAR; INTRAVENOUS ONCE
Status: COMPLETED | OUTPATIENT
Start: 2020-10-21 | End: 2020-10-21

## 2020-10-21 RX ADMIN — DICYCLOMINE HYDROCHLORIDE 10 MG: 10 CAPSULE ORAL at 06:22

## 2020-10-21 RX ADMIN — PANTOPRAZOLE SODIUM 40 MG: 40 TABLET, DELAYED RELEASE ORAL at 06:22

## 2020-10-21 RX ADMIN — TRAMADOL HYDROCHLORIDE 50 MG: 50 TABLET, FILM COATED ORAL at 02:16

## 2020-10-21 RX ADMIN — DICYCLOMINE HYDROCHLORIDE 10 MG: 10 CAPSULE ORAL at 11:54

## 2020-10-21 RX ADMIN — ACETAMINOPHEN 650 MG: 325 TABLET ORAL at 08:58

## 2020-10-21 RX ADMIN — MAGNESIUM SULFATE IN WATER 2 G: 40 INJECTION, SOLUTION INTRAVENOUS at 11:54

## 2020-10-21 RX ADMIN — ENOXAPARIN SODIUM 40 MG: 40 INJECTION SUBCUTANEOUS at 08:58

## 2020-10-21 RX ADMIN — KETOROLAC TROMETHAMINE 15 MG: 30 INJECTION, SOLUTION INTRAMUSCULAR at 11:52

## 2020-10-21 RX ADMIN — ATORVASTATIN CALCIUM 40 MG: 40 TABLET, FILM COATED ORAL at 17:04

## 2020-10-21 RX ADMIN — DICYCLOMINE HYDROCHLORIDE 10 MG: 10 CAPSULE ORAL at 17:04

## 2020-10-23 ENCOUNTER — TELEPHONE (OUTPATIENT)
Dept: NEUROLOGY | [place] CLINIC | Age: 55
End: 2020-10-23

## 2020-10-26 PROBLEM — M62.89 PELVIC FLOOR DYSFUNCTION IN FEMALE: Status: ACTIVE | Noted: 2020-10-26

## 2020-12-07 ENCOUNTER — OFFICE VISIT (OUTPATIENT)
Dept: FAMILY MEDICINE CLINIC | [place] CLINIC | Age: 55
End: 2020-12-07
Payer: COMMERCIAL

## 2020-12-07 VITALS
WEIGHT: 168 LBS | OXYGEN SATURATION: 98 % | DIASTOLIC BLOOD PRESSURE: 78 MMHG | TEMPERATURE: 97.5 F | HEART RATE: 68 BPM | HEIGHT: 65 IN | BODY MASS INDEX: 27.99 KG/M2 | SYSTOLIC BLOOD PRESSURE: 118 MMHG | RESPIRATION RATE: 17 BRPM

## 2020-12-07 DIAGNOSIS — R91.1 PULMONARY NODULE: Primary | ICD-10-CM

## 2020-12-07 DIAGNOSIS — K86.9 PANCREATIC LESION: ICD-10-CM

## 2020-12-07 DIAGNOSIS — G43.809 MIGRAINE VARIANT: ICD-10-CM

## 2020-12-07 DIAGNOSIS — K59.04 CHRONIC IDIOPATHIC CONSTIPATION: ICD-10-CM

## 2020-12-07 DIAGNOSIS — K22.81 ESOPHAGEAL POLYP: ICD-10-CM

## 2020-12-07 PROCEDURE — 3008F BODY MASS INDEX DOCD: CPT | Performed by: FAMILY MEDICINE

## 2020-12-07 PROCEDURE — 99214 OFFICE O/P EST MOD 30 MIN: CPT | Performed by: FAMILY MEDICINE

## 2020-12-07 PROCEDURE — 1036F TOBACCO NON-USER: CPT | Performed by: FAMILY MEDICINE

## 2020-12-07 RX ORDER — BACLOFEN 20 MG
500 TABLET ORAL DAILY
Qty: 30 EACH | Refills: 5 | Status: SHIPPED | OUTPATIENT
Start: 2020-12-07 | End: 2022-07-12

## 2020-12-07 RX ORDER — DICYCLOMINE HYDROCHLORIDE 10 MG/1
10 CAPSULE ORAL
Qty: 120 CAPSULE | Refills: 0 | Status: SHIPPED | OUTPATIENT
Start: 2020-12-07 | End: 2022-07-12

## 2020-12-07 RX ORDER — KETOROLAC TROMETHAMINE 10 MG/1
10 TABLET, FILM COATED ORAL EVERY 6 HOURS PRN
Qty: 60 TABLET | Refills: 0 | Status: SHIPPED | OUTPATIENT
Start: 2020-12-07 | End: 2022-07-12 | Stop reason: SDUPTHER

## 2020-12-07 RX ORDER — PANTOPRAZOLE SODIUM 40 MG/1
40 TABLET, DELAYED RELEASE ORAL DAILY
Qty: 30 TABLET | Refills: 5 | Status: SHIPPED | OUTPATIENT
Start: 2020-12-07 | End: 2022-08-04 | Stop reason: SDUPTHER

## 2020-12-08 ENCOUNTER — TELEPHONE (OUTPATIENT)
Dept: FAMILY MEDICINE CLINIC | [place] CLINIC | Age: 55
End: 2020-12-08

## 2021-01-07 ENCOUNTER — TELEPHONE (OUTPATIENT)
Dept: FAMILY MEDICINE CLINIC | [place] CLINIC | Age: 56
End: 2021-01-07

## 2021-01-07 DIAGNOSIS — R91.1 PULMONARY NODULE: Primary | ICD-10-CM

## 2021-01-07 NOTE — TELEPHONE ENCOUNTER
Patient needs orders put in for BUN and creatinine    She is supposed to have them drawn before her scheduled CT scan        Thank you

## 2021-01-15 NOTE — ED NOTES
"  Ezio Orantes MD        Vahid Clifton is a 23 month old who presents to clinic today for the following health issues  accompanied by his father  No chief complaint on file.    HPI       ED/UC Followup:  Rash all over  [place]  [facility] – Pt returned from Radiology        Ariana Greene, ELISA  03/09/18 [id number] 

## 2021-01-20 ENCOUNTER — TELEPHONE (OUTPATIENT)
Dept: FAMILY MEDICINE CLINIC | [place] CLINIC | Age: 56
End: 2021-01-20

## 2021-01-20 NOTE — TELEPHONE ENCOUNTER
Patient called asking about when she can return to work  She was exposed to Covid on Jan 4th  She tested positive on January 11th  She has been home from work since Jan 6th  She is still having headaches and is still coughing  Shes been taking Zinc, Vit D, Vit C and cough medicine  She wants to know when she can return to work and is requesting a work note  Please advice

## 2021-01-22 ENCOUNTER — LAB (OUTPATIENT)
Dept: LAB | Age: 56
End: 2021-01-22
Payer: COMMERCIAL

## 2021-01-22 DIAGNOSIS — R91.1 PULMONARY NODULE: ICD-10-CM

## 2021-01-22 LAB
ANION GAP SERPL CALCULATED.3IONS-SCNC: 3 MMOL/L (ref 4–13)
BUN SERPL-MCNC: 14 MG/DL (ref 5–25)
CALCIUM SERPL-MCNC: 9.2 MG/DL (ref 8.3–10.1)
CHLORIDE SERPL-SCNC: 106 MMOL/L (ref 100–108)
CO2 SERPL-SCNC: 32 MMOL/L (ref 21–32)
CREAT SERPL-MCNC: 0.66 MG/DL (ref 0.6–1.3)
GFR SERPL CREATININE-BSD FRML MDRD: 100 ML/MIN/1.73SQ M
GLUCOSE SERPL-MCNC: 77 MG/DL (ref 65–140)
POTASSIUM SERPL-SCNC: 3.7 MMOL/L (ref 3.5–5.3)
SODIUM SERPL-SCNC: 141 MMOL/L (ref 136–145)

## 2021-01-22 PROCEDURE — 80048 BASIC METABOLIC PNL TOTAL CA: CPT

## 2021-01-22 PROCEDURE — 36415 COLL VENOUS BLD VENIPUNCTURE: CPT

## 2021-01-25 ENCOUNTER — TELEPHONE (OUTPATIENT)
Dept: ADMINISTRATIVE | [place] OTHER | Age: 56
End: 2021-01-25

## 2021-01-25 NOTE — TELEPHONE ENCOUNTER
Upon review of the In Basket request we were able to locate, review, and update the patient chart as requested for HIV  Any additional questions or concerns should be emailed to the Practice Liaisons via [email or web address] email, please do not reply via In Basket      Thank you  Ludmila Brian

## 2021-01-25 NOTE — TELEPHONE ENCOUNTER
----- Message from Mariela Grover MA sent at 1/25/2021  8:06 AM EST -----  Regarding: hiv screening  01/25/21 8:06 AM    Hello, our patient Neena Sarah has had hiv screening  completed/performed  Please assist in updating the patient chart by flowing from care everywhere  The date of service is 6/20/16       Thank you,  Mariela Grover MA  [facility]

## 2021-02-23 ENCOUNTER — HOSPITAL ENCOUNTER (OUTPATIENT)
Dept: MRI IMAGING | [place] HOSPITAL | Age: 56
Discharge: HOME | End: 2021-02-23
Payer: COMMERCIAL

## 2021-02-23 ENCOUNTER — HOSPITAL ENCOUNTER (OUTPATIENT)
Dept: CT IMAGING | [place] HOSPITAL | Age: 56
Discharge: HOME | End: 2021-02-23
Payer: COMMERCIAL

## 2021-02-23 DIAGNOSIS — K86.9 PANCREATIC LESION: ICD-10-CM

## 2021-02-23 DIAGNOSIS — R91.1 PULMONARY NODULE: ICD-10-CM

## 2021-02-23 PROCEDURE — 71250 CT THORAX DX C-: CPT

## 2021-02-23 PROCEDURE — G1004 CDSM NDSC: HCPCS

## 2021-02-23 PROCEDURE — A9585 GADOBUTROL INJECTION: HCPCS | Performed by: FAMILY MEDICINE

## 2021-02-23 PROCEDURE — 74183 MRI ABD W/O CNTR FLWD CNTR: CPT

## 2021-02-23 RX ADMIN — GADOBUTROL 7 ML: 604.72 INJECTION INTRAVENOUS at 18:59

## 2021-03-01 ENCOUNTER — TELEPHONE (OUTPATIENT)
Dept: SURGICAL ONCOLOGY | [place] CLINIC | Age: 56
End: 2021-03-01

## 2021-03-01 ENCOUNTER — TELEPHONE (OUTPATIENT)
Dept: FAMILY MEDICINE CLINIC | [place] CLINIC | Age: 56
End: 2021-03-01

## 2021-03-01 DIAGNOSIS — K86.9 PANCREATIC LESION: Primary | ICD-10-CM

## 2021-03-01 NOTE — TELEPHONE ENCOUNTER
New Patient GI Form   Patient Details:  Fina Adams  1965  [id number]     Background Information:  [address] starts by opening a telephone encounter and gathering the following information   Who is calling to schedule and relationship?  provider   Referring Provider Dr Bakari Bravo   To which specialty is the referral?  surg  onc   Reason for Visit? New Diagnosis   Tumor Type?  panc lesion   Is there a confirmed diagnosis from biopsy/tissue reviewed by Pathology?  no   Date of Tissue Diagnosis  (If done outside of [facility] please obtain report and slides)  (If no tissue diagnosis, please stop and discuss with Navigator prior to scheduling)     Is patient aware of the diagnosis? yes   Has Imaging been completed? yes   If YES, where was the imaging done? (If outside [place] [address] obtain records)     Have any endoscopies been done (colonoscopy, EGD, EUS)  no   If YES where were they performed? (If outside of [facility] obtain the records)    Has blood work been done?  yes   If YES, where was the blood work done? (If outside of [facility] obtain records)  sl   Is there a personal history of cancer? (If YES please list type)  No   Is there a family history of cancer? (If YES please list type)  No   Scheduling Information:   Preferred [facility]   Are there any days the patient cannot be seen? Miscellaneous:    After completing the above information, please route to finance, nurse navigation and clinical trials for review

## 2021-03-10 PROBLEM — K86.2 PANCREAS CYST: Status: ACTIVE | Noted: 2021-03-10

## 2021-03-11 ENCOUNTER — CONSULT (OUTPATIENT)
Dept: SURGICAL ONCOLOGY | [place] CLINIC | Age: 56
End: 2021-03-11
Payer: COMMERCIAL

## 2021-03-11 VITALS
DIASTOLIC BLOOD PRESSURE: 90 MMHG | RESPIRATION RATE: 16 BRPM | HEART RATE: 70 BPM | BODY MASS INDEX: 28.32 KG/M2 | HEIGHT: 65 IN | SYSTOLIC BLOOD PRESSURE: 128 MMHG | WEIGHT: 170 LBS | TEMPERATURE: 98.4 F

## 2021-03-11 DIAGNOSIS — K86.2 PANCREAS CYST: Primary | ICD-10-CM

## 2021-03-11 PROCEDURE — 1036F TOBACCO NON-USER: CPT | Performed by: SURGERY

## 2021-03-11 PROCEDURE — 3008F BODY MASS INDEX DOCD: CPT | Performed by: SURGERY

## 2021-03-11 PROCEDURE — 99204 OFFICE O/P NEW MOD 45 MIN: CPT | Performed by: SURGERY
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
## 2021-05-25 ENCOUNTER — OFFICE VISIT (OUTPATIENT)
Dept: FAMILY MEDICINE CLINIC | [place] CLINIC | Age: 56
End: 2021-05-25
Payer: COMMERCIAL

## 2021-05-25 VITALS
OXYGEN SATURATION: 98 % | SYSTOLIC BLOOD PRESSURE: 122 MMHG | WEIGHT: 167.8 LBS | HEIGHT: 65 IN | HEART RATE: 80 BPM | DIASTOLIC BLOOD PRESSURE: 86 MMHG | TEMPERATURE: 98.8 F | RESPIRATION RATE: 17 BRPM | BODY MASS INDEX: 27.96 KG/M2

## 2021-05-25 DIAGNOSIS — K59.04 CHRONIC IDIOPATHIC CONSTIPATION: ICD-10-CM

## 2021-05-25 DIAGNOSIS — D64.9 ANEMIA, UNSPECIFIED TYPE: ICD-10-CM

## 2021-05-25 DIAGNOSIS — E78.5 HYPERLIPIDEMIA, UNSPECIFIED HYPERLIPIDEMIA TYPE: ICD-10-CM

## 2021-05-25 DIAGNOSIS — Z00.00 ANNUAL PHYSICAL EXAM: Primary | ICD-10-CM

## 2021-05-25 DIAGNOSIS — G47.00 INSOMNIA, UNSPECIFIED TYPE: ICD-10-CM

## 2021-05-25 DIAGNOSIS — L72.9 SCALP CYST: ICD-10-CM

## 2021-05-25 DIAGNOSIS — Z13.29 SCREENING FOR THYROID DISORDER: ICD-10-CM

## 2021-05-25 PROCEDURE — 1036F TOBACCO NON-USER: CPT | Performed by: FAMILY MEDICINE

## 2021-05-25 PROCEDURE — 3725F SCREEN DEPRESSION PERFORMED: CPT | Performed by: FAMILY MEDICINE

## 2021-05-25 PROCEDURE — 99396 PREV VISIT EST AGE 40-64: CPT | Performed by: FAMILY MEDICINE

## 2021-05-25 PROCEDURE — 3008F BODY MASS INDEX DOCD: CPT | Performed by: FAMILY MEDICINE

## 2021-05-25 RX ORDER — L. ACIDOPHILUS/BIFID. ANIMALIS 2B CELL
1 CAPSULE ORAL DAILY
Qty: 30 CAPSULE | Refills: 0 | Status: SHIPPED | OUTPATIENT
Start: 2021-05-25 | End: 2022-07-12 | Stop reason: SDUPTHER

## 2021-05-25 RX ORDER — MELATONIN
1000 DAILY
COMMUNITY
End: 2022-08-04 | Stop reason: SDUPTHER

## 2021-05-25 RX ORDER — MULTIVIT WITH MINERALS/LUTEIN
1000 TABLET ORAL DAILY
COMMUNITY
End: 2022-08-04 | Stop reason: SDUPTHER

## 2021-05-25 RX ORDER — PHENOL 1.4 %
10 AEROSOL, SPRAY (ML) MUCOUS MEMBRANE
Qty: 30 TABLET | Refills: 0 | Status: SHIPPED | OUTPATIENT
Start: 2021-05-25 | End: 2022-08-04 | Stop reason: SDUPTHER

## 2021-05-25 NOTE — PATIENT INSTRUCTIONS

## 2021-05-25 NOTE — PROGRESS NOTES
ADULT ANNUAL [address] [facility]    NAME: James Reynolds  AGE: 64 y o  SEX: female  : 1965     DATE: 2021     Assessment and Plan:     Problem List Items Addressed This Visit        Other    Hyperlipidemia    Relevant Orders    Comprehensive metabolic panel    Anemia    Relevant Orders    CBC    Comprehensive metabolic panel    Iron Panel (Includes Ferritin, Iron Sat%, Iron, and TIBC)    Constipation    Relevant Medications    Probiotic Product (TruBiotics) CAPS      Other Visit Diagnoses     Annual physical exam    -  Primary    Screening for thyroid disorder        Relevant Orders    TSH, 3rd generation with Free T4 reflex    Insomnia, unspecified type        Relevant Medications    Melatonin 10 MG TABS    Scalp cyst        Relevant Orders    Ambulatory referral to Dermatology          Immunizations and preventive care screenings were discussed with patient today  Appropriate education was printed on patient's after visit summary  Counseling:  Alcohol/drug use: discussed moderation in alcohol intake, the recommendations for healthy alcohol use, and avoidance of illicit drug use  Dental Health: discussed importance of regular tooth brushing, flossing, and dental visits  Injury prevention: discussed safety/seat belts, safety helmets, smoke detectors, carbon dioxide detectors, and smoking near bedding or upholstery  Sexual health: discussed sexually transmitted diseases, partner selection, use of condoms, avoidance of unintended pregnancy, and contraceptive alternatives  · Exercise: the importance of regular exercise/physical activity was discussed  Recommend exercise 3-5 times per week for at least 30 minutes  BMI Counseling: Body mass index is 27 92 kg/m²   The BMI is above normal  Nutrition recommendations include decreasing portion sizes, encouraging healthy choices of fruits and vegetables, decreasing fast food intake, consuming healthier snacks and limiting drinks that contain sugar  Exercise recommendations include moderate physical activity 150 minutes/week and exercising 3-5 times per week  No pharmacotherapy was ordered  Patient referred to PCP due to patient being overweight  Depression Screening and Follow-up Plan: Patient's depression screening was positive with a PHQ-2 score of 0  Clincally patient does not have depression  No treatment is required  Return in 6 months (on 11/25/2021)  Chief Complaint:     Chief Complaint   Patient presents with    Physical Exam      History of Present Illness:     Adult Annual Physical   Patient here for a comprehensive physical exam  The patient reports no problems  Diet and Physical Activity  · Diet/Nutrition: well balanced diet  · Exercise: 3-4 times a week on average  Depression Screening  PHQ-9 Depression Screening    PHQ-9:   Frequency of the following problems over the past two weeks:      Little interest or pleasure in doing things: 0 - not at all  Feeling down, depressed, or hopeless: 0 - not at all  PHQ-2 Score: 0       General Health  · Sleep: sleeps poorly  · Hearing: normal - bilateral   · Vision: no vision problems  · Dental: regular dental visits  /GYN Health  · Patient is: postmenopausal     Review of Systems:     Review of Systems   Constitutional: Negative for activity change, chills, fatigue and fever  HENT: Negative for congestion, ear pain, sinus pressure and sore throat  Eyes: Negative for redness, itching and visual disturbance  Respiratory: Negative for cough and shortness of breath  Cardiovascular: Negative for chest pain and palpitations  Gastrointestinal: Negative for abdominal pain, diarrhea and nausea  Endocrine: Negative for cold intolerance and heat intolerance  Genitourinary: Negative for dysuria, flank pain and frequency     Musculoskeletal: Negative for arthralgias, back pain, gait problem and myalgias  Skin: Negative for color change  Allergic/Immunologic: Negative for environmental allergies  Neurological: Negative for dizziness, numbness and headaches  Psychiatric/Behavioral: Negative for behavioral problems and sleep disturbance  Past Medical History:     Past Medical History:   Diagnosis Date    Colon polyp     Head injury     Knee injury     right knee    Migraine       Past Surgical History:     Past Surgical History:   Procedure Laterality Date    COLONOSCOPY      LAPAROSCOPY      Exploratory    [place] ESOPHAGOGASTRODUODENOSCOPY TRANSORAL DIAGNOSTIC N/A 2018    Procedure: ESOPHAGOGASTRODUODENOSCOPY (EGD); Surgeon: Ashley Roque MD;  Location: [facility];   Service: Gastroenterology      Social History:     E-Cigarette/Vaping    E-Cigarette Use Never User      E-Cigarette/Vaping Substances    Nicotine No     THC No     CBD No     Flavoring No     Other No     Unknown No      Social History     Socioeconomic History    Marital status:      Spouse name: None    Number of children: None    Years of education: None    Highest education level: None   Occupational History    None   Social Needs    Financial resource strain: None    Food insecurity     Worry: None     Inability: None    Transportation needs     Medical: None     Non-medical: None   Tobacco Use    Smoking status: Former Smoker     Quit date:      Years since quittin 4    Smokeless tobacco: Never Used   Substance and Sexual Activity    Alcohol use: Yes     Frequency: 2-4 times a month     Comment: social    Drug use: No    Sexual activity: None   Lifestyle    Physical activity     Days per week: None     Minutes per session: None    Stress: None   Relationships    Social connections     Talks on phone: None     Gets together: None     Attends Rastafarian service: None     Active member of club or organization: None     Attends meetings of clubs or organizations: None Relationship status: None    Intimate partner violence     Fear of current or ex partner: None     Emotionally abused: None     Physically abused: None     Forced sexual activity: None   Other Topics Concern    None   Social History Narrative    Consume 1 cup of coffee per day    Uses safety equipment: seatbelt      Family History:     Family History   Problem Relation Age of Onset    Diabetes Mother     Hypertension Mother     Heart disease Mother     Colon cancer Neg Hx       Current Medications:     Current Outpatient Medications   Medication Sig Dispense Refill    Ascorbic Acid (vitamin C) 1000 MG tablet Take 1,000 mg by mouth daily      cholecalciferol (VITAMIN D3) 1,000 units tablet Take 1,000 Units by mouth daily      Zinc Sulfate (ZINC 15 PO) Take by mouth      acetaminophen (TYLENOL) 325 mg tablet Take 2 tablets (650 mg total) by mouth every 6 (six) hours as needed for mild pain (Patient not taking: Reported on 5/25/2021) 30 tablet 0    dicyclomine (BENTYL) 10 mg capsule Take 1 capsule (10 mg total) by mouth 4 (four) times a day (before meals and at bedtime) (Patient not taking: Reported on 5/25/2021) 120 capsule 0    docusate sodium (COLACE) 100 mg capsule Take 1 capsule (100 mg total) by mouth 2 (two) times a day (Patient not taking: Reported on 5/25/2021) 10 capsule 0    ketorolac (TORADOL) 10 mg tablet Take 1 tablet (10 mg total) by mouth every 6 (six) hours as needed for moderate pain (Patient not taking: Reported on 5/25/2021) 60 tablet 0    Magnesium Oxide 500 MG TABS Take 1 tablet (500 mg total) by mouth daily (Patient not taking: Reported on 5/25/2021) 30 each 5    Melatonin 10 MG TABS Take 1 tablet (10 mg total) by mouth daily at bedtime 30 tablet 0    pantoprazole (PROTONIX) 40 mg tablet Take 1 tablet (40 mg total) by mouth daily (Patient not taking: Reported on 5/25/2021) 30 tablet 5    polyethylene glycol (MIRALAX) 17 g packet Take 17 g by mouth daily (Patient not taking: Reported on 5/25/2021) 30 each 0    Probiotic Product (TruBiotics) CAPS Take 1 capsule by mouth daily 30 capsule 0    simethicone (MYLICON,GAS-X) 355 MG capsule Take 1 capsule (180 mg total) by mouth 2 (two) times a day as needed for flatulence (Patient not taking: Reported on 5/25/2021) 60 capsule 0     No current facility-administered medications for this visit  Allergies: Allergies   Allergen Reactions    Aspirin Anaphylaxis    Penicillin V Anaphylaxis      Physical Exam:     /86 (BP Location: Left arm, Patient Position: Sitting, Cuff Size: Adult)   Pulse 80   Temp 98 8 °F (37 1 °C) (Tympanic)   Resp 17   Ht 5' 5" (1 651 m)   Wt 76 1 kg (167 lb 12 8 oz)   LMP 04/30/2019   SpO2 98%   BMI 27 92 kg/m²     Physical Exam  Vitals signs reviewed  Constitutional:       General: She is not in acute distress  Appearance: She is well-developed  She is not diaphoretic  HENT:      Head: Normocephalic and atraumatic  Right Ear: External ear normal       Left Ear: External ear normal       Nose: Nose normal    Eyes:      General:         Right eye: No discharge  Left eye: No discharge  Pupils: Pupils are equal, round, and reactive to light  Neck:      Musculoskeletal: Normal range of motion and neck supple  Cardiovascular:      Rate and Rhythm: Normal rate and regular rhythm  Heart sounds: Normal heart sounds  No murmur  No friction rub  No gallop  Pulmonary:      Effort: Pulmonary effort is normal  No respiratory distress  Breath sounds: Normal breath sounds  No wheezing or rales  Abdominal:      General: Bowel sounds are normal  There is no distension  Palpations: Abdomen is soft  Tenderness: There is no abdominal tenderness  There is no guarding  Musculoskeletal: Normal range of motion  Lymphadenopathy:      Cervical: No cervical adenopathy  Skin:     General: Skin is warm and dry        Capillary Refill: Capillary refill takes less than 2 seconds  Findings: No erythema or rash  Neurological:      Mental Status: She is alert and oriented to person, place, and time  Cranial Nerves: No cranial nerve deficit  Psychiatric:         Behavior: Behavior normal          Thought Content:  Thought content normal          Judgment: Judgment normal           DO AVI [facility]

## 2021-05-27 ENCOUNTER — TELEPHONE (OUTPATIENT)
Dept: FAMILY MEDICINE CLINIC | [place] CLINIC | Age: 56
End: 2021-05-27

## 2021-07-12 ENCOUNTER — APPOINTMENT (OUTPATIENT)
Dept: LAB | Age: 56
End: 2021-07-12
Payer: COMMERCIAL

## 2021-07-12 DIAGNOSIS — D64.9 ANEMIA, UNSPECIFIED TYPE: ICD-10-CM

## 2021-07-12 DIAGNOSIS — E78.5 HYPERLIPIDEMIA, UNSPECIFIED HYPERLIPIDEMIA TYPE: ICD-10-CM

## 2021-07-12 DIAGNOSIS — Z13.29 SCREENING FOR THYROID DISORDER: ICD-10-CM

## 2021-07-12 LAB
ALBUMIN SERPL BCP-MCNC: 4.1 G/DL (ref 3.5–5)
ALP SERPL-CCNC: 73 U/L (ref 46–116)
ALT SERPL W P-5'-P-CCNC: 36 U/L (ref 12–78)
ANION GAP SERPL CALCULATED.3IONS-SCNC: 4 MMOL/L (ref 4–13)
AST SERPL W P-5'-P-CCNC: 21 U/L (ref 5–45)
BILIRUB SERPL-MCNC: 0.48 MG/DL (ref 0.2–1)
BUN SERPL-MCNC: 13 MG/DL (ref 5–25)
CALCIUM SERPL-MCNC: 9.4 MG/DL (ref 8.3–10.1)
CHLORIDE SERPL-SCNC: 103 MMOL/L (ref 100–108)
CO2 SERPL-SCNC: 31 MMOL/L (ref 21–32)
CREAT SERPL-MCNC: 0.71 MG/DL (ref 0.6–1.3)
ERYTHROCYTE [DISTWIDTH] IN BLOOD BY AUTOMATED COUNT: 13.3 % (ref 11.6–15.1)
FERRITIN SERPL-MCNC: 33 NG/ML (ref 8–388)
GFR SERPL CREATININE-BSD FRML MDRD: 96 ML/MIN/1.73SQ M
GLUCOSE SERPL-MCNC: 99 MG/DL (ref 65–140)
HCT VFR BLD AUTO: 39.8 % (ref 34.8–46.1)
HGB BLD-MCNC: 13.1 G/DL (ref 11.5–15.4)
IRON SATN MFR SERPL: 19 %
IRON SERPL-MCNC: 69 UG/DL (ref 50–170)
MCH RBC QN AUTO: 29.3 PG (ref 26.8–34.3)
MCHC RBC AUTO-ENTMCNC: 32.9 G/DL (ref 31.4–37.4)
MCV RBC AUTO: 89 FL (ref 82–98)
PLATELET # BLD AUTO: 315 THOUSANDS/UL (ref 149–390)
PMV BLD AUTO: 10.3 FL (ref 8.9–12.7)
POTASSIUM SERPL-SCNC: 3.8 MMOL/L (ref 3.5–5.3)
PROT SERPL-MCNC: 7.5 G/DL (ref 6.4–8.2)
RBC # BLD AUTO: 4.47 MILLION/UL (ref 3.81–5.12)
SODIUM SERPL-SCNC: 138 MMOL/L (ref 136–145)
TIBC SERPL-MCNC: 367 UG/DL (ref 250–450)
TSH SERPL DL<=0.05 MIU/L-ACNC: 0.82 UIU/ML (ref 0.36–3.74)
WBC # BLD AUTO: 4.86 THOUSAND/UL (ref 4.31–10.16)

## 2021-07-12 PROCEDURE — 83540 ASSAY OF IRON: CPT

## 2021-07-12 PROCEDURE — 36415 COLL VENOUS BLD VENIPUNCTURE: CPT

## 2021-07-12 PROCEDURE — 85027 COMPLETE CBC AUTOMATED: CPT

## 2021-07-12 PROCEDURE — 82728 ASSAY OF FERRITIN: CPT

## 2021-07-12 PROCEDURE — 84443 ASSAY THYROID STIM HORMONE: CPT

## 2021-07-12 PROCEDURE — 83550 IRON BINDING TEST: CPT

## 2021-07-12 PROCEDURE — 80053 COMPREHEN METABOLIC PANEL: CPT

## 2022-03-14 ENCOUNTER — TELEPHONE (OUTPATIENT)
Dept: SURGICAL ONCOLOGY | [place] CLINIC | Age: 57
End: 2022-03-14

## 2022-03-14 NOTE — TELEPHONE ENCOUNTER
Left message for patient stating that she did not get her MRI done for her appointment with Dr [facility]  Phone number for central scheduling and office given to patient  Appointment canceled

## 2022-04-18 ENCOUNTER — TELEPHONE (OUTPATIENT)
Dept: SURGICAL ONCOLOGY | [place] CLINIC | Age: 57
End: 2022-04-18

## 2022-04-22 ENCOUNTER — TELEPHONE (OUTPATIENT)
Dept: FAMILY MEDICINE CLINIC | [place] CLINIC | Age: 57
End: 2022-04-22

## 2022-05-23 ENCOUNTER — TELEPHONE (OUTPATIENT)
Dept: SURGICAL ONCOLOGY | [place] CLINIC | Age: 57
End: 2022-05-23

## 2022-05-23 NOTE — TELEPHONE ENCOUNTER
----- Message from Sigifredo Chin RN sent at 5/23/2022  8:24 AM EDT -----  Patient due for MRI and follow up with Dr Raimundo Guo   Please help schedule

## 2022-05-23 NOTE — TELEPHONE ENCOUNTER
Called patient and left message to schedule an MRI and follow up for after the MRI with Dr Jocy Will

## 2022-07-12 ENCOUNTER — OFFICE VISIT (OUTPATIENT)
Dept: FAMILY MEDICINE CLINIC | [place] CLINIC | Age: 57
End: 2022-07-12
Payer: COMMERCIAL

## 2022-07-12 ENCOUNTER — APPOINTMENT (OUTPATIENT)
Dept: RADIOLOGY | [place] CLINIC | Age: 57
End: 2022-07-12
Payer: COMMERCIAL

## 2022-07-12 VITALS
HEART RATE: 80 BPM | TEMPERATURE: 98.9 F | HEIGHT: 65 IN | WEIGHT: 161.4 LBS | RESPIRATION RATE: 16 BRPM | SYSTOLIC BLOOD PRESSURE: 126 MMHG | BODY MASS INDEX: 26.89 KG/M2 | OXYGEN SATURATION: 98 % | DIASTOLIC BLOOD PRESSURE: 80 MMHG

## 2022-07-12 DIAGNOSIS — Z12.31 ENCOUNTER FOR SCREENING MAMMOGRAM FOR MALIGNANT NEOPLASM OF BREAST: Primary | ICD-10-CM

## 2022-07-12 DIAGNOSIS — K86.9 PANCREATIC LESION: ICD-10-CM

## 2022-07-12 DIAGNOSIS — S00.83XA CONTUSION OF FACE, INITIAL ENCOUNTER: ICD-10-CM

## 2022-07-12 DIAGNOSIS — Z13.1 SCREENING FOR DIABETES MELLITUS: ICD-10-CM

## 2022-07-12 DIAGNOSIS — R22.1 MASS OF LEFT SIDE OF NECK: ICD-10-CM

## 2022-07-12 DIAGNOSIS — K59.04 CHRONIC IDIOPATHIC CONSTIPATION: ICD-10-CM

## 2022-07-12 DIAGNOSIS — Z00.00 ANNUAL PHYSICAL EXAM: ICD-10-CM

## 2022-07-12 DIAGNOSIS — Z13.29 SCREENING FOR THYROID DISORDER: ICD-10-CM

## 2022-07-12 DIAGNOSIS — E78.5 HYPERLIPIDEMIA, UNSPECIFIED HYPERLIPIDEMIA TYPE: ICD-10-CM

## 2022-07-12 DIAGNOSIS — D64.9 ANEMIA, UNSPECIFIED TYPE: ICD-10-CM

## 2022-07-12 DIAGNOSIS — H91.93 BILATERAL HEARING LOSS, UNSPECIFIED HEARING LOSS TYPE: ICD-10-CM

## 2022-07-12 DIAGNOSIS — G43.809 MIGRAINE VARIANT: ICD-10-CM

## 2022-07-12 PROCEDURE — 70150 X-RAY EXAM OF FACIAL BONES: CPT

## 2022-07-12 PROCEDURE — 99215 OFFICE O/P EST HI 40 MIN: CPT | Performed by: FAMILY MEDICINE

## 2022-07-12 PROCEDURE — 3725F SCREEN DEPRESSION PERFORMED: CPT | Performed by: FAMILY MEDICINE

## 2022-07-12 PROCEDURE — 99396 PREV VISIT EST AGE 40-64: CPT | Performed by: FAMILY MEDICINE

## 2022-07-12 RX ORDER — L. ACIDOPHILUS/BIFID. ANIMALIS 2B CELL
1 CAPSULE ORAL DAILY
Qty: 90 CAPSULE | Refills: 1 | Status: SHIPPED | OUTPATIENT
Start: 2022-07-12 | End: 2022-08-04 | Stop reason: SDUPTHER

## 2022-07-12 RX ORDER — KETOROLAC TROMETHAMINE 10 MG/1
10 TABLET, FILM COATED ORAL EVERY 6 HOURS PRN
Qty: 60 TABLET | Refills: 0 | Status: SHIPPED | OUTPATIENT
Start: 2022-07-12 | End: 2022-08-04 | Stop reason: SDUPTHER

## 2022-07-12 RX ORDER — DICYCLOMINE HYDROCHLORIDE 10 MG/1
10 CAPSULE ORAL
Qty: 120 CAPSULE | Refills: 0 | Status: CANCELLED | OUTPATIENT
Start: 2022-07-12
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
## 2022-07-12 NOTE — PROGRESS NOTES
ADULT ANNUAL [address]  GROUP    NAME: Kati Gil  AGE: 62 y o  SEX: female  : 1965     DATE: 2022     Assessment and Plan:     Problem List Items Addressed This Visit        Cardiovascular and Mediastinum    Migraine variant       Other    Constipation      Other Visit Diagnoses     Encounter for screening mammogram for malignant neoplasm of breast    -  Primary    Annual physical exam        BMI 26 0-26 9,adult              Immunizations and preventive care screenings were discussed with patient today  Appropriate education was printed on patient's after visit summary  Counseling:  Alcohol/drug use: discussed moderation in alcohol intake, the recommendations for healthy alcohol use, and avoidance of illicit drug use  Dental Health: discussed importance of regular tooth brushing, flossing, and dental visits  Injury prevention: discussed safety/seat belts, safety helmets, smoke detectors, carbon dioxide detectors, and smoking near bedding or upholstery  Sexual health: discussed sexually transmitted diseases, partner selection, use of condoms, avoidance of unintended pregnancy, and contraceptive alternatives  · Exercise: the importance of regular exercise/physical activity was discussed  Recommend exercise 3-5 times per week for at least 30 minutes  Return in 1 year (on 2023)  Chief Complaint:     Chief Complaint   Patient presents with    Physical Exam      History of Present Illness:     Adult Annual Physical   Patient here for a comprehensive physical exam  The patient reports problems - left neck lump  Diet and Physical Activity  · Diet/Nutrition: well balanced diet  · Exercise: 5-7 times a week on average        Depression Screening  PHQ-2/9 Depression Screening    Little interest or pleasure in doing things: 0 - not at all  Feeling down, depressed, or hopeless: 0 - not at all  PHQ-2 Score: 0  PHQ-2 Interpretation: Negative depression screen       General Health  · Sleep: "8 hours per week" per patient  · Hearing: normal - bilateral    · Vision: goes for regular eye exams  · Dental: regular dental visits  /GYN Health  · Patient is: postmenopausal     Review of Systems:     Review of Systems   Constitutional: Negative for activity change, chills, fatigue and fever  HENT: Negative for congestion, ear pain, sinus pressure and sore throat  Eyes: Negative for redness, itching and visual disturbance  Respiratory: Negative for cough and shortness of breath  Cardiovascular: Negative for chest pain and palpitations  Gastrointestinal: Negative for abdominal pain, diarrhea and nausea  Endocrine: Negative for cold intolerance and heat intolerance  Genitourinary: Negative for dysuria, flank pain and frequency  Musculoskeletal: Negative for arthralgias, back pain, gait problem and myalgias  Skin: Negative for color change  Allergic/Immunologic: Negative for environmental allergies  Neurological: Negative for dizziness, numbness and headaches  Psychiatric/Behavioral: Negative for behavioral problems and sleep disturbance  Past Medical History:     Past Medical History:   Diagnosis Date    Colon polyp     Head injury     Knee injury     right knee    Migraine       Past Surgical History:     Past Surgical History:   Procedure Laterality Date    COLONOSCOPY      LAPAROSCOPY  1983    Exploratory    ND ESOPHAGOGASTRODUODENOSCOPY TRANSORAL DIAGNOSTIC N/A 12/20/2018    Procedure: ESOPHAGOGASTRODUODENOSCOPY (EGD); Surgeon: Mehnaz Krueger MD;  Location: [facility];   Service: Gastroenterology      Social History:     Social History     Socioeconomic History    Marital status:      Spouse name: None    Number of children: None    Years of education: None    Highest education level: None   Occupational History    None   Tobacco Use    Smoking status: Former Smoker Quit date: 200     Years since quittin 5    Smokeless tobacco: Never Used   Vaping Use    Vaping Use: Never used   Substance and Sexual Activity    Alcohol use: Yes     Comment: social    Drug use: No    Sexual activity: None   Other Topics Concern    None   Social History Narrative    Consume 1 cup of coffee per day    Uses safety equipment: seatbelt     Social Determinants of Health     Financial Resource Strain: Not on file   Food Insecurity: Not on file   Transportation Needs: Not on file   Physical Activity: Not on file   Stress: Not on file   Social Connections: Not on file   Intimate Partner Violence: Not on file   Housing Stability: Not on file      Family History:     Family History   Problem Relation Age of Onset    Diabetes Mother     Hypertension Mother     Heart disease Mother     Colon cancer Neg Hx       Current Medications:     Current Outpatient Medications   Medication Sig Dispense Refill    Ascorbic Acid (vitamin C) 1000 MG tablet Take 1,000 mg by mouth daily      cholecalciferol (VITAMIN D3) 1,000 units tablet Take 1,000 Units by mouth daily      Melatonin 10 MG TABS Take 1 tablet (10 mg total) by mouth daily at bedtime 30 tablet 0    Probiotic Product (TruBiotics) CAPS Take 1 capsule by mouth daily 30 capsule 0    Zinc Sulfate (ZINC 15 PO) Take by mouth      acetaminophen (TYLENOL) 325 mg tablet Take 2 tablets (650 mg total) by mouth every 6 (six) hours as needed for mild pain (Patient not taking: No sig reported) 30 tablet 0    dicyclomine (BENTYL) 10 mg capsule Take 1 capsule (10 mg total) by mouth 4 (four) times a day (before meals and at bedtime) (Patient not taking: No sig reported) 120 capsule 0    docusate sodium (COLACE) 100 mg capsule Take 1 capsule (100 mg total) by mouth 2 (two) times a day (Patient not taking: No sig reported) 10 capsule 0    ketorolac (TORADOL) 10 mg tablet Take 1 tablet (10 mg total) by mouth every 6 (six) hours as needed for moderate pain (Patient not taking: No sig reported) 60 tablet 0    Magnesium Oxide 500 MG TABS Take 1 tablet (500 mg total) by mouth daily (Patient not taking: No sig reported) 30 each 5    pantoprazole (PROTONIX) 40 mg tablet Take 1 tablet (40 mg total) by mouth daily (Patient not taking: No sig reported) 30 tablet 5    polyethylene glycol (MIRALAX) 17 g packet Take 17 g by mouth daily (Patient not taking: No sig reported) 30 each 0    simethicone (MYLICON,GAS-X) 067 MG capsule Take 1 capsule (180 mg total) by mouth 2 (two) times a day as needed for flatulence (Patient not taking: No sig reported) 60 capsule 0     No current facility-administered medications for this visit  Allergies: Allergies   Allergen Reactions    Aspirin Anaphylaxis    Penicillin V Anaphylaxis      Physical Exam:     /80   Pulse 80   Temp 98 9 °F (37 2 °C) (Tympanic)   Resp 16   Ht 5' 5" (1 651 m)   Wt 73 2 kg (161 lb 6 4 oz)   LMP 04/30/2019   SpO2 98%   BMI 26 86 kg/m²     Physical Exam  Vitals reviewed  Constitutional:       General: She is not in acute distress  Appearance: She is well-developed  She is not diaphoretic  HENT:      Head: Normocephalic and atraumatic  Right Ear: External ear normal       Left Ear: External ear normal       Nose: Nose normal    Eyes:      General:         Right eye: No discharge  Left eye: No discharge  Pupils: Pupils are equal, round, and reactive to light  Cardiovascular:      Rate and Rhythm: Normal rate and regular rhythm  Heart sounds: Normal heart sounds  No murmur heard  No friction rub  No gallop  Pulmonary:      Effort: Pulmonary effort is normal  No respiratory distress  Breath sounds: Normal breath sounds  No wheezing or rales  Abdominal:      General: Bowel sounds are normal  There is no distension  Palpations: Abdomen is soft  Tenderness: There is no abdominal tenderness  There is no guarding     Musculoskeletal: General: Normal range of motion  Cervical back: Normal range of motion and neck supple  Lymphadenopathy:      Cervical: No cervical adenopathy  Skin:     General: Skin is warm and dry  Capillary Refill: Capillary refill takes less than 2 seconds  Findings: No erythema or rash  Neurological:      Mental Status: She is alert and oriented to person, place, and time  Cranial Nerves: No cranial nerve deficit  Psychiatric:         Behavior: Behavior normal          Thought Content:  Thought content normal          Judgment: Judgment normal           DO ALEXANDER [facility]

## 2022-07-12 NOTE — PATIENT INSTRUCTIONS

## 2022-07-16 ENCOUNTER — APPOINTMENT (OUTPATIENT)
Dept: LAB | Age: 57
End: 2022-07-16
Payer: COMMERCIAL

## 2022-07-16 DIAGNOSIS — Z13.29 SCREENING FOR THYROID DISORDER: ICD-10-CM

## 2022-07-16 DIAGNOSIS — E78.5 HYPERLIPIDEMIA, UNSPECIFIED HYPERLIPIDEMIA TYPE: ICD-10-CM

## 2022-07-16 DIAGNOSIS — Z13.1 SCREENING FOR DIABETES MELLITUS: ICD-10-CM

## 2022-07-16 DIAGNOSIS — D64.9 ANEMIA, UNSPECIFIED TYPE: ICD-10-CM

## 2022-07-16 LAB
ALBUMIN SERPL BCP-MCNC: 4 G/DL (ref 3.5–5)
ALP SERPL-CCNC: 69 U/L (ref 46–116)
ALT SERPL W P-5'-P-CCNC: 32 U/L (ref 12–78)
ANION GAP SERPL CALCULATED.3IONS-SCNC: 2 MMOL/L (ref 4–13)
AST SERPL W P-5'-P-CCNC: 18 U/L (ref 5–45)
BILIRUB SERPL-MCNC: 0.52 MG/DL (ref 0.2–1)
BUN SERPL-MCNC: 20 MG/DL (ref 5–25)
CALCIUM SERPL-MCNC: 8.8 MG/DL (ref 8.3–10.1)
CHLORIDE SERPL-SCNC: 110 MMOL/L (ref 100–108)
CHOLEST SERPL-MCNC: 174 MG/DL
CO2 SERPL-SCNC: 29 MMOL/L (ref 21–32)
CREAT SERPL-MCNC: 0.72 MG/DL (ref 0.6–1.3)
ERYTHROCYTE [DISTWIDTH] IN BLOOD BY AUTOMATED COUNT: 12.8 % (ref 11.6–15.1)
EST. AVERAGE GLUCOSE BLD GHB EST-MCNC: 103 MG/DL
GFR SERPL CREATININE-BSD FRML MDRD: 93 ML/MIN/1.73SQ M
GLUCOSE P FAST SERPL-MCNC: 94 MG/DL (ref 65–99)
HBA1C MFR BLD: 5.2 %
HCT VFR BLD AUTO: 40 % (ref 34.8–46.1)
HDLC SERPL-MCNC: 49 MG/DL
HGB BLD-MCNC: 12.8 G/DL (ref 11.5–15.4)
LDLC SERPL CALC-MCNC: 106 MG/DL (ref 0–100)
MCH RBC QN AUTO: 29.1 PG (ref 26.8–34.3)
MCHC RBC AUTO-ENTMCNC: 32 G/DL (ref 31.4–37.4)
MCV RBC AUTO: 91 FL (ref 82–98)
PLATELET # BLD AUTO: 266 THOUSANDS/UL (ref 149–390)
PMV BLD AUTO: 9.9 FL (ref 8.9–12.7)
POTASSIUM SERPL-SCNC: 4.3 MMOL/L (ref 3.5–5.3)
PROT SERPL-MCNC: 7 G/DL (ref 6.4–8.2)
RBC # BLD AUTO: 4.4 MILLION/UL (ref 3.81–5.12)
SODIUM SERPL-SCNC: 141 MMOL/L (ref 136–145)
TRIGL SERPL-MCNC: 96 MG/DL
TSH SERPL DL<=0.05 MIU/L-ACNC: 0.6 UIU/ML (ref 0.45–4.5)
WBC # BLD AUTO: 3.93 THOUSAND/UL (ref 4.31–10.16)

## 2022-07-16 PROCEDURE — 84443 ASSAY THYROID STIM HORMONE: CPT

## 2022-07-16 PROCEDURE — 80053 COMPREHEN METABOLIC PANEL: CPT

## 2022-07-16 PROCEDURE — 85027 COMPLETE CBC AUTOMATED: CPT

## 2022-07-16 PROCEDURE — 80061 LIPID PANEL: CPT

## 2022-07-16 PROCEDURE — 83036 HEMOGLOBIN GLYCOSYLATED A1C: CPT

## 2022-07-16 PROCEDURE — 36415 COLL VENOUS BLD VENIPUNCTURE: CPT

## 2022-07-19 ENCOUNTER — TELEPHONE (OUTPATIENT)
Dept: OTHER | [place] OTHER | Age: 57
End: 2022-07-19

## 2022-07-22 ENCOUNTER — TELEPHONE (OUTPATIENT)
Dept: FAMILY MEDICINE CLINIC | [place] CLINIC | Age: 57
End: 2022-07-22

## 2022-07-22 NOTE — TELEPHONE ENCOUNTER
Called and spoke with pt and reviewed recent BW and x-ray to face  Pt verbalized understanding, and stated she will f/u with any additional questions or concerns

## 2022-07-22 NOTE — TELEPHONE ENCOUNTER
Patient returned call from our office (I did not see a message in her chart)  She did not know what it was about  Pt is asking for a call back within the next 1/2 hour since she is on her lunch break

## 2022-07-25 ENCOUNTER — HOSPITAL ENCOUNTER (OUTPATIENT)
Dept: RADIOLOGY | Age: 57
Discharge: HOME | End: 2022-07-25
Payer: COMMERCIAL

## 2022-07-25 DIAGNOSIS — R22.1 MASS OF LEFT SIDE OF NECK: ICD-10-CM

## 2022-07-25 PROCEDURE — 76536 US EXAM OF HEAD AND NECK: CPT

## 2022-07-26 ENCOUNTER — TELEPHONE (OUTPATIENT)
Dept: SURGICAL ONCOLOGY | [place] CLINIC | Age: 57
End: 2022-07-26

## 2022-08-04 DIAGNOSIS — E56.9 VITAMIN DEFICIENCY: Primary | ICD-10-CM

## 2022-08-04 DIAGNOSIS — K59.04 CHRONIC IDIOPATHIC CONSTIPATION: ICD-10-CM

## 2022-08-04 DIAGNOSIS — G47.00 INSOMNIA, UNSPECIFIED TYPE: ICD-10-CM

## 2022-08-04 DIAGNOSIS — G43.809 MIGRAINE VARIANT: ICD-10-CM

## 2022-08-04 DIAGNOSIS — K22.81 ESOPHAGEAL POLYP: ICD-10-CM

## 2022-08-04 RX ORDER — MULTIVIT WITH MINERALS/LUTEIN
1000 TABLET ORAL DAILY
Qty: 90 TABLET | Refills: 1 | Status: SHIPPED | OUTPATIENT
Start: 2022-08-04

## 2022-08-04 RX ORDER — KETOROLAC TROMETHAMINE 10 MG/1
10 TABLET, FILM COATED ORAL EVERY 6 HOURS PRN
Qty: 60 TABLET | Refills: 0 | Status: SHIPPED | OUTPATIENT
Start: 2022-08-04

## 2022-08-04 RX ORDER — ZINC SULFATE 66 MG
66 TABLET ORAL DAILY
Qty: 90 TABLET | Refills: 1 | Status: SHIPPED | OUTPATIENT
Start: 2022-08-04 | End: 2022-08-04

## 2022-08-04 RX ORDER — ZINC SULFATE 50(220)MG
1 CAPSULE ORAL DAILY
Qty: 30 CAPSULE | Refills: 0 | Status: SHIPPED | OUTPATIENT
Start: 2022-08-04 | End: 2022-11-09

## 2022-08-04 RX ORDER — PANTOPRAZOLE SODIUM 40 MG/1
40 TABLET, DELAYED RELEASE ORAL DAILY
Qty: 30 TABLET | Refills: 5 | Status: SHIPPED | OUTPATIENT
Start: 2022-08-04

## 2022-08-04 RX ORDER — POLYETHYLENE GLYCOL 3350 17 G/17G
17 POWDER, FOR SOLUTION ORAL DAILY
Qty: 30 EACH | Refills: 0 | Status: SHIPPED | OUTPATIENT
Start: 2022-08-04

## 2022-08-04 RX ORDER — MELATONIN
1000 DAILY
Qty: 90 TABLET | Refills: 1 | Status: SHIPPED | OUTPATIENT
Start: 2022-08-04

## 2022-08-04 RX ORDER — PHENOL 1.4 %
10 AEROSOL, SPRAY (ML) MUCOUS MEMBRANE
Qty: 30 TABLET | Refills: 0 | Status: SHIPPED | OUTPATIENT
Start: 2022-08-04

## 2022-08-04 RX ORDER — L. ACIDOPHILUS/BIFID. ANIMALIS 2B CELL
1 CAPSULE ORAL DAILY
Qty: 90 CAPSULE | Refills: 1 | Status: SHIPPED | OUTPATIENT
Start: 2022-08-04

## 2022-08-04 NOTE — TELEPHONE ENCOUNTER
Patient called to have all 8 of her scripts refilled  She then went on to mention that she is always tired and her migraines are getting worse  She also mentioned that she would like her hearing issues explained to her and at her last appt you didn't look in her mouth for any issues  She said she just wants everything explained to her thoroughly   Her number is [phone]

## 2022-08-04 NOTE — TELEPHONE ENCOUNTER
[facility] called, Zinc 66mg is not available, closest available dose is 220mg  Please advise, send new script to [facility]

## 2022-08-04 NOTE — TELEPHONE ENCOUNTER
----- Message from Beatriz Mills DO sent at 8/4/2022  1:23 PM EDT -----  Please call patient, refills were sent for her  She was given a referral to a ear nose and throat specialist   She must schedule this appointment to further evaluate her upper airway  Her headaches have been complex in the past   Please advise her that she should follow-up with her neurologist to further evaluate this problem    Thank you

## 2022-08-24 ENCOUNTER — TELEPHONE (OUTPATIENT)
Dept: FAMILY MEDICINE CLINIC | [place] CLINIC | Age: 57
End: 2022-08-24

## 2022-08-24 NOTE — TELEPHONE ENCOUNTER
Pt called with questions r/t to her US of her head and neck  Results reviewed and denies further questions at this time  Pt asked if getting a tooth implant on the bottom left gum is going to affect her upcoming MRI  Pt was adamant that this get run by you so she has a response from a doctor  Please advise, thank you!

## 2022-08-24 NOTE — TELEPHONE ENCOUNTER
Please have her check with her dental specialist to see what material will be used for the implant    Thank you

## 2022-09-11 ENCOUNTER — HOSPITAL ENCOUNTER (OUTPATIENT)
Dept: MRI IMAGING | [place] HOSPITAL | Age: 57
Discharge: HOME | End: 2022-09-11
Attending: SURGERY

## 2022-09-11 DIAGNOSIS — K86.2 CYST OF PANCREAS: ICD-10-CM

## 2022-09-16 DIAGNOSIS — Z12.31 ENCOUNTER FOR SCREENING MAMMOGRAM FOR MALIGNANT NEOPLASM OF BREAST: ICD-10-CM

## 2022-10-03 NOTE — PROGRESS NOTES
Fernando Marcial is a 62 y o  female here for "cervical cyst" that she has had for the last 1 5 yrs  She states that it is not painful and she has had no abnormal periods  Back when she was 23 yo she had "cancer cells on my cervix" and had surgery to remove them and was given a thick cream  She denies any other gyn surgeries  She also endorses a "weird stomach pain" on the right side, that goes away randomly without medication  Gynecologic History  LMP 3/2022 7 months ago  Contraception: none, sexually active  Last Pap:   Results were: normal  Last mammogram: 22  Results were: normal    Obstetric History  OB History    Para Term  AB Living   18 10 10   8 10   SAB IAB Ectopic Multiple Live Births   7   1   10      # Outcome Date GA Lbr Satya/2nd Weight Sex Delivery Anes PTL Lv   18 2016           17 Term 12 40w1d  3915 g (8 lb 10 1 oz) M Vag-Spont EPI  MEREDITH   16 Term 01/08/10 39w0d  3900 g (8 lb 9 6 oz) F Vag-Spont EPI  MEREDITH   15 2008           14 Term 07 41w0d  4281 g (9 lb 7 oz) M  EPI  MEREDITH   13 SAB 2007           12 Term 2005 40w0d    Vag-Spont None  MEREDITH   11 SAB 2005           10 SAB 2005           9 Term  40w0d  3175 g (7 lb) F Vag-Spont None  MEREDITH   8 SAB            7 SAB            6 Term  40w0d  3175 g (7 lb) F Vag-Spont   MEREDITH   5 Term    3629 g (8 lb) M Vag-Spont EPI  MEREDITH   4 Term  40w0d  4082 g (9 lb) F Vag-Spont EPI  MEREDITH   3 Ectopic  12w0d          2 Term    2523 g (5 lb 9 oz) F Vag-Spont None  MEREDITH   1 Term  40w0d  2722 g (6 lb) M Vag-Spont None  MEREDITH     Review of Systems  Pertinent items are noted in HPI  Objective     /75   Pulse 84   Ht 5' 5" (1 651 m)   Wt 73 8 kg (162 lb 12 8 oz)   LMP 2019   BMI 27 09 kg/m²          Physical Exam  Vitals and nursing note reviewed  Constitutional:       General: She is not in acute distress  Appearance: She is well-developed     HENT:      Head: Normocephalic and atraumatic  Eyes:      Conjunctiva/sclera: Conjunctivae normal    Cardiovascular:      Rate and Rhythm: Normal rate and regular rhythm  Heart sounds: No murmur heard  Pulmonary:      Effort: Pulmonary effort is normal  No respiratory distress  Breath sounds: Normal breath sounds  Abdominal:      Palpations: Abdomen is soft  Tenderness: There is no abdominal tenderness  Genitourinary:     General: Normal vulva  Comments: Normal appearing external genitalia, closed appearing multiparous cervix with large nabothian cysts at 6:00, minimal vaginal discharge    Musculoskeletal:      Cervical back: Neck supple  Skin:     General: Skin is warm and dry  Neurological:      Mental Status: She is alert  Psychiatric:         Mood and Affect: Mood normal          Behavior: Behavior normal        Cervical Biopsy    Date/Time: 10/4/2022 3:29 PM  Performed by: Tarun Martin MD  Authorized by: Tarun Martin MD   Universal Protocol:  Procedure performed by: (Dr Brannon Mayfield)  Consent: Verbal consent obtained  Risks and benefits: risks, benefits and alternatives were discussed  Consent given by: patient  Time out: Immediately prior to procedure a "time out" was called to verify the correct patient, procedure, equipment, support staff and site/side marked as required  Patient understanding: patient states understanding of the procedure being performed  Patient consent: the patient's understanding of the procedure matches consent given  Procedure consent: procedure consent matches procedure scheduled  Relevant documents: relevant documents present and verified  Test results: test results available and properly labeled  Site marked: the operative site was not marked  Radiology Images displayed and confirmed   If images not available, report reviewed: imaging studies not available  Patient identity confirmed: verbally with patient      Procedure Details - Skin Biopsy:     Biopsy tissue type: cervix  Biopsy method: punch biopsy      Malignancy: benign lesion        Assessment    Juni is a 61 yo [id number] here for a cervical cyst, likely a nabothian cyst  This cyst was biopsied and a pap smear was performed  Plan     Follow up in: 1 year  for annual exam  Will call with results of biopsy and pap smear

## 2022-10-04 ENCOUNTER — OFFICE VISIT (OUTPATIENT)
Dept: OBGYN CLINIC | [place] CLINIC | Age: 57
End: 2022-10-04

## 2022-10-04 VITALS
HEART RATE: 84 BPM | DIASTOLIC BLOOD PRESSURE: 75 MMHG | WEIGHT: 162.8 LBS | BODY MASS INDEX: 27.12 KG/M2 | HEIGHT: 65 IN | SYSTOLIC BLOOD PRESSURE: 112 MMHG

## 2022-10-04 DIAGNOSIS — Z12.4 CERVICAL CANCER SCREENING: ICD-10-CM

## 2022-10-04 DIAGNOSIS — N88.8 NABOTHIAN CYST: Primary | ICD-10-CM

## 2022-10-04 PROCEDURE — 88305 TISSUE EXAM BY PATHOLOGIST: CPT | Performed by: PATHOLOGY

## 2022-10-04 PROCEDURE — G0145 SCR C/V CYTO,THINLAYER,RESCR: HCPCS

## 2022-10-04 PROCEDURE — 99203 OFFICE O/P NEW LOW 30 MIN: CPT | Performed by: OBSTETRICS & GYNECOLOGY

## 2022-10-04 PROCEDURE — G0476 HPV COMBO ASSAY CA SCREEN: HCPCS

## 2022-10-04 RX ORDER — CLINDAMYCIN HYDROCHLORIDE 150 MG/1
CAPSULE ORAL
COMMUNITY
Start: 2022-08-23

## 2022-10-04 RX ORDER — IBUPROFEN 800 MG/1
TABLET ORAL
COMMUNITY
Start: 2022-08-23

## 2022-10-04 NOTE — LETTER
October 4, 2022     Patient: Zandra Brice  YOB: 1965  Date of Visit: 10/4/2022      To Whom it May Concern:    Chris Sharma is under my professional care  Yuni Espino was seen in my office on 10/4/2022  Yuni Espino may return to work on 10/5/22  If you have any questions or concerns, please don't hesitate to call           Sincerely,          Casey Paez MD        CC: No Recipients

## 2022-10-06 LAB
HPV HR 12 DNA CVX QL NAA+PROBE: NEGATIVE
HPV16 DNA CVX QL NAA+PROBE: NEGATIVE
HPV18 DNA CVX QL NAA+PROBE: NEGATIVE

## 2022-10-12 LAB
LAB AP GYN PRIMARY INTERPRETATION: NORMAL
Lab: NORMAL

## 2022-10-25 ENCOUNTER — ANNUAL EXAM (OUTPATIENT)
Dept: OBGYN CLINIC | [place] CLINIC | Age: 57
End: 2022-10-25

## 2022-10-25 VITALS
HEIGHT: 65 IN | SYSTOLIC BLOOD PRESSURE: 145 MMHG | DIASTOLIC BLOOD PRESSURE: 93 MMHG | HEART RATE: 81 BPM | BODY MASS INDEX: 27.22 KG/M2 | WEIGHT: 163.4 LBS

## 2022-10-25 DIAGNOSIS — Z11.3 SCREENING FOR STD (SEXUALLY TRANSMITTED DISEASE): Primary | ICD-10-CM

## 2022-10-25 DIAGNOSIS — N93.9 ABNORMAL UTERINE BLEEDING: ICD-10-CM

## 2022-10-25 PROCEDURE — 87591 N.GONORRHOEAE DNA AMP PROB: CPT

## 2022-10-25 PROCEDURE — 87491 CHLMYD TRACH DNA AMP PROBE: CPT

## 2022-10-25 PROCEDURE — 99396 PREV VISIT EST AGE 40-64: CPT | Performed by: OBSTETRICS & GYNECOLOGY

## 2022-10-25 RX ORDER — SODIUM FLUORIDE 6 MG/ML
PASTE, DENTIFRICE DENTAL
COMMUNITY
Start: 2022-10-10

## 2022-10-25 NOTE — PROGRESS NOTES
Subjective      Sarah Eldridge is a 62 y o  female who presents for annual well woman exam     GYN:  · States that she gets a period once every 9-10 months  LMP 9 months ago  · Isn't quite sure when her last periods are, but they have been less than 12 months  · Has cramping and periods last approximately 2-3 days  · No vaginal discharge, labial erythema or lesions, dyspareunia  · Contraception: condoms mostly  · Patient is sexually active with 4-5 partners  · No gynecologic surgeries  OB:  · [id number] female  · Pregnancies were all SVDs  :  · No dysuria, urinary frequency or urgency  · No hematuria, flank pain, incontinence  Breast:  · No breast mass, skin changes, dimpling, reddening, nipple retraction  · No breast discharge  · Patient does not have a family history of breast, endometrial, or ovarian ca  General:  · Diet: patient endorses that it is great  Balanced diet, drinks a lot of water  · Exercise: Uses an exercise bike daily, and dances 3-4x a week  · Work: does a physical job, medical assisting   · ETOH use: no  · Tobacco use: no, quit when she was 23 yo  · Recreational drug use: no    Screening:  · Cervical cancer: last pap smear in 10/4/22  Results were NILM  · Breast cancer: last mammogram in 9/12/22  Results were Normal   · Colon cancer: last colonoscopy in 2020  Results were normal  Patient to follow up in 5 years due to a personal history of colon polyps  · STD screening: interested in gonorrhea and chlamydia testing  Review of Systems   Constitutional: Negative for chills and fever  HENT: Negative for ear pain and sore throat  Eyes: Negative for pain and visual disturbance  Respiratory: Negative for cough and shortness of breath  Cardiovascular: Negative for chest pain and palpitations  Gastrointestinal: Negative for abdominal pain and vomiting  Genitourinary: Negative for dysuria and hematuria     Musculoskeletal: Negative for arthralgias and back pain    Skin: Negative for color change and rash  Neurological: Negative for seizures and syncope  All other systems reviewed and are negative  Objective      /93   Pulse 81   Ht 5' 5" (1 651 m)   Wt 74 1 kg (163 lb 6 4 oz)   LMP 04/30/2019   BMI 27 19 kg/m²   Physical Exam  Vitals and nursing note reviewed  Constitutional:       General: She is not in acute distress  Appearance: Normal appearance  She is well-developed  Eyes:      Conjunctiva/sclera: Conjunctivae normal    Cardiovascular:      Rate and Rhythm: Normal rate and regular rhythm  Heart sounds: Normal heart sounds  No murmur heard  Pulmonary:      Effort: Pulmonary effort is normal  No respiratory distress  Breath sounds: Normal breath sounds  No wheezing  Abdominal:      Palpations: Abdomen is soft  Genitourinary:     Comments: Deferred pelvic exam, as patient had recent exam approximately 2-3 weeks ago  Musculoskeletal:      Cervical back: Neck supple  Skin:     General: Skin is warm and dry  Neurological:      Mental Status: She is alert  Psychiatric:         Mood and Affect: Mood normal         Assessment/Plan    - Patient is unclear as to if she is postmenopausal or not   Recommend transvaginal ultrasound to assess uterine lining as it is unclear if she is having AUB or is perimenopausal     Problem List Items Addressed This Visit    None     Visit Diagnoses     Screening for STD (sexually transmitted disease)    -  Primary    Relevant Orders    Chlamydia/GC amplified DNA by PCR    Abnormal uterine bleeding        Relevant Orders    US pelvis complete w transvaginal        Ever Mcneal MD  OB/GYN PGY-2  10/25/2022  3:27 PM

## 2022-10-26 ENCOUNTER — HOSPITAL ENCOUNTER (OUTPATIENT)
Dept: RADIOLOGY | [place] HOSPITAL | Age: 57
Discharge: HOME | End: 2022-10-26
Attending: SURGERY
Payer: COMMERCIAL

## 2022-10-26 LAB
C TRACH DNA SPEC QL NAA+PROBE: NEGATIVE
N GONORRHOEA DNA SPEC QL NAA+PROBE: NEGATIVE

## 2022-10-26 PROCEDURE — G1004 CDSM NDSC: HCPCS

## 2022-10-26 PROCEDURE — A9585 GADOBUTROL INJECTION: HCPCS | Performed by: SURGERY

## 2022-10-26 PROCEDURE — 74183 MRI ABD W/O CNTR FLWD CNTR: CPT

## 2022-10-26 RX ADMIN — GADOBUTROL 8 ML: 604.72 INJECTION INTRAVENOUS at 07:27

## 2022-10-28 ENCOUNTER — TELEPHONE (OUTPATIENT)
Dept: SURGICAL ONCOLOGY | [place] CLINIC | Age: 57
End: 2022-10-28

## 2022-11-08 ENCOUNTER — HOSPITAL ENCOUNTER (OUTPATIENT)
Dept: RADIOLOGY | Age: 57
Discharge: HOME | End: 2022-11-08

## 2022-11-08 ENCOUNTER — OFFICE VISIT (OUTPATIENT)
Dept: SURGICAL ONCOLOGY | [place] CLINIC | Age: 57
End: 2022-11-08

## 2022-11-08 VITALS
DIASTOLIC BLOOD PRESSURE: 80 MMHG | RESPIRATION RATE: 17 BRPM | HEART RATE: 80 BPM | HEIGHT: 65 IN | SYSTOLIC BLOOD PRESSURE: 136 MMHG | OXYGEN SATURATION: 98 % | BODY MASS INDEX: 27.49 KG/M2 | WEIGHT: 165 LBS

## 2022-11-08 DIAGNOSIS — N93.9 ABNORMAL UTERINE BLEEDING: ICD-10-CM

## 2022-11-08 DIAGNOSIS — L98.9 BENIGN SKIN LESION OF THIGH: ICD-10-CM

## 2022-11-08 DIAGNOSIS — K86.2 PANCREAS CYST: Primary | ICD-10-CM

## 2022-11-09 PROBLEM — L98.9 BENIGN SKIN LESION OF THIGH: Status: ACTIVE | Noted: 2022-11-09
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
## 2022-12-13 ENCOUNTER — TELEPHONE (OUTPATIENT)
Dept: OBGYN CLINIC | [place] CLINIC | Age: 57
End: 2022-12-13

## 2022-12-13 NOTE — TELEPHONE ENCOUNTER
----- Message from Aliyah Christianson MD sent at 12/12/2022  7:08 PM EST -----  Can you please let Malcom Bob know that her pelvic ultrasound was normal  Thank you      -Ijeoma  ----- Message -----  From: Interface, Radiology Results In  Sent: 11/11/2022   5:19 PM EST  To: Aliyah Christianson MD

## 2023-02-28 ENCOUNTER — TELEPHONE (OUTPATIENT)
Dept: NEUROLOGY | Facility: CLINIC | Age: 58
End: 2023-02-28

## 2023-02-28 NOTE — TELEPHONE ENCOUNTER
Called patient and mm offering patient a sooner appointment on either 3/7/23 for 3/8/23 with Dr Keya Mendez  Informed patient to please call the office back if she wanted to accept the earlier appointment

## 2023-03-29 ENCOUNTER — TELEPHONE (OUTPATIENT)
Dept: NEUROLOGY | Facility: CLINIC | Age: 58
End: 2023-03-29

## 2023-03-29 NOTE — TELEPHONE ENCOUNTER
Called and spoke to patient to confirm their upcoming appointment with Dr Yessica Machado  Informed patient about arriving in the [place] location 15 minutes prior to their appointment to get checked in and going over chart

## 2023-10-12 ENCOUNTER — TELEPHONE (OUTPATIENT)
Dept: NEUROLOGY | Facility: CLINIC | Age: 58
End: 2023-10-12

## 2023-10-12 NOTE — TELEPHONE ENCOUNTER
Attempted to reach patient on both work and mobile phone number yesterday and today but numbers state the person is not accepting calls at this time. I was attempting to reach this patient to offer an earlier appt. LMOM for alternate contact to verify patient's phone number or provide updated contact information.

## 2023-11-06 ENCOUNTER — TELEPHONE (OUTPATIENT)
Dept: HEMATOLOGY ONCOLOGY | Facility: CLINIC | Age: 58
End: 2023-11-06

## 2023-11-06 ENCOUNTER — HOSPITAL ENCOUNTER (OUTPATIENT)
Dept: RADIOLOGY | Facility: HOSPITAL | Age: 58
Discharge: HOME | End: 2023-11-06
Payer: COMMERCIAL

## 2023-11-06 DIAGNOSIS — K86.2 PANCREAS CYST: ICD-10-CM

## 2023-11-06 PROCEDURE — 74183 MRI ABD W/O CNTR FLWD CNTR: CPT

## 2023-11-06 PROCEDURE — G1004 CDSM NDSC: HCPCS

## 2023-11-06 PROCEDURE — A9585 GADOBUTROL INJECTION: HCPCS

## 2023-11-06 RX ORDER — GADOBUTROL 604.72 MG/ML
7 INJECTION INTRAVENOUS
Status: COMPLETED | OUTPATIENT
Start: 2023-11-06 | End: 2023-11-06

## 2023-11-06 RX ADMIN — GADOBUTROL 7 ML: 604.72 INJECTION INTRAVENOUS at 05:56

## 2023-11-06 NOTE — TELEPHONE ENCOUNTER
Appointment Change  Cancel, Reschedule, Change to Virtual      Who are you speaking with? Patient   If it is not the patient, is the caller listed on the communication consent form? N/A   Which provider is the appointment scheduled with? CAYDEN Gardner   When was the original appointment scheduled? Please list date and time 11/14/23 330   At which location is the appointment scheduled to take place? [id number]   Was the appointment rescheduled? Was the appointment changed from an in person visit to a virtual visit? If so, please list the details of the change. 11/21/23 330   What is the reason for the appointment change? Pt requested Dr. Margy Connell       Was [facility] transport scheduled? N/A   Does [facility] transport need to be scheduled for the new visit (if applicable) N/A   Does the patient need an infusion appointment rescheduled? N/A   Does the patient have an upcoming infusion appointment scheduled? If so, when? No   Is the patient undergoing chemotherapy? N/A   For appointments cancelled with less than 24 hours:  Was the no-show policy reviewed?  N/A

## 2023-11-09 ENCOUNTER — TELEPHONE (OUTPATIENT)
Dept: SURGICAL ONCOLOGY | Facility: CLINIC | Age: 58
End: 2023-11-09

## 2023-11-09 NOTE — TELEPHONE ENCOUNTER
Called patient and rescheduled appt on 11/21 to an earlier time at 2:30PM due to Dr. Juli Rice not in the office after 3PM. Patient stated she will leave work earlier to make it to the appt.

## 2023-11-16 ENCOUNTER — TELEPHONE (OUTPATIENT)
Dept: SURGICAL ONCOLOGY | Facility: CLINIC | Age: 58
End: 2023-11-16

## 2023-11-16 NOTE — TELEPHONE ENCOUNTER
Called patient and rescheduled appt on 11/21 with Dr Marycarmen Pagan due to a meeting, patient is now scheduled to be seen with Sunita on 11/28 in [facility] which was convenient with patient's schedule.

## 2023-11-28 ENCOUNTER — OFFICE VISIT (OUTPATIENT)
Dept: SURGICAL ONCOLOGY | Facility: CLINIC | Age: 58
End: 2023-11-28
Payer: COMMERCIAL

## 2023-11-28 VITALS
HEART RATE: 74 BPM | BODY MASS INDEX: 29.2 KG/M2 | SYSTOLIC BLOOD PRESSURE: 120 MMHG | TEMPERATURE: 98.4 F | OXYGEN SATURATION: 88 % | DIASTOLIC BLOOD PRESSURE: 76 MMHG | WEIGHT: 175.5 LBS

## 2023-11-28 DIAGNOSIS — K86.2 PANCREAS CYST: Primary | ICD-10-CM

## 2023-11-28 PROCEDURE — 99213 OFFICE O/P EST LOW 20 MIN: CPT

## 2023-11-28 NOTE — LETTER
November 28, 2023     Patient: Herlinda Perdomo  YOB: 1965  Date of Visit: 11/28/2023      To Whom it May Concern:    Adriennewolfgang Lopez is under my professional care. Babitahakan Prasadririddhi was seen in my office on 11/28/2023. Please excuse her early departure from work for this medically necessary appointment. If you have any questions or concerns, please don't hesitate to call.          Sincerely,          CAYDEN Montesinos        CC: No Recipients
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
## 2023-12-05 ENCOUNTER — OFFICE VISIT (OUTPATIENT)
Dept: FAMILY MEDICINE CLINIC | Facility: CLINIC | Age: 58
End: 2023-12-05
Payer: COMMERCIAL

## 2023-12-05 VITALS
HEIGHT: 65 IN | BODY MASS INDEX: 28.82 KG/M2 | SYSTOLIC BLOOD PRESSURE: 128 MMHG | WEIGHT: 173 LBS | HEART RATE: 72 BPM | TEMPERATURE: 97.8 F | DIASTOLIC BLOOD PRESSURE: 72 MMHG

## 2023-12-05 DIAGNOSIS — Z00.00 ANNUAL PHYSICAL EXAM: Primary | ICD-10-CM

## 2023-12-05 DIAGNOSIS — Z13.1 SCREENING FOR DIABETES MELLITUS: ICD-10-CM

## 2023-12-05 DIAGNOSIS — Z13.29 SCREENING FOR THYROID DISORDER: ICD-10-CM

## 2023-12-05 DIAGNOSIS — E78.5 HYPERLIPIDEMIA, UNSPECIFIED HYPERLIPIDEMIA TYPE: ICD-10-CM

## 2023-12-05 DIAGNOSIS — M79.89 LEG SWELLING: ICD-10-CM

## 2023-12-05 DIAGNOSIS — Z11.1 SCREENING FOR TUBERCULOSIS: ICD-10-CM

## 2023-12-05 DIAGNOSIS — D64.9 ANEMIA, UNSPECIFIED TYPE: ICD-10-CM

## 2023-12-05 PROCEDURE — 99396 PREV VISIT EST AGE 40-64: CPT | Performed by: FAMILY MEDICINE

## 2023-12-05 NOTE — PROGRESS NOTES
ADULT ANNUAL [address] [facility] [employer]    NAME: Courtney Busby  AGE: 62 y.o. SEX: female  : 1965     DATE: 2023     Assessment and Plan:     Problem List Items Addressed This Visit          Other    Hyperlipidemia    Relevant Orders    Lipid Panel with Direct LDL reflex    Anemia    Relevant Orders    CBC     Other Visit Diagnoses       Annual physical exam    -  Primary    BMI 28.0-28.9,adult        Screening for thyroid disorder        Relevant Orders    TSH, 3rd generation with Free T4 reflex    Screening for diabetes mellitus        Relevant Orders    Comprehensive metabolic panel    Hemoglobin A1C    Screening for tuberculosis        Relevant Orders    Quantiferon TB Gold Plus Assay    Leg swelling        Relevant Orders    VAS lower limb venous duplex study, complete bilateral              Immunizations and preventive care screenings were discussed with patient today. Appropriate education was printed on patient's after visit summary. Counseling:  Alcohol/drug use: discussed moderation in alcohol intake, the recommendations for healthy alcohol use, and avoidance of illicit drug use. Dental Health: discussed importance of regular tooth brushing, flossing, and dental visits. Injury prevention: discussed safety/seat belts, safety helmets, smoke detectors, carbon dioxide detectors, and smoking near bedding or upholstery. Sexual health: discussed sexually transmitted diseases, partner selection, use of condoms, avoidance of unintended pregnancy, and contraceptive alternatives. Exercise: the importance of regular exercise/physical activity was discussed. Recommend exercise 3-5 times per week for at least 30 minutes. Depression Screening and Follow-up Plan: Patient was screened for depression during today's encounter. They screened negative with a PHQ-2 score of 0. Return in 1 year (on 2024).      Chief Complaint:     Chief Complaint   Patient presents with    Physical Exam    Leg Pain     Left calf    Nail Problem     Bilateral great toes    Earache     Left ear      History of Present Illness:     Adult Annual Physical   Patient here for a comprehensive physical exam. The patient reports no problems. Diet and Physical Activity  Diet/Nutrition: well balanced diet. Exercise: walking. Depression Screening  PHQ-2/9 Depression Screening    Little interest or pleasure in doing things: 0 - not at all  Feeling down, depressed, or hopeless: 0 - not at all  PHQ-2 Score: 0  PHQ-2 Interpretation: Negative depression screen       General Health  Sleep: sleeps well. Hearing: normal - bilateral.  Vision: no vision problems. Dental: regular dental visits. /GYN Health  Follows with gynecology? yes   Patient is: postmenopausal           Review of Systems:     Review of Systems   Constitutional:  Negative for activity change, chills, fatigue and fever. HENT:  Negative for congestion, ear pain, sinus pressure and sore throat. Eyes:  Negative for redness, itching and visual disturbance. Respiratory:  Negative for cough and shortness of breath. Cardiovascular:  Negative for chest pain and palpitations. Gastrointestinal:  Negative for abdominal pain, diarrhea and nausea. Endocrine: Negative for cold intolerance and heat intolerance. Genitourinary:  Negative for dysuria, flank pain and frequency. Musculoskeletal:  Negative for arthralgias, back pain, gait problem and myalgias. Skin:  Negative for color change. Allergic/Immunologic: Negative for environmental allergies. Neurological:  Negative for dizziness, numbness and headaches. Psychiatric/Behavioral:  Negative for behavioral problems and sleep disturbance.        Past Medical History:     Past Medical History:   Diagnosis Date    Colon polyp     Head injury     Knee injury     right knee    Migraine       Past Surgical History:     Past Surgical History: Procedure Laterality Date    COLONOSCOPY      LAPAROSCOPY      Exploratory    [place] ESOPHAGOGASTRODUODENOSCOPY TRANSORAL DIAGNOSTIC N/A 2018    Procedure: ESOPHAGOGASTRODUODENOSCOPY (EGD); Surgeon: Adore Long MD;  Location: [facility] GI [facility];   Service: Gastroenterology      Social History:     Social History     Socioeconomic History    Marital status:      Spouse name: None    Number of children: None    Years of education: None    Highest education level: None   Occupational History    None   Tobacco Use    Smoking status: Former     Types: Cigarettes     Quit date:      Years since quittin.9    Smokeless tobacco: Never    Tobacco comments:     Quit   Vaping Use    Vaping Use: Never used   Substance and Sexual Activity    Alcohol use: Yes     Comment: social    Drug use: No    Sexual activity: Yes     Partners: Male     Birth control/protection: None   Other Topics Concern    None   Social History Narrative    Consume 1 cup of coffee per day    Uses safety equipment: seatbelt     Social Determinants of Health     Financial Resource Strain: Not on file   Food Insecurity: Not on file   Transportation Needs: Not on file   Physical Activity: Not on file   Stress: Not on file   Social Connections: Not on file   Intimate Partner Violence: Not on file   Housing Stability: Not on file      Family History:     Family History   Problem Relation Age of Onset    Diabetes Mother     Hypertension Mother     Heart disease Mother     Hypertension Sister     Dementia Sister     Diabetes Brother     Hypertension Brother     Heart disease Brother     Heart disease Maternal Grandmother     Stroke Family     Arthritis Family     Colon cancer Neg Hx       Current Medications:     Current Outpatient Medications   Medication Sig Dispense Refill    Probiotic Product (TruBiotics) CAPS Take 1 capsule by mouth daily (Patient not taking: Reported on 2023) 90 capsule 1     No current facility-administered medications for this visit. Allergies: Allergies   Allergen Reactions    Aspirin Anaphylaxis    Penicillin V Anaphylaxis      Physical Exam:     /72 (BP Location: Left arm, Patient Position: Sitting, Cuff Size: Standard)   Pulse 72   Temp 97.8 °F (36.6 °C) (Temporal)   Ht 5' 5" (1.651 m)   Wt 78.5 kg (173 lb)   LMP 04/30/2019   BMI 28.79 kg/m²     Physical Exam  Vitals reviewed. Constitutional:       General: She is not in acute distress. Appearance: She is well-developed. She is not diaphoretic. HENT:      Head: Normocephalic and atraumatic. Right Ear: External ear normal.      Left Ear: External ear normal.      Nose: Nose normal.   Eyes:      General: Lids are normal.         Right eye: No discharge. Left eye: No discharge. Extraocular Movements: Extraocular movements intact. Conjunctiva/sclera: Conjunctivae normal.      Pupils: Pupils are equal, round, and reactive to light. Cardiovascular:      Rate and Rhythm: Normal rate and regular rhythm. Pulses: Normal pulses. Dorsalis pedis pulses are 2+ on the right side and 2+ on the left side. Posterior tibial pulses are 2+ on the right side and 2+ on the left side. Heart sounds: Normal heart sounds. No murmur heard. No friction rub. No gallop. Pulmonary:      Effort: Pulmonary effort is normal. No respiratory distress. Breath sounds: Normal breath sounds. No wheezing or rales. Abdominal:      General: Bowel sounds are normal. There is no distension. Palpations: Abdomen is soft. Tenderness: There is no abdominal tenderness. There is no guarding. Musculoskeletal:         General: Normal range of motion. Cervical back: Normal range of motion and neck supple. Right lower leg: No edema. Left lower leg: No edema. Lymphadenopathy:      Cervical: No cervical adenopathy. Skin:     General: Skin is warm and dry.       Capillary Refill: Capillary refill takes less than 2 seconds. Findings: No erythema or rash. Neurological:      Mental Status: She is alert and oriented to person, place, and time. Cranial Nerves: No cranial nerve deficit. Psychiatric:         Attention and Perception: Attention and perception normal.         Mood and Affect: Mood and affect normal.         Behavior: Behavior normal.         Thought Content:  Thought content normal.         Cognition and Memory: Cognition and memory normal.         Judgment: Judgment normal.          ab Shin,   [facility] [address]

## 2023-12-05 NOTE — PATIENT INSTRUCTIONS
Wellness Visit for Adults   AMBULATORY CARE:   A wellness visit  is when you see your healthcare provider to get screened for health problems. Your healthcare provider will also give you advice on how to stay healthy. Write down your questions so you remember to ask them. Ask your healthcare provider how often you should have a wellness visit. What happens at a wellness visit:  Your healthcare provider will ask about your health, and your family history of health problems. This includes high blood pressure, heart disease, and cancer. He or she will ask if you have symptoms that concern you, if you smoke, and about your mood. You may also be asked about your intake of medicines, supplements, food, and alcohol. Any of the following may be done: Your weight  will be checked. Your height may also be checked so your body mass index (BMI) can be calculated. Your BMI shows if you are at a healthy weight. Your blood pressure  and heart rate will be checked. Your temperature may also be checked. Blood and urine tests  may be done. Blood tests may be done to check your cholesterol levels. Abnormal cholesterol levels increase your risk for heart disease and stroke. You may also need a blood or urine test to check for diabetes if you are at increased risk. Urine tests may be done to look for signs of an infection or kidney disease. A physical exam  includes checking your heartbeat and lungs with a stethoscope. Your healthcare provider may also check your skin to look for sun damage. Screening tests  may be recommended. A screening test is done to check for diseases that may not cause symptoms. The screening tests you may need depend on your age, gender, family history, and lifestyle habits. For example, colorectal screening may be recommended if you are 48years old or older. Screening tests you need if you are a woman:   A Pap smear  is used to screen for cervical cancer.  Pap smears are usually done every 3 to 5 years depending on your age. You may need them more often if you have had abnormal Pap smear test results in the past. Ask your healthcare provider how often you should have a Pap smear. A mammogram  is an x-ray of your breasts to screen for breast cancer. Experts recommend mammograms every 2 years starting at age 48 years. You may need a mammogram at age 52 years or younger if you have an increased risk for breast cancer. Talk to your healthcare provider about when you should start having mammograms and how often you need them. Vaccines you may need:   Get an influenza vaccine  every year. The influenza vaccine protects you from the flu. Several types of viruses cause the flu. The viruses change over time, so new vaccines are made each year. Get a tetanus-diphtheria (Td) booster vaccine  every 10 years. This vaccine protects you against tetanus and diphtheria. Tetanus is a severe infection that may cause painful muscle spasms and lockjaw. Diphtheria is a severe bacterial infection that causes a thick covering in the back of your mouth and throat. Get a human papillomavirus (HPV) vaccine  if you are female and aged 23 to 32 or male 23 to 24 and never received it. This vaccine protects you from HPV infection. HPV is the most common infection spread by sexual contact. HPV may also cause vaginal, penile, and anal cancers. Get a pneumococcal vaccine  if you are aged 72 years or older. The pneumococcal vaccine is an injection given to protect you from pneumococcal disease. Pneumococcal disease is an infection caused by pneumococcal bacteria. The infection may cause pneumonia, meningitis, or an ear infection. Get a shingles vaccine  if you are 60 or older, even if you have had shingles before. The shingles vaccine is an injection to protect you from the varicella-zoster virus. This is the same virus that causes chickenpox.  Shingles is a painful rash that develops in people who had chickenpox or have been exposed to the virus. How to eat healthy:  My Plate is a model for planning healthy meals. It shows the types and amounts of foods that should go on your plate. Fruits and vegetables make up about half of your plate, and grains and protein make up the other half. A serving of dairy is included on the side of your plate. The amount of calories and serving sizes you need depends on your age, gender, weight, and height. Examples of healthy foods are listed below:  Eat a variety of vegetables  such as dark green, red, and orange vegetables. You can also include canned vegetables low in sodium (salt) and frozen vegetables without added butter or sauces. Eat a variety of fresh fruits , canned fruit in 100% juice, frozen fruit, and dried fruit. Include whole grains. At least half of the grains you eat should be whole grains. Examples include whole-wheat bread, wheat pasta, brown rice, and whole-grain cereals such as oatmeal.    Eat a variety of protein foods such as seafood (fish and shellfish), lean meat, and poultry without skin (turkey and chicken). Examples of lean meats include pork leg, shoulder, or tenderloin, and beef round, sirloin, tenderloin, and extra lean ground beef. Other protein foods include eggs and egg substitutes, beans, peas, soy products, nuts, and seeds. Choose low-fat dairy products such as skim or 1% milk or low-fat yogurt, cheese, and cottage cheese. Limit unhealthy fats  such as butter, hard margarine, and shortening. Exercise:  Exercise at least 30 minutes per day on most days of the week. Some examples of exercise include walking, biking, dancing, and swimming. You can also fit in more physical activity by taking the stairs instead of the elevator or parking farther away from stores. Include muscle strengthening activities 2 days each week. Regular exercise provides many health benefits.  It helps you manage your weight, and decreases your risk for type 2 diabetes, heart disease, stroke, and high blood pressure. Exercise can also help improve your mood. Ask your healthcare provider about the best exercise plan for you. General health and safety guidelines:   Do not smoke. Nicotine and other chemicals in cigarettes and cigars can cause lung damage. Ask your healthcare provider for information if you currently smoke and need help to quit. E-cigarettes or smokeless tobacco still contain nicotine. Talk to your healthcare provider before you use these products. Limit alcohol. A drink of alcohol is 12 ounces of beer, 5 ounces of wine, or 1½ ounces of liquor. Lose weight, if needed. Being overweight increases your risk of certain health conditions. These include heart disease, high blood pressure, type 2 diabetes, and certain types of cancer. Protect your skin. Do not sunbathe or use tanning beds. Use sunscreen with a SPF 15 or higher. Apply sunscreen at least 15 minutes before you go outside. Reapply sunscreen every 2 hours. Wear protective clothing, hats, and sunglasses when you are outside. Drive safely. Always wear your seatbelt. Make sure everyone in your car wears a seatbelt. A seatbelt can save your life if you are in an accident. Do not use your cell phone when you are driving. This could distract you and cause an accident. Pull over if you need to make a call or send a text message. Practice safe sex. Use latex condoms if are sexually active and have more than one partner. Your healthcare provider may recommend screening tests for sexually transmitted infections (STIs). Wear helmets, lifejackets, and protective gear. Always wear a helmet when you ride a bike or motorcycle, go skiing, or play sports that could cause a head injury. Wear protective equipment when you play sports. Wear a lifejacket when you are on a boat or doing water sports.     © Copyright [employer] 2023 Information is for End User's use only and may not be sold, redistributed or otherwise used for commercial purposes. The above information is an  only. It is not intended as medical advice for individual conditions or treatments. Talk to your doctor, nurse or pharmacist before following any medical regimen to see if it is safe and effective for you.

## 2023-12-11 ENCOUNTER — APPOINTMENT (OUTPATIENT)
Dept: LAB | Age: 58
End: 2023-12-11
Payer: COMMERCIAL

## 2023-12-11 DIAGNOSIS — E78.5 HYPERLIPIDEMIA, UNSPECIFIED HYPERLIPIDEMIA TYPE: ICD-10-CM

## 2023-12-11 DIAGNOSIS — Z13.29 SCREENING FOR THYROID DISORDER: ICD-10-CM

## 2023-12-11 DIAGNOSIS — Z11.1 SCREENING FOR TUBERCULOSIS: ICD-10-CM

## 2023-12-11 DIAGNOSIS — Z13.1 SCREENING FOR DIABETES MELLITUS: ICD-10-CM

## 2023-12-11 DIAGNOSIS — D64.9 ANEMIA, UNSPECIFIED TYPE: ICD-10-CM

## 2023-12-11 LAB
ALBUMIN SERPL BCP-MCNC: 4.4 G/DL (ref 3.5–5)
ALP SERPL-CCNC: 67 U/L (ref 34–104)
ALT SERPL W P-5'-P-CCNC: 18 U/L (ref 7–52)
ANION GAP SERPL CALCULATED.3IONS-SCNC: 8 MMOL/L
AST SERPL W P-5'-P-CCNC: 22 U/L (ref 13–39)
BILIRUB SERPL-MCNC: 0.39 MG/DL (ref 0.2–1)
BUN SERPL-MCNC: 13 MG/DL (ref 5–25)
CALCIUM SERPL-MCNC: 8.5 MG/DL (ref 8.4–10.2)
CHLORIDE SERPL-SCNC: 108 MMOL/L (ref 96–108)
CHOLEST SERPL-MCNC: 188 MG/DL
CO2 SERPL-SCNC: 25 MMOL/L (ref 21–32)
CREAT SERPL-MCNC: 0.71 MG/DL (ref 0.6–1.3)
ERYTHROCYTE [DISTWIDTH] IN BLOOD BY AUTOMATED COUNT: 13.4 % (ref 11.6–15.1)
EST. AVERAGE GLUCOSE BLD GHB EST-MCNC: 111 MG/DL
GFR SERPL CREATININE-BSD FRML MDRD: 94 ML/MIN/1.73SQ M
GLUCOSE P FAST SERPL-MCNC: 87 MG/DL (ref 65–99)
HBA1C MFR BLD: 5.5 %
HCT VFR BLD AUTO: 43.2 % (ref 34.8–46.1)
HDLC SERPL-MCNC: 53 MG/DL
HGB BLD-MCNC: 13.8 G/DL (ref 11.5–15.4)
LDLC SERPL CALC-MCNC: 107 MG/DL (ref 0–100)
MCH RBC QN AUTO: 29.9 PG (ref 26.8–34.3)
MCHC RBC AUTO-ENTMCNC: 31.9 G/DL (ref 31.4–37.4)
MCV RBC AUTO: 94 FL (ref 82–98)
PLATELET # BLD AUTO: 315 THOUSANDS/UL (ref 149–390)
PMV BLD AUTO: 9.6 FL (ref 8.9–12.7)
POTASSIUM SERPL-SCNC: 4.5 MMOL/L (ref 3.5–5.3)
PROT SERPL-MCNC: 6.7 G/DL (ref 6.4–8.4)
RBC # BLD AUTO: 4.61 MILLION/UL (ref 3.81–5.12)
SODIUM SERPL-SCNC: 141 MMOL/L (ref 135–147)
TRIGL SERPL-MCNC: 141 MG/DL
TSH SERPL DL<=0.05 MIU/L-ACNC: 0.68 UIU/ML (ref 0.45–4.5)
WBC # BLD AUTO: 4.48 THOUSAND/UL (ref 4.31–10.16)

## 2023-12-11 PROCEDURE — 84443 ASSAY THYROID STIM HORMONE: CPT

## 2023-12-11 PROCEDURE — 85027 COMPLETE CBC AUTOMATED: CPT

## 2023-12-11 PROCEDURE — 80061 LIPID PANEL: CPT

## 2023-12-11 PROCEDURE — 36415 COLL VENOUS BLD VENIPUNCTURE: CPT

## 2023-12-11 PROCEDURE — 80053 COMPREHEN METABOLIC PANEL: CPT

## 2023-12-11 PROCEDURE — 86480 TB TEST CELL IMMUN MEASURE: CPT

## 2023-12-11 PROCEDURE — 83036 HEMOGLOBIN GLYCOSYLATED A1C: CPT

## 2023-12-12 LAB
GAMMA INTERFERON BACKGROUND BLD IA-ACNC: 0.02 IU/ML
M TB IFN-G BLD-IMP: NEGATIVE
M TB IFN-G CD4+ BCKGRND COR BLD-ACNC: 0 IU/ML
M TB IFN-G CD4+ BCKGRND COR BLD-ACNC: 0.01 IU/ML
MITOGEN IGNF BCKGRD COR BLD-ACNC: 9.98 IU/ML

## 2023-12-14 ENCOUNTER — TELEPHONE (OUTPATIENT)
Dept: FAMILY MEDICINE CLINIC | Facility: CLINIC | Age: 58
End: 2023-12-14

## 2023-12-14 ENCOUNTER — HOSPITAL ENCOUNTER (OUTPATIENT)
Dept: NON INVASIVE DIAGNOSTICS | Facility: HOSPITAL | Age: 58
Discharge: HOME | End: 2023-12-14
Payer: COMMERCIAL

## 2023-12-14 DIAGNOSIS — M79.89 LEG SWELLING: ICD-10-CM

## 2023-12-14 PROCEDURE — 93970 EXTREMITY STUDY: CPT | Performed by: SURGERY

## 2023-12-14 PROCEDURE — 93970 EXTREMITY STUDY: CPT

## 2023-12-14 NOTE — TELEPHONE ENCOUNTER
----- Message from Meghna Sims DO sent at 12/13/2023  9:03 PM EST -----  Please call patient, her recent QuantiFERON-TB test was negative. Please print these results and attach to her physical form that I have. She may pick these forms up once completed.   Thank you

## 2024-01-07 ENCOUNTER — VBI (OUTPATIENT)
Dept: ADMINISTRATIVE | Facility: OTHER | Age: 59
End: 2024-01-07

## 2024-01-07 NOTE — TELEPHONE ENCOUNTER
01/07/24 4:57 PM     [employer] SmartForm used to document caregap status.  Ce updated hm updated now  Anh Marcial

## 2024-01-25 ENCOUNTER — TELEPHONE (OUTPATIENT)
Dept: NEUROLOGY | Facility: CLINIC | Age: 59
End: 2024-01-25

## 2024-01-25 NOTE — TELEPHONE ENCOUNTER
Called patient and left voicemail to confirm their upcoming appointment with Dr. Raya. Informed patient about arriving in the [place] location 15 minutes prior to appointment to get checked in and go over chart.

## 2024-02-02 ENCOUNTER — OFFICE VISIT (OUTPATIENT)
Dept: NEUROLOGY | Facility: CLINIC | Age: 59
End: 2024-02-02
Payer: COMMERCIAL

## 2024-02-02 ENCOUNTER — TELEPHONE (OUTPATIENT)
Dept: OBGYN CLINIC | Facility: CLINIC | Age: 59
End: 2024-02-02

## 2024-02-02 VITALS
HEART RATE: 81 BPM | BODY MASS INDEX: 29.99 KG/M2 | TEMPERATURE: 96.5 F | DIASTOLIC BLOOD PRESSURE: 79 MMHG | HEIGHT: 65 IN | SYSTOLIC BLOOD PRESSURE: 131 MMHG | WEIGHT: 180 LBS

## 2024-02-02 DIAGNOSIS — G43.009 MIGRAINE WITHOUT AURA AND WITHOUT STATUS MIGRAINOSUS, NOT INTRACTABLE: Primary | ICD-10-CM

## 2024-02-02 DIAGNOSIS — G47.33 OSA (OBSTRUCTIVE SLEEP APNEA): ICD-10-CM

## 2024-02-02 PROCEDURE — 99215 OFFICE O/P EST HI 40 MIN: CPT | Performed by: PSYCHIATRY & NEUROLOGY

## 2024-02-02 RX ORDER — RIZATRIPTAN BENZOATE 10 MG/1
10 TABLET ORAL AS NEEDED
COMMUNITY
End: 2024-02-02 | Stop reason: CLARIF

## 2024-02-02 RX ORDER — KETOROLAC TROMETHAMINE 10 MG/1
10 TABLET, FILM COATED ORAL EVERY 6 HOURS PRN
COMMUNITY
End: 2024-02-02

## 2024-02-02 RX ORDER — LANOLIN ALCOHOL/MO/W.PET/CERES
CREAM (GRAM) TOPICAL
Qty: 90 TABLET | Refills: 4 | Status: SHIPPED | OUTPATIENT
Start: 2024-02-02

## 2024-02-02 RX ORDER — RIMEGEPANT SULFATE 75 MG/75MG
TABLET, ORALLY DISINTEGRATING ORAL
Qty: 16 TABLET | Refills: 11 | Status: SHIPPED | OUTPATIENT
Start: 2024-02-02

## 2024-02-02 RX ORDER — KETOROLAC TROMETHAMINE 10 MG/1
10 TABLET, FILM COATED ORAL DAILY PRN
Qty: 10 TABLET | Refills: 6 | Status: SHIPPED | OUTPATIENT
Start: 2024-02-02

## 2024-02-02 NOTE — LETTER
February 2, 2024     Patient: Elizabeth Reed  YOB: 1965  Date of Visit: 2/2/2024      To Whom it May Concern:    Elizabeth Reed is under my professional care. Elizabeth was seen in my office on 2/2/2024.     If you have any questions or concerns, please don't hesitate to call.         Sincerely,          Loni Raya MD        CC: No Recipients

## 2024-02-02 NOTE — PROGRESS NOTES
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 unremarkable      CTA head and neck 03/09/2018:  1.  No hemodynamically significant stenosis in the major arteries of the neck.  2.  No intracranial aneurysm or major intracranial arterial stenosis.  3.  No acute intracranial hemorrhage.  4..  7 mm left upper lobe pulmonary nodule. Based on current Fleischner Society 2017 Guidelines on incidental pulmonary nodule, followup non-contrast CT is recommended at 6-12 months from the initial examination and, if stable at that time, an additional   followup is recommended for 18-24 months from the initial examination.     MRI brain 07/2014:  Normal  Review of Systems:   ROS obtained by medical assistant and personally reviewed, but if any symptoms listed below say negative, does not mean patient has not had this symptom since last visit, please see HPI for details of symptoms discussed this visit. I recommended PCP follow up for non neurologic problems.    Review of Systems   Constitutional:  Negative for appetite change and fever.   HENT: Negative.  Negative for hearing loss, tinnitus, trouble swallowing and voice change.    Eyes: Negative.  Negative for photophobia and  pain.   Respiratory: Negative.  Negative for shortness of breath.    Cardiovascular: Negative.  Negative for palpitations.   Gastrointestinal: Negative.  Negative for nausea and vomiting.   Endocrine: Negative.  Negative for cold intolerance.   Genitourinary: Negative.  Negative for dysuria, frequency and urgency.   Musculoskeletal: Negative.  Negative for myalgias and neck pain.   Skin: Negative.  Negative for rash.   Neurological:  Positive for headaches. Negative for dizziness, tremors, seizures, syncope, facial asymmetry, speech difficulty, weakness, light-headedness and numbness.   Hematological: Negative.  Does not bruise/bleed easily.   Psychiatric/Behavioral: Negative.  Negative for confusion, hallucinations and sleep disturbance.    All other systems reviewed and are negative.       I have spent 64 minutes with Patient  today in which greater than 50% of this time was spent in counseling/coordination of care regarding Diagnostic results, Prognosis, Risks and benefits of tx options, Instructions for management, Patient and family education, Importance of tx compliance, Risk factor reductions, Impressions, Counseling / Coordination of care, Documenting in the medical record, Reviewing / ordering tests, medicine, procedures  , Obtaining or reviewing history  , and Communicating with other healthcare professionals . I also spent 3  minutes non face to face for this patient the same day.       Author:  Loni Raya MD 2/2/2024 7:06 PM

## 2024-02-02 NOTE — PATIENT INSTRUCTIONS
"\"Why we sleep\" - consider this book       -If you feel like you could sleep on your back that night only, certainly could try to sleep on your back for the sleep study, but not if you feel like you cannot sleep this way.  Just getting sleep is the most important thing, as the machine thinks you are sleeping the entire time it is plug in. Otherwise we discussed home sleep study can underestimate the problem and try not to plug in the machine until you are just about to fall asleep.  If it comes back that you almost have sleep apnea or other sleep disorder, but not meeting criteria, we could consider in lab study and reach out to me if you would like this ordered before next visit.    I am placing a referral for a sleep study and if it is abnormal we will place one to the sleep medicine specialist team to further evaluate your sleep issues.  Please call [phone] to schedule the sleep study and afterwards if needed I recommend you see one of the following providers:  Yoel Reed DO   Wesley Graham PA-C        Headache/migraine treatment:   Rescue medications (for immediate treatment of a headache):   It is ok to take ibuprofen, acetaminophen or naproxen (Advil, Tylenol,  Aleve, Excedrin) if they help your headaches you should limit these to No more than 3 times a week to avoid medication overuse/rebound headaches.     For your more moderate to severe migraines take this medication early   -     magnesium Oxide (MAG-OX) 400 mg TABS; Can take 1 tab po daily or if not wanting daily pill, at least take 1 tab PO prn migraine  -     ketorolac (TORADOL) 10 mg tablet; Take 1 tablet (10 mg total) by mouth daily as needed for severe pain Max 1/day, 3/week, 10/month. Do not use with other OTC pain meds.      -     rimegepant sulfate (Nurtec) 75 mg TBDP; Take one NURTEC 75 mg at onset under tongue. Limit 1 in 24 " hours.      Over the counter preventive supplements for headaches/migraines (if you try, try for 3 months straight)  (to take every day to help prevent headaches - not to take at the time of headache):  There are combo pills online of these - none of which regulated by FDA and double check dosing - take appropriate dose only once a day- preventa migraine, migravent, mind ease, migrelief   [] Magnesium 400mg daily (If any diarrhea or upset stomach, decrease dose  as tolerated)  [] Riboflavin (Vitamin B2) 400mg daily - try online   (FYI B2 may make your urine bright/neon yellow)  AND/OR  [] Herbal medication: Petasites/Butterbur 150 mg daily - try online  (When choosing your Butterbur online or in the store, beware that there are some poor preps containing pyrrolizidine alkaloids (PAs) that can be harmful to the liver. Therefore, do not use butterbur products that are not labeled as PA-free.)      Prescription preventive medications for headaches/migraines   (to take every day to help prevent headaches - not to take at the time of headache):  [x] We have options if needed        *Typically these types of medications take time until you see the benefit, although some may see improvement in days, often it may take weeks, especially if the medication is being titrated up to a beneficial level. Please contact us if there are any concerns or questions regarding the medication.     Lifestyle Recommendations:  [x] SLEEP - Maintain a regular sleep schedule: Adults need at least 7-8 hours of uninterrupted a night. Maintain good sleep hygiene:  Going to bed and waking up at consistent times, avoiding excessive daytime naps, avoiding caffeinated beverages in the evening, avoid excessive stimulation in the evening and generally using bed primarily for sleeping.  One hour before bedtime would recommend turning lights down lower, decreasing your activity (may read quietly, listen to music at a low volume). When you get into bed,  should eliminate all technology (no texting, emailing, playing with your phone, iPad or tablet in bed).  [x] HYDRATION - Maintain good hydration.  Drink  2L of fluid a day (4 typical small water bottles)  [x] DIET - Maintain good nutrition. In particular don't skip meals and try and eat healthy balanced meals regularly.  [x] TRIGGERS - Look for other triggers and avoid them: Limit caffeine to 1-2 cups a day or less. Avoid dietary triggers that you have noticed bring on your headaches (this could include aged cheese, peanuts, MSG, aspartame and nitrates).  [x] EXERCISE - physical exercise as we all know is good for you in many ways, and not only is good for your heart, but also is beneficial for your mental health, cognitive health and  chronic pain/headaches. I would encourage at the least 5 days of physical exercise weekly for at least 30 minutes.     Education and Follow-up  [x] Please call with any questions or concerns. Of course if any new concerning symptoms go to the emergency department.  [x] Follow up 3 months, sooner if needed

## 2024-02-02 NOTE — TELEPHONE ENCOUNTER
Called patient to confirm appointment for 2/5, Pt did not answer, left vm with office call back number.

## 2024-02-03 PROBLEM — G44.309 POST-CONCUSSION HEADACHE: Status: RESOLVED | Noted: 2018-09-12 | Resolved: 2024-02-03

## 2024-02-05 ENCOUNTER — ANNUAL EXAM (OUTPATIENT)
Dept: OBGYN CLINIC | Facility: CLINIC | Age: 59
End: 2024-02-05

## 2024-02-05 VITALS
WEIGHT: 176 LBS | BODY MASS INDEX: 29.32 KG/M2 | HEART RATE: 86 BPM | SYSTOLIC BLOOD PRESSURE: 140 MMHG | DIASTOLIC BLOOD PRESSURE: 99 MMHG | HEIGHT: 65 IN

## 2024-02-05 DIAGNOSIS — Z12.31 ENCOUNTER FOR SCREENING MAMMOGRAM FOR MALIGNANT NEOPLASM OF BREAST: ICD-10-CM

## 2024-02-05 DIAGNOSIS — Z12.4 CERVICAL CANCER SCREENING: ICD-10-CM

## 2024-02-05 DIAGNOSIS — Z01.419 WOMEN'S ANNUAL ROUTINE GYNECOLOGICAL EXAMINATION: Primary | ICD-10-CM

## 2024-02-05 DIAGNOSIS — Z11.3 SCREEN FOR STD (SEXUALLY TRANSMITTED DISEASE): ICD-10-CM

## 2024-02-05 PROCEDURE — 88175 CYTOPATH C/V AUTO FLUID REDO: CPT | Performed by: NURSE PRACTITIONER

## 2024-02-05 PROCEDURE — 87624 HPV HI-RISK TYP POOLED RSLT: CPT | Performed by: NURSE PRACTITIONER

## 2024-02-05 PROCEDURE — S0612 ANNUAL GYNECOLOGICAL EXAMINA: HCPCS | Performed by: NURSE PRACTITIONER

## 2024-02-05 NOTE — LETTER
February 5, 2024     Patient: Elizabeth Reed  YOB: 1965  Date of Visit: 2/5/2024      To Whom it May Concern:    Elizabeth Reed is under my professional care. Elizabeth was seen in my office on 2/5/2024. Elizabeth may return to work on 2/6/2024 without restriction .    If you have any questions or concerns, please don't hesitate to call.         Sincerely,          CAYDEN Snyder        CC: No Recipients

## 2024-02-05 NOTE — PROGRESS NOTES
ANNUAL GYNECOLOGICAL EXAMINATION    Elizabeth Reed is a 58 y.o. female who presents today for annual GYN exam.  Her last pap smear was performed 10/2022, pap results were NILM, HR-HPV negative. A cervical biopsy done due to large nabothian cyst mild atypia, favor reactive atypia though can not exclude low-grade squamous intraepithelial lesion (LSIL/RUTHY 1).  She reports a history of abnormal pap smears in her past, around age 25.  Her last mammogram was performed 2023 and result was BI-RADS 1.  She had colonoscopy performed in  with recommendation to repeat in 5 years.  She had HIV screening performed in  and it was negative.  She reports menses as absent for over one year.  Patient's last menstrual period was 2019.  Her general medical history has been reviewed and she reports it as follows:    Past Medical History:   Diagnosis Date    Colon polyp     Head injury     Knee injury     right knee    Migraine      Past Surgical History:   Procedure Laterality Date    COLONOSCOPY      LAPAROSCOPY      Exploratory    SC ESOPHAGOGASTRODUODENOSCOPY TRANSORAL DIAGNOSTIC N/A 2018    Procedure: ESOPHAGOGASTRODUODENOSCOPY (EGD);  Surgeon: Zulma Rodriguez MD;  Location: [facility];  Service: Gastroenterology     OB History          18    Para   10    Term   10            AB   8    Living   10         SAB   7    IAB        Ectopic   1    Multiple        Live Births   10               Social History     Tobacco Use    Smoking status: Former     Current packs/day: 0.00     Types: Cigarettes     Quit date:      Years since quittin.1    Smokeless tobacco: Never    Tobacco comments:     Quit   Vaping Use    Vaping status: Never Used   Substance Use Topics    Alcohol use: Yes     Comment: social    Drug use: No     Social History     Substance and Sexual Activity   Sexual Activity Not Currently    Birth control/protection: Post-menopausal     Cancer-related family history is  "negative for Colon cancer.    Current Outpatient Medications   Medication Instructions    ketorolac (TORADOL) 10 mg, Oral, Daily PRN, Max 1/day, 3/week, 10/month. Do not use with other OTC pain meds.    magnesium Oxide (MAG-OX) 400 mg TABS Can take 1 tab po daily or if not wanting daily pill, at least take 1 tab PO prn migraine    Probiotic Product (TruBiotics) CAPS 1 capsule, Oral, Daily    rimegepant sulfate (Nurtec) 75 mg TBDP Take one NURTEC 75 mg at onset under tongue. Limit 1 in 24 hours.       Review of Systems:  Review of Systems   Constitutional: Negative.    Gastrointestinal: Negative.    Genitourinary: Negative.    Skin: Negative.        Physical Exam:  /99 (BP Location: Left arm, Patient Position: Sitting)   Pulse 86   Ht 5' 5\" (1.651 m)   Wt 79.8 kg (176 lb)   LMP 04/30/2019   BMI 29.29 kg/m²   Physical Exam  Constitutional:       General: She is not in acute distress.     Appearance: Normal appearance.   Genitourinary:      Vulva and bladder normal.      No lesions in the vagina.      No vaginal erythema or ulceration.        Right Adnexa: not tender and no mass present.     Left Adnexa: not tender and no mass present.     Cervical nabothian cyst present.      No cervical motion tenderness or lesion.      Cervical exam comments: Nabothian cyst present at 6 o'clock .      Uterus is not enlarged or tender.      No uterine mass detected.  Breasts:     Right: No mass, nipple discharge or skin change.      Left: No mass, nipple discharge or skin change.   Cardiovascular:      Rate and Rhythm: Normal rate and regular rhythm.   Pulmonary:      Effort: Pulmonary effort is normal.      Breath sounds: Normal breath sounds.   Abdominal:      General: Abdomen is flat.      Palpations: Abdomen is soft.   Musculoskeletal:      Cervical back: Neck supple.   Neurological:      Mental Status: She is alert.   Skin:     General: Skin is warm and dry.   Psychiatric:         Mood and Affect: Mood normal.         " Behavior: Behavior normal.   Vitals reviewed.         Assessment/Plan:   1. Normal well-woman GYN exam.  2. Cervical cancer screening:  Previously documented nabothian cyst again noted on cervical exam.  Pap smear done with HPV co-testing due to RUTHY cervical biopsy at last visit.    3. STD screening: Orders placed for vaginal GC/CT cultures.  Orders placed for serum anti-HIV, anti-HCV, HbsAg, syphilis panel.   4. Breast cancer screening:  Normal breast exam.  Order placed for bilateral screening mammogram.  Reviewed breast self-awareness.   5. Colon cancer screening:  Up to date, due for repeat colonoscopy in 2025.    6. Depression Screening: Patient's depression screening was assessed with a PHQ-2 score of 0. Their PHQ-9 score was 0. Clinically patient does not have depression. No treatment is required.     7. BMI Counseling: Body mass index is 29.29 kg/m². Discussed the patient's BMI with her. The BMI is under 30 and does not require intervention.    8. Return to office in one year for annual exam or sooner if needed.    Reviewed with patient that [facility], but that they will not necessarily be reviewed by me immediately.  Explained that I will review results at my earliest opportunity and contact patient appropriately.

## 2024-02-07 DIAGNOSIS — G47.33 OSA (OBSTRUCTIVE SLEEP APNEA): Primary | ICD-10-CM

## 2024-02-09 ENCOUNTER — TELEPHONE (OUTPATIENT)
Dept: OBGYN CLINIC | Facility: CLINIC | Age: 59
End: 2024-02-09

## 2024-02-09 LAB
LAB AP GYN PRIMARY INTERPRETATION: NORMAL
Lab: NORMAL

## 2024-02-09 NOTE — TELEPHONE ENCOUNTER
----- Message from CAYDEN Amin sent at 2/9/2024  2:14 PM EST -----  Please let her know the pap smear is normal. Thank you!

## 2024-08-26 NOTE — TELEPHONE ENCOUNTER
Per Stuart Post, Dr Mcdonald's nurse, called and spoke with patient to schedule follow up appointment with [facility]  Scheduled appt for 11/8/22 at 2pm at [facility]  Reviewed that it was to review her MRI results  She was agreeable  - Not on anti-platelets, on Eliquis  - c/w statin therapy  - PT eval: ADILIA - Not on anti-platelets, on Eliquis  - c/w statin therapy  - PT eval: ADILIA - c/w eliquis  - Monitor on Telemetry Not on anti-platelets, on Eliquis  - c/w statin therapy  - PT eval: ADILIA

## 2024-09-26 ENCOUNTER — NEW PATIENT COMPREHENSIVE (OUTPATIENT)
Dept: URBAN - METROPOLITAN AREA CLINIC 6 | Facility: CLINIC | Age: 59
End: 2024-09-26

## 2024-09-26 DIAGNOSIS — H25.813: ICD-10-CM

## 2024-09-26 DIAGNOSIS — H35.341: ICD-10-CM

## 2024-09-26 PROCEDURE — 92134 CPTRZ OPH DX IMG PST SGM RTA: CPT

## 2024-09-26 PROCEDURE — 92004 COMPRE OPH EXAM NEW PT 1/>: CPT

## 2024-09-26 ASSESSMENT — VISUAL ACUITY
OS_CC: 20/25
OU_CC: J1
OU_CC: 20/25-1
OD_CC: 20/30-2

## 2024-09-26 ASSESSMENT — TONOMETRY
OS_IOP_MMHG: 16
OD_IOP_MMHG: 13
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
## 2024-11-14 ENCOUNTER — APPOINTMENT (OUTPATIENT)
Dept: LAB | Age: 59
End: 2024-11-14
Payer: COMMERCIAL

## 2024-11-14 DIAGNOSIS — K86.2 PANCREAS CYST: ICD-10-CM

## 2024-11-14 DIAGNOSIS — Z11.3 SCREEN FOR STD (SEXUALLY TRANSMITTED DISEASE): ICD-10-CM

## 2024-11-14 LAB
BUN SERPL-MCNC: 16 MG/DL (ref 5–25)
CREAT SERPL-MCNC: 0.64 MG/DL (ref 0.6–1.3)
GFR SERPL CREATININE-BSD FRML MDRD: 97 ML/MIN/1.73SQ M
TREPONEMA PALLIDUM IGG+IGM AB [PRESENCE] IN SERUM OR PLASMA BY IMMUNOASSAY: NORMAL

## 2024-11-14 PROCEDURE — 87521 HEPATITIS C PROBE&RVRS TRNSC: CPT

## 2024-11-14 PROCEDURE — 82565 ASSAY OF CREATININE: CPT

## 2024-11-14 PROCEDURE — 84520 ASSAY OF UREA NITROGEN: CPT

## 2024-11-14 PROCEDURE — 86780 TREPONEMA PALLIDUM: CPT

## 2024-11-14 PROCEDURE — 87522 HEPATITIS C REVRS TRNSCRPJ: CPT

## 2024-11-14 PROCEDURE — 87340 HEPATITIS B SURFACE AG IA: CPT

## 2024-11-14 PROCEDURE — 87389 HIV-1 AG W/HIV-1&-2 AB AG IA: CPT

## 2024-11-14 PROCEDURE — 36415 COLL VENOUS BLD VENIPUNCTURE: CPT

## 2024-11-15 ENCOUNTER — RESULTS FOLLOW-UP (OUTPATIENT)
Dept: OBGYN CLINIC | Facility: CLINIC | Age: 59
End: 2024-11-15

## 2024-11-15 LAB
HBV SURFACE AG SER QL: NORMAL
HCV RNA SERPL NAA+PROBE-ACNC: NOT DETECTED K[IU]/ML
HIV 1+2 AB+HIV1 P24 AG SERPL QL IA: NORMAL
HIV 2 AB SERPL QL IA: NORMAL
HIV1 AB SERPL QL IA: NORMAL
HIV1 P24 AG SERPL QL IA: NORMAL

## 2024-11-15 NOTE — TELEPHONE ENCOUNTER
Left a vm for the pt to call the office c/b# was provided for her neg sti  blood work results.    ----- Message from CAYDEN Amin sent at 11/15/2024 12:06 PM EST -----  Please let her know the sti blood work is negative. Thank you!

## 2024-12-02 ENCOUNTER — TELEPHONE (OUTPATIENT)
Dept: SURGICAL ONCOLOGY | Facility: CLINIC | Age: 59
End: 2024-12-02

## 2024-12-02 NOTE — TELEPHONE ENCOUNTER
Called patient and left message that we would like to get her rescheduled for the MRI and then also get her rescheduled for her office visit with Dr. Mcdonald following the MRI. About 1-2 weeks  after. I stated that I will go ahead and cancel her appointment for tomorrow with Dr. Mcdonald and that I will keep an eye out on the rescheduled MRI and I will be in touch then for the office visit. I left patient both Central Scheduling's phone number as well as the [facility]'s phone number should she have any questions or concerns.

## 2024-12-06 ENCOUNTER — TELEPHONE (OUTPATIENT)
Age: 59
End: 2024-12-06

## 2024-12-06 ENCOUNTER — TELEPHONE (OUTPATIENT)
Dept: SURGICAL ONCOLOGY | Facility: CLINIC | Age: 59
End: 2024-12-06

## 2024-12-06 DIAGNOSIS — K86.2 PANCREAS CYST: Primary | ICD-10-CM

## 2024-12-06 NOTE — TELEPHONE ENCOUNTER
Patient calling to request Dr. Mcdonald's office reschedule her MRI. When she called central scheduling they wouldn't assist her.  Please call her at [phone]. If no answer she would like his office to leave direct contact number.  She also states she is available Tuesday evenings @ 7pm to have mri done

## 2024-12-06 NOTE — TELEPHONE ENCOUNTER
Called patient and left message that I was calling to schedule the MRI but I needed to ask some screening questions first. I did state that the reason she could not schedule the MRI was because the order had . I stated that a new order for the MRI and blood work was put in and that she can go ahead and call [facility] Sched[facility] again if she wishes to get the Mri rescheduled. I also let her know that she will need to get the blood work done 2-3 days before the MRI so that they can administer the contrast. I will keep an eye on the appointments to see that they were made.

## 2024-12-30 ENCOUNTER — HOSPITAL ENCOUNTER (OUTPATIENT)
Dept: RADIOLOGY | Facility: HOSPITAL | Age: 59
Discharge: HOME | End: 2024-12-30
Payer: COMMERCIAL

## 2024-12-30 DIAGNOSIS — K86.2 PANCREAS CYST: ICD-10-CM

## 2024-12-30 PROCEDURE — A9585 GADOBUTROL INJECTION: HCPCS

## 2024-12-30 PROCEDURE — 74183 MRI ABD W/O CNTR FLWD CNTR: CPT

## 2024-12-30 RX ORDER — GADOBUTROL 604.72 MG/ML
7 INJECTION INTRAVENOUS
Status: COMPLETED | OUTPATIENT
Start: 2024-12-30 | End: 2024-12-30

## 2024-12-30 RX ADMIN — GADOBUTROL 7 ML: 604.72 INJECTION INTRAVENOUS at 05:24

## 2025-01-03 ENCOUNTER — CATARACT PRE-OP EXAM (OUTPATIENT)
Dept: URBAN - METROPOLITAN AREA CLINIC 6 | Facility: CLINIC | Age: 60
End: 2025-01-03

## 2025-01-03 DIAGNOSIS — H35.341: ICD-10-CM

## 2025-01-03 DIAGNOSIS — H25.813: ICD-10-CM

## 2025-01-03 PROCEDURE — 92014 COMPRE OPH EXAM EST PT 1/>: CPT

## 2025-01-03 PROCEDURE — 92136 OPHTHALMIC BIOMETRY: CPT

## 2025-01-03 ASSESSMENT — VISUAL ACUITY
OS_GLARE: 20/100
OU_CC: J3
OS_PH: 20/30-1
OS_CC: 20/50
OD_GLARE: 20/70
OD_PH: 20/30-2
OD_CC: 20/50

## 2025-01-03 ASSESSMENT — KERATOMETRY
OS_K2POWER_DIOPTERS: 44.75
OD_AXISANGLE2_DEGREES: 30
OD_AXISANGLE_DEGREES: 120
OS_K1POWER_DIOPTERS: 43.75
OS_AXISANGLE2_DEGREES: 160
OD_K2POWER_DIOPTERS: 44.50
OS_AXISANGLE_DEGREES: 70
OD_K1POWER_DIOPTERS: 44.00

## 2025-01-03 ASSESSMENT — TONOMETRY
OS_IOP_MMHG: 12
OD_IOP_MMHG: 12

## 2025-01-07 ENCOUNTER — OFFICE VISIT (OUTPATIENT)
Dept: SURGICAL ONCOLOGY | Facility: CLINIC | Age: 60
End: 2025-01-07
Payer: COMMERCIAL

## 2025-01-07 VITALS
HEIGHT: 65 IN | OXYGEN SATURATION: 97 % | TEMPERATURE: 98 F | WEIGHT: 180 LBS | DIASTOLIC BLOOD PRESSURE: 92 MMHG | BODY MASS INDEX: 29.99 KG/M2 | SYSTOLIC BLOOD PRESSURE: 150 MMHG | HEART RATE: 76 BPM

## 2025-01-07 DIAGNOSIS — R10.2 PELVIC PAIN: ICD-10-CM

## 2025-01-07 DIAGNOSIS — K86.2 PANCREAS CYST: Primary | ICD-10-CM

## 2025-01-07 PROCEDURE — 99214 OFFICE O/P EST MOD 30 MIN: CPT

## 2025-01-07 NOTE — ASSESSMENT & PLAN NOTE
Patient is experiencing new onset pelvic/groin pain.  I do not feel this is in any way related to her pancreatic cyst.  I have recommend she be evaluated by her gynecologist and potentially consider colonoscopy.

## 2025-01-07 NOTE — ASSESSMENT & PLAN NOTE
Pancreatic cyst remains stable at 1.3cm, without any new or concerning features.  The patient would like to continue regular surveillance.  I have reviewed updated guidelines, which recommend imaging every 18 months up to 5 years.  Since her first MRI was in February 2021, we will plan to repeat MRI/MRCP in 18 months, and then likely discontinue surveillance as long as there are no changes.

## 2025-01-07 NOTE — LETTER
January 7, 2025     Patient: Elizabeth Reed  YOB: 1965  Date of Visit: 1/7/2025      To Whom it May Concern:    Elizabeth Reed is under my professional care. Elizabeth was seen in my office on 1/7/2025. Please excuse her absence for this medically necessary appointment.    If you have any questions or concerns, please don't hesitate to call.         Sincerely,          CAYDEN Allen        CC: No Recipients

## 2025-01-07 NOTE — PROGRESS NOTES
Name: Elizabeth Reed      : 1965      MRN: [id number]  Encounter Provider: CAYDEN Allen  Encounter Date: 2025   Encounter [facility]  :  Assessment & Plan  Pancreas cyst  Pancreatic cyst remains stable at 1.3cm, without any new or concerning features.  The patient would like to continue regular surveillance.  I have reviewed updated guidelines, which recommend imaging every 18 months up to 5 years.  Since her first MRI was in 2021, we will plan to repeat MRI/MRCP in 18 months, and then likely discontinue surveillance as long as there are no changes.         Pelvic pain  Patient is experiencing new onset pelvic/groin pain.  I do not feel this is in any way related to her pancreatic cyst.  I have recommend she be evaluated by her gynecologist and potentially consider colonoscopy.           History of Present Illness   Chief Complaint   Patient presents with   • Follow-up     Return in about 18 months (around 2026).    Pertinent Medical History   This is a 60 y/o female who returns to the office today for pancreatic cyst surveillance.  She denies any upper abdominal pain, weight loss, diarrhea or vomiting.  She does complain of new lower pelvic and groin pain, and experiences chronic reflux.   She is scheduled to see her gynecologist next month, and she will be due for colonoscopy later this year.  MRI with MRCP was performed on , and this revealed overall stability of the bilobed cyst in the uncinate process, currently measuring 1.3 x 0.8 x 1.2cm.  There were no suspicious features seen.         Review of Systems A complete review of systems is negative other than that noted above in the HPI.       Current Outpatient Medications   Medication Sig Dispense Refill   • ketorolac (TORADOL) 10 mg tablet Take 1 tablet (10 mg total) by mouth daily as needed for severe pain Max 1/day, 3/week, 10/month. Do not use  "with other OTC pain meds. (Patient not taking: Reported on 1/7/2025) 10 tablet 6   • magnesium Oxide (MAG-OX) 400 mg TABS Can take 1 tab po daily or if not wanting daily pill, at least take 1 tab PO prn migraine (Patient not taking: Reported on 1/7/2025) 90 tablet 4   • Probiotic Product (TruBiotics) CAPS Take 1 capsule by mouth daily (Patient not taking: Reported on 1/7/2025) 90 capsule 1   • rimegepant sulfate (Nurtec) 75 mg TBDP Take one NURTEC 75 mg at onset under tongue. Limit 1 in 24 hours. (Patient not taking: Reported on 1/7/2025) 16 tablet 11     No current facility-administered medications for this visit.      Objective   /92   Pulse 76   Temp 98 °F (36.7 °C)   Ht 5' 5\" (1.651 m)   Wt 81.6 kg (180 lb)   LMP 04/30/2019   SpO2 97%   BMI 29.95 kg/m²     Pain Screening:  Pain Score: 0-No pain  ECOG    Physical Exam  Vitals reviewed.   Constitutional:       General: She is not in acute distress.     Appearance: Normal appearance. She is normal weight. She is not ill-appearing or toxic-appearing.   HENT:      Head: Normocephalic and atraumatic.   Eyes:      General: No scleral icterus.  Cardiovascular:      Rate and Rhythm: Normal rate.   Pulmonary:      Effort: Pulmonary effort is normal.   Abdominal:      Palpations: Abdomen is soft.   Musculoskeletal:         General: Normal range of motion.      Cervical back: Normal range of motion and neck supple.   Skin:     General: Skin is warm and dry.      Coloration: Skin is not jaundiced.   Neurological:      General: No focal deficit present.      Mental Status: She is alert and oriented to person, place, and time.   Psychiatric:         Mood and Affect: Mood normal.         Behavior: Behavior normal.         Thought Content: Thought content normal.         Judgment: Judgment normal.              Radiology Results Review: I have reviewed radiology reports from 12/30/2024 including: MRI abdomen/MRCP.    "

## 2025-01-07 NOTE — LETTER
2025     Martin Mcdonald MD  [address] [facility]  2nd Floor  [facility] [address]    Patient: Elizabeth Reed   YOB: 1965   Date of Visit: 2025       Dear Dr. Mcdonald:    Thank you for referring Elizabeth Reed to me for evaluation. Below are my notes for this consultation.    If you have questions, please do not hesitate to call me. I look forward to following your patient along with you.         Sincerely,        CAYDEN Allen        CC: CAYDEN Amin MD Fawn Noel Wolfe, CRNP  2025  2:59 PM  Sign when Signing Visit  Name: Elizabeth Reed      : 1965      MRN: [id number]  Encounter Provider: CAYDEN Allen  Encounter Date: 2025   Encounter department: [facility]  :  Assessment & Plan  Pancreas cyst  Pancreatic cyst remains stable at 1.3cm, without any new or concerning features.  The patient would like to continue regular surveillance.  I have reviewed updated guidelines, which recommend imaging every 18 months up to 5 years.  Since her first MRI was in 2021, we will plan to repeat MRI/MRCP in 18 months, and then likely discontinue surveillance as long as there are no changes.  I do not feel that her pelvic pain is in any way related to her pancreatic cyst.  I have recommend she be evaluated by her gynecologist and potentially consider colonoscopy.           History of Present Illness  Chief Complaint   Patient presents with   • Follow-up     Return in about 18 months (around 2026).    Pertinent Medical History  This is a 60 y/o female who returns to the office today for pancreatic cyst surveillance.  She denies any upper abdominal pain, weight loss, diarrhea or vomiting.  She does complain of new lower pelvic and groin pain, and experiences chronic reflux.   She is scheduled to see her gynecologist next month, and she will be due for colonoscopy later this year.   "MRI with MRCP was performed on December 30, and this revealed overall stability of the bilobed cyst in the uncinate process, currently measuring 1.3 x 0.8 x 1.2cm.  There were no suspicious features seen.         Review of Systems A complete review of systems is negative other than that noted above in the HPI.       Current Outpatient Medications   Medication Sig Dispense Refill   • ketorolac (TORADOL) 10 mg tablet Take 1 tablet (10 mg total) by mouth daily as needed for severe pain Max 1/day, 3/week, 10/month. Do not use with other OTC pain meds. (Patient not taking: Reported on 1/7/2025) 10 tablet 6   • magnesium Oxide (MAG-OX) 400 mg TABS Can take 1 tab po daily or if not wanting daily pill, at least take 1 tab PO prn migraine (Patient not taking: Reported on 1/7/2025) 90 tablet 4   • Probiotic Product (TruBiotics) CAPS Take 1 capsule by mouth daily (Patient not taking: Reported on 1/7/2025) 90 capsule 1   • rimegepant sulfate (Nurtec) 75 mg TBDP Take one NURTEC 75 mg at onset under tongue. Limit 1 in 24 hours. (Patient not taking: Reported on 1/7/2025) 16 tablet 11     No current facility-administered medications for this visit.      Objective  /92   Pulse 76   Temp 98 °F (36.7 °C)   Ht 5' 5\" (1.651 m)   Wt 81.6 kg (180 lb)   LMP 04/30/2019   SpO2 97%   BMI 29.95 kg/m²     Pain Screening:  Pain Score: 0-No pain  ECOG    Physical Exam  Vitals reviewed.   Constitutional:       General: She is not in acute distress.     Appearance: Normal appearance. She is normal weight. She is not ill-appearing or toxic-appearing.   HENT:      Head: Normocephalic and atraumatic.   Eyes:      General: No scleral icterus.  Cardiovascular:      Rate and Rhythm: Normal rate.   Pulmonary:      Effort: Pulmonary effort is normal.   Abdominal:      Palpations: Abdomen is soft.   Musculoskeletal:         General: Normal range of motion.      Cervical back: Normal range of motion and neck supple.   Skin:     General: Skin is " warm and dry.      Coloration: Skin is not jaundiced.   Neurological:      General: No focal deficit present.      Mental Status: She is alert and oriented to person, place, and time.   Psychiatric:         Mood and Affect: Mood normal.         Behavior: Behavior normal.         Thought Content: Thought content normal.         Judgment: Judgment normal.              Radiology Results Review: I have reviewed radiology reports from 12/30/2024 including: MRI abdomen/MRCP.

## 2025-01-14 ENCOUNTER — APPOINTMENT (OUTPATIENT)
Dept: RADIOLOGY | Facility: CLINIC | Age: 60
End: 2025-01-14
Payer: COMMERCIAL

## 2025-01-14 ENCOUNTER — OFFICE VISIT (OUTPATIENT)
Dept: FAMILY MEDICINE CLINIC | Facility: CLINIC | Age: 60
End: 2025-01-14
Payer: COMMERCIAL

## 2025-01-14 VITALS
HEART RATE: 82 BPM | WEIGHT: 179.6 LBS | HEIGHT: 65 IN | SYSTOLIC BLOOD PRESSURE: 136 MMHG | OXYGEN SATURATION: 98 % | DIASTOLIC BLOOD PRESSURE: 80 MMHG | BODY MASS INDEX: 29.92 KG/M2 | TEMPERATURE: 98.4 F

## 2025-01-14 DIAGNOSIS — Z13.1 SCREENING FOR DIABETES MELLITUS: ICD-10-CM

## 2025-01-14 DIAGNOSIS — Z13.29 SCREENING FOR THYROID DISORDER: ICD-10-CM

## 2025-01-14 DIAGNOSIS — R06.83 SNORING: ICD-10-CM

## 2025-01-14 DIAGNOSIS — R07.81 RIB PAIN ON RIGHT SIDE: ICD-10-CM
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
## 2025-01-14 DIAGNOSIS — S05.11XA CONTUSION OF GLOBE OF RIGHT EYE, INITIAL ENCOUNTER: ICD-10-CM

## 2025-01-14 DIAGNOSIS — K21.9 GASTROESOPHAGEAL REFLUX DISEASE WITHOUT ESOPHAGITIS: ICD-10-CM

## 2025-01-14 DIAGNOSIS — R40.0 DAYTIME SOMNOLENCE: ICD-10-CM

## 2025-01-14 DIAGNOSIS — Z13.0 SCREENING FOR IRON DEFICIENCY ANEMIA: ICD-10-CM
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
## 2025-01-14 DIAGNOSIS — K92.1 MELENA: ICD-10-CM

## 2025-01-14 DIAGNOSIS — Z00.00 ANNUAL PHYSICAL EXAM: Primary | ICD-10-CM

## 2025-01-14 DIAGNOSIS — Z13.220 SCREENING FOR CHOLESTEROL LEVEL: ICD-10-CM

## 2025-01-14 DIAGNOSIS — Z12.11 SCREENING FOR COLON CANCER: ICD-10-CM

## 2025-01-14 PROCEDURE — 99214 OFFICE O/P EST MOD 30 MIN: CPT | Performed by: FAMILY MEDICINE

## 2025-01-14 PROCEDURE — 99396 PREV VISIT EST AGE 40-64: CPT | Performed by: FAMILY MEDICINE

## 2025-01-14 PROCEDURE — 71101 X-RAY EXAM UNILAT RIBS/CHEST: CPT

## 2025-01-14 PROCEDURE — 70200 X-RAY EXAM OF EYE SOCKETS: CPT

## 2025-01-14 NOTE — PATIENT INSTRUCTIONS
"Patient Education     Routine physical for adults   The Basics   Written by the doctors and editors at [facility]   What is a physical? -- A physical is a routine visit, or \"check-up,\" with your doctor. You might also hear it called a \"wellness visit\" or \"preventive visit.\"  During each visit, the doctor will:   Ask about your physical and mental health   Ask about your habits, behaviors, and lifestyle   Do an exam   Give you vaccines if needed   Talk to you about any medicines you take   Give advice about your health   Answer your questions  Getting regular check-ups is an important part of taking care of your health. It can help your doctor find and treat any problems you have. But it's also important for preventing health problems.  A routine physical is different from a \"sick visit.\" A sick visit is when you see a doctor because of a health concern or problem. Since physicals are scheduled ahead of time, you can think about what you want to ask the doctor.  How often should I get a physical? -- It depends on your age and health. In general, for people age 21 years and older:   If you are younger than 50 years, you might be able to get a physical every 3 years.   If you are 50 years or older, your doctor might recommend a physical every year.  If you have an ongoing health condition, like diabetes or high blood pressure, your doctor will probably want to see you more often.  What happens during a physical? -- In general, each visit will include:   Physical exam - The doctor or nurse will check your height, weight, heart rate, and blood pressure. They will also look at your eyes and ears. They will ask about how you are feeling and whether you have any symptoms that bother you.   Medicines - It's a good idea to bring a list of all the medicines you take to each doctor visit. Your doctor will talk to you about your medicines and answer any questions. Tell them if you are having any side effects that bother you. You " "should also tell them if you are having trouble paying for any of your medicines.   Habits and behaviors - This includes:   Your diet   Your exercise habits   Whether you smoke, drink alcohol, or use drugs   Whether you are sexually active   Whether you feel safe at home  Your doctor will talk to you about things you can do to improve your health and lower your risk of health problems. They will also offer help and support. For example, if you want to quit smoking, they can give you advice and might prescribe medicines. If you want to improve your diet or get more physical activity, they can help you with this, too.   Lab tests, if needed - The tests you get will depend on your age and situation. For example, your doctor might want to check your:   Cholesterol   Blood sugar   Iron level   Vaccines - The recommended vaccines will depend on your age, health, and what vaccines you already had. Vaccines are very important because they can prevent certain serious or deadly infections.   Discussion of screening - \"Screening\" means checking for diseases or other health problems before they cause symptoms. Your doctor can recommend screening based on your age, risk, and preferences. This might include tests to check for:   Cancer, such as breast, prostate, cervical, ovarian, colorectal, prostate, lung, or skin cancer   Sexually transmitted infections, such as chlamydia and gonorrhea   Mental health conditions like depression and anxiety  Your doctor will talk to you about the different types of screening tests. They can help you decide which screenings to have. They can also explain what the results might mean.   Answering questions - The physical is a good time to ask the doctor or nurse questions about your health. If needed, they can refer you to other doctors or specialists, too.  Adults older than 65 years often need other care, too. As you get older, your doctor will talk to you about:   How to prevent falling at " home   Hearing or vision tests   Memory testing   How to take your medicines safely   Making sure that you have the help and support you need at home  All topics are updated as new evidence becomes available and our peer review process is complete.  This topic retrieved from [employer] on: May 02, 2024.  Topic 155115 Version 1.0  Release: 32.4.3 - C32.122  © 2024 UpToDate, Inc. and/or its affiliates. All rights reserved.  Consumer Information Use and Disclaimer   Disclaimer: This generalized information is a limited summary of diagnosis, treatment, and/or medication information. It is not meant to be comprehensive and should be used as a tool to help the user understand and/or assess potential diagnostic and treatment options. It does NOT include all information about conditions, treatments, medications, side effects, or risks that may apply to a specific patient. It is not intended to be medical advice or a substitute for the medical advice, diagnosis, or treatment of a health care provider based on the health care provider's examination and assessment of a patient's specific and unique circumstances. Patients must speak with a health care provider for complete information about their health, medical questions, and treatment options, including any risks or benefits regarding use of medications. This information does not endorse any treatments or medications as safe, effective, or approved for treating a specific patient. UpToDate, Inc. and its affiliates disclaim any warranty or liability relating to this information or the use thereof.The use of this information is governed by the Terms of Use, available at https://www.[employer] Mobileuwer.com/en/know/clinical-effectiveness-terms. 2024© UpToDate, Inc. and its affiliates and/or licensors. All rights reserved.  Copyright   © 2024 UpToDate, Inc. and/or its affiliates. All rights reserved.

## 2025-01-14 NOTE — PROGRESS NOTES
Name: Elizabeth Reed      : 1965      MRN: [id number]  Encounter Provider: El Melissa DO  Encounter Date: 2025   Encounter department: [facility]  :  Assessment & Plan  Contusion of globe of right eye, initial encounter  Reviewed patient symptoms today.  At this time, she did sustain a fall approximately 4 days ago.  She still has mild discomfort over her orbit as well as her right chest wall.  Will check x-rays to rule out gross abnormalities.  Orders:    XR orbits 4+ vw; Future    Daytime somnolence  Reviewed patient symptoms today.  Symptoms appear concerning for possible sleep apnea.  Orders:    Ambulatory Referral to Sleep Medicine; Future    Rib pain on right side    Orders:    XR ribs right w pa chest min 3 views; Future    XR orbits 4+ vw; Future    Gastroesophageal reflux disease without esophagitis         Annual physical exam         Melena         BMI 29.0-29.9,adult         Screening for colon cancer    Orders:    Ambulatory Referral to Gastroenterology; Future    Screening for iron deficiency anemia    Orders:    CBC; Future    Screening for diabetes mellitus    Orders:    Comprehensive metabolic panel; Future    Hemoglobin A1C; Future    Screening for cholesterol level    Orders:    Lipid Panel with Direct LDL reflex; Future    Screening for thyroid disorder    Orders:    TSH, 3rd generation with Free T4 reflex; Future    Snoring    Orders:    Ambulatory Referral to Sleep Medicine; Future    Fall in home, initial encounter    Orders:    XR orbits 4+ vw; Future           History of Present Illness     Chest Pain   This is a new problem. Episode onset: 4 days ago. The onset quality is sudden. The problem occurs intermittently. The problem has been unchanged. The pain is present in the lateral region. The pain is at a severity of 4/10. The pain is moderate. The quality of the pain is described as dull. The pain does not radiate. Pertinent negatives include no  "abdominal pain, back pain, claudication, cough, diaphoresis, dizziness, fever, headaches, nausea, numbness, palpitations or shortness of breath.     Patient also has been having complaints today of intermittent abdominal discomfort.  She has pain radiating all throughout her abdomen.  She has had associated melena.  Review of Systems   Constitutional:  Negative for activity change, chills, diaphoresis, fatigue and fever.   HENT:  Negative for congestion, ear pain, sinus pressure and sore throat.    Eyes:  Negative for redness, itching and visual disturbance.   Respiratory:  Negative for cough and shortness of breath.    Cardiovascular:  Positive for chest pain. Negative for palpitations and claudication.   Gastrointestinal:  Negative for abdominal pain, diarrhea and nausea.   Endocrine: Negative for cold intolerance and heat intolerance.   Genitourinary:  Negative for dysuria, flank pain and frequency.   Musculoskeletal:  Negative for arthralgias, back pain, gait problem and myalgias.   Skin:  Negative for color change.   Allergic/Immunologic: Negative for environmental allergies.   Neurological:  Negative for dizziness, numbness and headaches.   Psychiatric/Behavioral:  Negative for behavioral problems and sleep disturbance.        Objective   /80 (BP Location: Left arm, Patient Position: Sitting, Cuff Size: Large)   Pulse 82   Temp 98.4 °F (36.9 °C) (Temporal)   Ht 5' 5\" (1.651 m)   Wt 81.5 kg (179 lb 9.6 oz)   LMP 04/30/2019   SpO2 98%   BMI 29.89 kg/m²      Physical Exam  Vitals reviewed.   Constitutional:       General: She is not in acute distress.     Appearance: Normal appearance. She is well-developed.   HENT:      Head: Normocephalic and atraumatic.      Right Ear: Tympanic membrane, ear canal and external ear normal. There is no impacted cerumen.      Left Ear: Tympanic membrane, ear canal and external ear normal. There is no impacted cerumen.      Nose: Nose normal. No congestion or " rhinorrhea.      Mouth/Throat:      Mouth: Mucous membranes are moist.      Pharynx: No oropharyngeal exudate or posterior oropharyngeal erythema.   Eyes:      General: No scleral icterus.        Right eye: No discharge.         Left eye: No discharge.      Extraocular Movements: Extraocular movements intact.      Conjunctiva/sclera: Conjunctivae normal.      Pupils: Pupils are equal, round, and reactive to light.   Neck:      Trachea: No tracheal deviation.   Cardiovascular:      Rate and Rhythm: Normal rate and regular rhythm.      Pulses: Normal pulses.           Dorsalis pedis pulses are 2+ on the right side and 2+ on the left side.        Posterior tibial pulses are 2+ on the right side and 2+ on the left side.      Heart sounds: Normal heart sounds. No murmur heard.     No friction rub. No gallop.   Pulmonary:      Effort: Pulmonary effort is normal. No respiratory distress.      Breath sounds: Normal breath sounds. No wheezing, rhonchi or rales.   Abdominal:      General: Bowel sounds are normal. There is no distension.      Palpations: Abdomen is soft.      Tenderness: There is no abdominal tenderness. There is no guarding or rebound.   Musculoskeletal:         General: Normal range of motion.      Cervical back: Normal range of motion and neck supple.      Right lower leg: No edema.      Left lower leg: No edema.   Lymphadenopathy:      Head:      Right side of head: No submental or submandibular adenopathy.      Left side of head: No submental or submandibular adenopathy.      Cervical: No cervical adenopathy.      Right cervical: No superficial, deep or posterior cervical adenopathy.     Left cervical: No superficial, deep or posterior cervical adenopathy.   Skin:     General: Skin is warm and dry.      Findings: No erythema.   Neurological:      General: No focal deficit present.      Mental Status: She is alert and oriented to person, place, and time.      Cranial Nerves: No cranial nerve deficit.       Sensory: Sensation is intact. No sensory deficit.      Motor: Motor function is intact.   Psychiatric:         Attention and Perception: Attention and perception normal.         Mood and Affect: Mood is not anxious or depressed.         Speech: Speech normal.         Behavior: Behavior normal.         Thought Content: Thought content normal.         Judgment: Judgment normal.

## 2025-01-14 NOTE — PROGRESS NOTES
Adult Annual Physical  Name: Elizabeth Reed      : 1965      MRN: [id number]  Encounter Provider: El Melissa DO  Encounter Date: 2025   Encounter department: [facility]    Assessment & Plan  Annual physical exam         BMI 29.0-29.9,adult         Screening for colon cancer         Screening for iron deficiency anemia         Screening for diabetes mellitus         Screening for cholesterol level         Screening for thyroid disorder           Immunizations and preventive care screenings were discussed with patient today. Appropriate education was printed on patient's after visit summary.    Counseling:  Alcohol/drug use: discussed moderation in alcohol intake, the recommendations for healthy alcohol use, and avoidance of illicit drug use.  Dental Health: discussed importance of regular tooth brushing, flossing, and dental visits.  Injury prevention: discussed safety/seat belts, safety helmets, smoke detectors, carbon monoxide detectors, and smoking near bedding or upholstery.  Sexual health: discussed sexually transmitted diseases, partner selection, use of condoms, avoidance of unintended pregnancy, and contraceptive alternatives.  Exercise: the importance of regular exercise/physical activity was discussed. Recommend exercise 3-5 times per week for at least 30 minutes.          History of Present Illness     Adult Annual Physical:  Patient presents for annual physical.     Diet and Physical Activity:  - Diet/Nutrition: well balanced diet.  - Exercise: walking.    Depression Screening:  - PHQ-2 Score: 0    General Health:  - Sleep: sleeps well.  - Hearing: normal hearing bilateral ears.  - Vision: goes for regular eye exams.  - Dental: regular dental visits.    /GYN Health:  - Follows with GYN: yes.   - Menopause: postmenopausal.     Review of Systems   Constitutional:  Negative for activity change, chills, fatigue and fever.   HENT:  Negative for congestion, ear pain,  sinus pressure and sore throat.    Eyes:  Negative for redness, itching and visual disturbance.   Respiratory:  Negative for cough and shortness of breath.    Cardiovascular:  Negative for chest pain and palpitations.   Gastrointestinal:  Negative for abdominal pain, diarrhea and nausea.   Endocrine: Negative for cold intolerance and heat intolerance.   Genitourinary:  Negative for dysuria, flank pain and frequency.   Musculoskeletal:  Negative for arthralgias, back pain, gait problem and myalgias.   Skin:  Negative for color change.   Allergic/Immunologic: Negative for environmental allergies.   Neurological:  Negative for dizziness, numbness and headaches.   Psychiatric/Behavioral:  Negative for behavioral problems and sleep disturbance.      Medical History Reviewed by provider this encounter:  Tobacco  Allergies  Meds  Problems  Med Hx  Surg Hx  Fam Hx     .  Current Outpatient Medications on File Prior to Visit   Medication Sig Dispense Refill    ketorolac (TORADOL) 10 mg tablet Take 1 tablet (10 mg total) by mouth daily as needed for severe pain Max 1/day, 3/week, 10/month. Do not use with other OTC pain meds. (Patient not taking: Reported on 2025) 10 tablet 6    magnesium Oxide (MAG-OX) 400 mg TABS Can take 1 tab po daily or if not wanting daily pill, at least take 1 tab PO prn migraine (Patient not taking: Reported on 2025) 90 tablet 4    Probiotic Product (TruBiotics) CAPS Take 1 capsule by mouth daily (Patient not taking: Reported on 2023) 90 capsule 1    rimegepant sulfate (Nurtec) 75 mg TBDP Take one NURTEC 75 mg at onset under tongue. Limit 1 in 24 hours. (Patient not taking: Reported on 2024) 16 tablet 11     No current facility-administered medications on file prior to visit.      Social History     Tobacco Use    Smoking status: Former     Current packs/day: 0.00     Types: Cigarettes     Quit date:      Years since quittin.0    Smokeless tobacco: Never    Tobacco  "comments:     Quit   Vaping Use    Vaping status: Never Used   Substance and Sexual Activity    Alcohol use: Yes     Comment: social    Drug use: No    Sexual activity: Not Currently     Birth control/protection: Post-menopausal       Objective   /80 (BP Location: Left arm, Patient Position: Sitting, Cuff Size: Large)   Pulse 82   Temp 98.4 °F (36.9 °C) (Temporal)   Ht 5' 5\" (1.651 m)   Wt 81.5 kg (179 lb 9.6 oz)   LMP 04/30/2019   SpO2 98%   BMI 29.89 kg/m²     Physical Exam  Vitals reviewed.   Constitutional:       General: She is not in acute distress.     Appearance: Normal appearance. She is well-developed.   HENT:      Head: Normocephalic and atraumatic.      Right Ear: Tympanic membrane, ear canal and external ear normal. There is no impacted cerumen.      Left Ear: Tympanic membrane, ear canal and external ear normal. There is no impacted cerumen.      Nose: Nose normal. No congestion or rhinorrhea.      Mouth/Throat:      Mouth: Mucous membranes are moist.      Pharynx: No oropharyngeal exudate or posterior oropharyngeal erythema.   Eyes:      General: No scleral icterus.        Right eye: No discharge.         Left eye: No discharge.      Extraocular Movements: Extraocular movements intact.      Conjunctiva/sclera: Conjunctivae normal.      Pupils: Pupils are equal, round, and reactive to light.   Neck:      Trachea: No tracheal deviation.   Cardiovascular:      Rate and Rhythm: Normal rate and regular rhythm.      Pulses: Normal pulses.           Dorsalis pedis pulses are 2+ on the right side and 2+ on the left side.        Posterior tibial pulses are 2+ on the right side and 2+ on the left side.      Heart sounds: Normal heart sounds. No murmur heard.     No friction rub. No gallop.   Pulmonary:      Effort: Pulmonary effort is normal. No respiratory distress.      Breath sounds: Normal breath sounds. No wheezing, rhonchi or rales.   Abdominal:      General: Bowel sounds are normal. There is " no distension.      Palpations: Abdomen is soft.      Tenderness: There is no abdominal tenderness. There is no guarding or rebound.   Musculoskeletal:         General: Normal range of motion.      Cervical back: Normal range of motion and neck supple.      Right lower leg: No edema.      Left lower leg: No edema.   Lymphadenopathy:      Head:      Right side of head: No submental or submandibular adenopathy.      Left side of head: No submental or submandibular adenopathy.      Cervical: No cervical adenopathy.      Right cervical: No superficial, deep or posterior cervical adenopathy.     Left cervical: No superficial, deep or posterior cervical adenopathy.   Skin:     General: Skin is warm and dry.      Findings: No erythema.   Neurological:      General: No focal deficit present.      Mental Status: She is alert and oriented to person, place, and time.      Cranial Nerves: No cranial nerve deficit.      Sensory: Sensation is intact. No sensory deficit.      Motor: Motor function is intact.   Psychiatric:         Attention and Perception: Attention and perception normal.         Mood and Affect: Mood is not anxious or depressed.         Speech: Speech normal.         Behavior: Behavior normal.         Thought Content: Thought content normal.         Judgment: Judgment normal.

## 2025-01-17 ENCOUNTER — RESULTS FOLLOW-UP (OUTPATIENT)
Dept: FAMILY MEDICINE CLINIC | Facility: CLINIC | Age: 60
End: 2025-01-17

## 2025-01-17 ENCOUNTER — TELEPHONE (OUTPATIENT)
Age: 60
End: 2025-01-17

## 2025-01-17 NOTE — TELEPHONE ENCOUNTER
Pt called to request phone number for gastro and sleep medicine. Gave the phone number for sleep med. Pt said she didn't want to go to the location listed on the gastro. She would like it changed to the first location that she went to for a colonoscopy about 5 years ago. She said it was in a [address] on [address] in [place]. Please advise if this can be changed and inform pt so she can schedule.

## 2025-01-27 ENCOUNTER — CONSULT (OUTPATIENT)
Dept: FAMILY MEDICINE CLINIC | Facility: CLINIC | Age: 60
End: 2025-01-27
Payer: COMMERCIAL

## 2025-01-27 VITALS
WEIGHT: 181 LBS | DIASTOLIC BLOOD PRESSURE: 90 MMHG | TEMPERATURE: 96.9 F | BODY MASS INDEX: 30.16 KG/M2 | SYSTOLIC BLOOD PRESSURE: 142 MMHG | HEART RATE: 90 BPM | OXYGEN SATURATION: 96 % | HEIGHT: 65 IN

## 2025-01-27 DIAGNOSIS — Z01.818 PRE-OP EXAMINATION: Primary | ICD-10-CM

## 2025-01-27 DIAGNOSIS — H25.9 AGE-RELATED CATARACT OF BOTH EYES, UNSPECIFIED AGE-RELATED CATARACT TYPE: ICD-10-CM

## 2025-01-27 PROCEDURE — 99214 OFFICE O/P EST MOD 30 MIN: CPT

## 2025-01-27 NOTE — PROGRESS NOTES
Pre-operative Clearance  Name: Elizabeth Reed      : 1965      MRN: [id number]  Encounter Provider: Yarelis Chang PA-C  Encounter Date: 2025   Encounter department: [facility]    Assessment & Plan  Pre-op examination  Patient presents today for preoperative clearance for upcoming surgery. Patient's medical history and medication list were reviewed and updated accordingly. Patient feels in their usual state of health with no acute concerns.     No EKG or labs needed as patient is receiving low risk anesthesia and has no major cardiac conditions or recent diagnoses. Physical examination performed today which is overall unremarkable, vital signs are stable as well. Patient has been feeling well with no recent illnesses or hospitalizations. Patient has limited medical history and takes no current medications.     I find patient to be medically stable with low perioperative risk for planned cataract repair and I have no perioperative concerns.          Age-related cataract of both eyes, unspecified age-related cataract type  Patient has been recommended to have bilateral cataracts repaired, has discussed risks and benefits of surgery with her specialist and would like to proceed.        Pre-operative Clearance:     Revised Cardiac Risk Index:  RCI RISK CLASS I (0 risk factors, risk of major cardiac complications approximately 0.5%)    Clearance:  Patient is medically optimized (CLEARED) for proposed surgery without any additional cardiac testing.      Medication Instructions:   - Avoid herbs or non-directed vitamins one week prior to surgery    - Avoid aspirin containing medications or non-steroidal anti-inflammatory drugs one week preceding surgery    - May take tylenol for pain up until the night before surgery         History of Present Illness     Pre-op Exam  Surgery: cataract  Anticipated Date of Surgery: 2/3/2025; 2025  Surgeon: Dr. Orozco    Patient presents today  for preoperative clearance for upcoming surgery. Surgery information is as follows:     Previous Anesthesia: Yes  Complications from Anesthesia: No  Bleeding and Clotting Disorders: No   Blood Thinners: No  Pertinent PMH: GERD,    Cardiac Hx: HLD   Pulmonary Hx: none  Activity Tolerance (walk 4 blocks without issues, can walk up 2 flights of stairs): Yes  Home Safety (patient feels safe at home post-op): Yes    Smoking: No  Alcohol Use: Yes, social  Illicit Drug Use: No    Patient feels in their usual state of health with no acute concerns.     Previous history of bleeding disorders or clots?: No  Previous Anesthesia reaction?: No  Prolonged steroid use in the last 6 months?: No    Assessment of Cardiac Risk:   - Unstable or severe angina or MI in the last 6 weeks or history of stent placement in the last year?: No   - Decompensated heart failure (e.g. New onset heart failure, NYHA  Class IV heart failure, or worsening existing heart failure)?: No  - Significant arrhythmias such as high grade AV block, symptomatic ventricular arrhythmia, newly recognized ventricular tachycardia, supraventricular tachycardia with resting heart rate >100, or symptomatic bradycardia?: No  - Severe heart valve disease including aortic stenosis or symptomatic mitral stenosis?: No      Pre-operative Risk Factors:  Elevated-risk surgery: No    History of cerebrovascular disease: No    History of ischemic heart disease: No  Pre-operative treatment with insulin: No  Pre-operative creatinine >2 mg/dL: No    History of congestive heart failure: No    Review of Systems   Constitutional:  Negative for chills, fever and unexpected weight change.   Respiratory:  Negative for cough, chest tightness and shortness of breath.    Cardiovascular:  Negative for chest pain and palpitations.   Gastrointestinal:  Negative for abdominal pain, blood in stool and vomiting.   Genitourinary:  Negative for dysuria, hematuria and menstrual problem.  "  Musculoskeletal:  Negative for arthralgias, back pain and myalgias.   Neurological:  Negative for dizziness, seizures, syncope and headaches.   Hematological:  Does not bruise/bleed easily.   Psychiatric/Behavioral:  Negative for behavioral problems and confusion.    All other systems reviewed and are negative.    Past Medical History   Past Medical History:   Diagnosis Date   • Colon polyp    • Head injury    • Knee injury     right knee   • Migraine      Past Surgical History:   Procedure Laterality Date   • COLONOSCOPY     • LAPAROSCOPY      Exploratory   • [place] ESOPHAGOGASTRODUODENOSCOPY TRANSORAL DIAGNOSTIC N/A 2018    Procedure: ESOPHAGOGASTRODUODENOSCOPY (EGD);  Surgeon: Zulma Rodriguez MD;  Location: [facility] GI LAB;  Service: Gastroenterology     Family History   Problem Relation Age of Onset   • Diabetes Mother    • Hypertension Mother    • Heart disease Mother    • Hypertension Sister    • Dementia Sister    • Diabetes Brother    • Hypertension Brother    • Heart disease Brother    • Heart disease Maternal Grandmother    • Stroke Family    • Arthritis Family    • Colon cancer Neg Hx      Social History     Tobacco Use   • Smoking status: Former     Current packs/day: 0.00     Types: Cigarettes     Quit date:      Years since quittin.0   • Smokeless tobacco: Never   • Tobacco comments:     Quit   Vaping Use   • Vaping status: Never Used   Substance and Sexual Activity   • Alcohol use: Yes     Comment: social   • Drug use: No   • Sexual activity: Not Currently     Birth control/protection: Post-menopausal     No current outpatient medications on file prior to visit.     Allergies   Allergen Reactions   • Aspirin Anaphylaxis   • Penicillin V Anaphylaxis     Objective   /90   Pulse 90   Temp (!) 96.9 °F (36.1 °C)   Ht 5' 5\" (1.651 m)   Wt 82.1 kg (181 lb)   LMP 2019   SpO2 96%   BMI 30.12 kg/m²     Physical Exam  Vitals and nursing note reviewed.   Constitutional:       " General: She is not in acute distress.     Appearance: Normal appearance. She is well-developed. She is not ill-appearing.   HENT:      Head: Normocephalic and atraumatic.      Right Ear: Tympanic membrane normal.      Left Ear: Tympanic membrane normal.      Nose: Nose normal.      Mouth/Throat:      Mouth: Mucous membranes are moist.      Pharynx: Oropharynx is clear. No posterior oropharyngeal erythema.   Eyes:      Pupils: Pupils are equal, round, and reactive to light.   Neck:      Vascular: No carotid bruit.   Cardiovascular:      Rate and Rhythm: Normal rate and regular rhythm.      Pulses: Normal pulses.      Heart sounds: No murmur heard.  Pulmonary:      Effort: Pulmonary effort is normal. No respiratory distress.      Breath sounds: Normal breath sounds.   Abdominal:      General: Bowel sounds are normal. There is no distension.      Palpations: Abdomen is soft.      Tenderness: There is no abdominal tenderness.   Musculoskeletal:         General: No swelling.      Cervical back: Normal range of motion.      Right lower leg: No edema.      Left lower leg: No edema.   Lymphadenopathy:      Cervical: No cervical adenopathy.   Skin:     General: Skin is warm and dry.   Neurological:      General: No focal deficit present.      Mental Status: She is alert and oriented to person, place, and time. Mental status is at baseline.   Psychiatric:         Mood and Affect: Mood normal.         Behavior: Behavior normal.         Cognition and Memory: Cognition normal.         Judgment: Judgment normal.           Yarelis Chang PA-C

## 2025-01-27 NOTE — LETTER
January 27, 2025     Patient: Elizabeth Reed  YOB: 1965  Date of Visit: 1/27/2025      To Whom it May Concern:    Elizabeth Reed is under my professional care. Elizabeth was seen in my office on 1/27/2025. Elizabeth may return to work on 1/28/2024 .    If you have any questions or concerns, please don't hesitate to call.         Sincerely,          Yarelis Chang PA-C        CC: No Recipients

## 2025-02-05 ENCOUNTER — TELEPHONE (OUTPATIENT)
Age: 60
End: 2025-02-05

## 2025-02-05 NOTE — TELEPHONE ENCOUNTER
Patients GI provider:       Number to return call: ( [phone]     Reason for call: Pt calling to schedule an egd/colonoscopy.  Scheduled a consult pt requesting Dr. Renee perform the procedure verified since the provider is leaving I could not guarantee he would be the one performing the procedures but when she schedules she can request him to confirm his availability.     Scheduled procedure/appointment date if applicable: Appt 3/3/25

## 2025-02-06 ENCOUNTER — TRANSCRIBE ORDERS (OUTPATIENT)
Dept: SLEEP CENTER | Facility: CLINIC | Age: 60
End: 2025-02-06

## 2025-02-06 DIAGNOSIS — R40.0 DAYTIME SOMNOLENCE: ICD-10-CM

## 2025-02-06 DIAGNOSIS — R06.83 SNORING: Primary | ICD-10-CM

## 2025-03-03 ENCOUNTER — OFFICE VISIT (OUTPATIENT)
Dept: GASTROENTEROLOGY | Facility: CLINIC | Age: 60
End: 2025-03-03
Payer: COMMERCIAL

## 2025-03-03 VITALS
TEMPERATURE: 98 F | WEIGHT: 175 LBS | HEIGHT: 65 IN | DIASTOLIC BLOOD PRESSURE: 80 MMHG | SYSTOLIC BLOOD PRESSURE: 137 MMHG | BODY MASS INDEX: 29.16 KG/M2

## 2025-03-03 DIAGNOSIS — K92.1 MELENA: ICD-10-CM

## 2025-03-03 DIAGNOSIS — R10.30 LOWER ABDOMINAL PAIN: Primary | ICD-10-CM

## 2025-03-03 DIAGNOSIS — R19.4 CHANGE IN BOWEL HABITS: ICD-10-CM

## 2025-03-03 DIAGNOSIS — K86.2 PANCREATIC CYST: ICD-10-CM

## 2025-03-03 PROCEDURE — 99204 OFFICE O/P NEW MOD 45 MIN: CPT

## 2025-03-03 RX ORDER — SODIUM CHLORIDE, SODIUM LACTATE, POTASSIUM CHLORIDE, CALCIUM CHLORIDE 600; 310; 30; 20 MG/100ML; MG/100ML; MG/100ML; MG/100ML
125 INJECTION, SOLUTION INTRAVENOUS CONTINUOUS
OUTPATIENT
Start: 2025-03-03

## 2025-03-03 NOTE — PATIENT INSTRUCTIONS
Scheduled date of EGD/colonoscopy (as of today):05/09/2025  Physician performing EGD/colonoscopy:Kayden  Location of EGD/colonoscopy:[place]  Desired bowel prep reviewed with patient: Miralax / Dulcolax  Instructions reviewed with patient by:STEFANI  Clearances:   NONE    Follow up will be 08/04/2025 at 3:00 pm   Patient will  do labs as well .

## 2025-03-03 NOTE — PROGRESS NOTES
Name: Elizabeth Reed      : 1965      MRN: [id number]  Encounter Provider: Regla Smith PA-C  Encounter Date: 3/3/2025   Encounter department: [facility]  :  Assessment & Plan  Lower abdominal pain  Patient with lower abdominal/pelvic pain over the last few months.  Obtain labs as ordered by PCP, will check celiac serologies.  Recommend she make appointment with her gynecologist as well.  Orders:    Colonoscopy; Future    Celiac Panel/Adult; Future    Change in bowel habits  Does note occasional diarrhea.    Plan colonoscopy.  Avoid food triggers, can use Imodium as needed.  Orders:    Colonoscopy; Future    Melena  Patient notes a few episodes of black tarry stools.  Recommend she obtain labs as ordered by PCP.  Continue with PPI, plan for EGD.  Orders:    EGD; Future    Pancreatic cyst  Patient had MRI/MRCP 2024 with 1.3 cm pancreatic cyst, unchanged and without concerning features.  She is following with surgical oncology for this.           History of Present Illness   HPI  Elizabeth Reed is a 59 y.o. female with PMH of GERD, pancreatic cyst, migraines, HLD who presents to discuss EGD/colonoscopy.  Patient reports intermittent lower abdominal/pelvic pain.  Her pain will alternate between the right and left sides, no radiation of pain elsewhere.  No change in symptoms with eating, drinking, bowel movements.  She does note occasional diarrhea, mostly after eating fatty foods.  She has a long history of heartburn, controlled on PPI daily.  Occasionally she will have black tarry stools.  No NSAID use.  No new medications or supplements.  She is not taking iron or Pepto-Bismol.  Denies unintentional weight loss, nausea, vomiting, hematemesis, melena.  Denies chest pain, shortness of breath, lightheadedness, dizziness.    History obtained from: patient    Prior imaging/procedures:  MRI/MRCP 2024: 1.3 cm pancreatic uncinate process cystic  "lesion  Colonoscopy 2020: Normal -5-year recall    Review of Systems   Constitutional:  Negative for appetite change, chills and fever.   Respiratory:  Negative for shortness of breath.    Gastrointestinal:  Positive for abdominal pain. Negative for blood in stool, constipation, diarrhea, nausea and vomiting.     Medical History Reviewed by provider this encounter:  Tobacco  Allergies  Meds  Problems  Med Hx  Surg Hx  Fam Hx     .  No current outpatient medications on file prior to visit.     No current facility-administered medications on file prior to visit.      Social History     Tobacco Use    Smoking status: Former     Current packs/day: 0.00     Types: Cigarettes     Quit date:      Years since quittin.1    Smokeless tobacco: Never    Tobacco comments:     Quit   Vaping Use    Vaping status: Never Used   Substance and Sexual Activity    Alcohol use: Yes     Comment: social    Drug use: No    Sexual activity: Not Currently     Birth control/protection: Post-menopausal        Objective   /80 (BP Location: Left arm, Patient Position: Sitting, Cuff Size: Large)   Temp 98 °F (36.7 °C) (Tympanic)   Ht 5' 5\" (1.651 m)   Wt 79.4 kg (175 lb)   LMP 2019   BMI 29.12 kg/m²      Physical Exam  Vitals reviewed.   Constitutional:       General: She is not in acute distress.     Appearance: She is not ill-appearing.   HENT:      Head: Normocephalic and atraumatic.   Cardiovascular:      Rate and Rhythm: Normal rate and regular rhythm.   Pulmonary:      Effort: Pulmonary effort is normal.      Breath sounds: Normal breath sounds.   Abdominal:      General: Abdomen is flat. Bowel sounds are normal. There is no distension.      Palpations: Abdomen is soft.      Tenderness: There is no abdominal tenderness.   Skin:     General: Skin is warm and dry.   Neurological:      Mental Status: She is alert. Mental status is at baseline.           "

## 2025-03-04 ENCOUNTER — APPOINTMENT (OUTPATIENT)
Dept: LAB | Age: 60
End: 2025-03-04
Payer: COMMERCIAL

## 2025-03-04 DIAGNOSIS — Z13.29 SCREENING FOR THYROID DISORDER: ICD-10-CM

## 2025-03-04 DIAGNOSIS — R10.30 LOWER ABDOMINAL PAIN: ICD-10-CM

## 2025-03-04 DIAGNOSIS — Z13.0 SCREENING FOR IRON DEFICIENCY ANEMIA: ICD-10-CM

## 2025-03-04 DIAGNOSIS — K86.2 PANCREAS CYST: ICD-10-CM

## 2025-03-04 DIAGNOSIS — Z13.1 SCREENING FOR DIABETES MELLITUS: ICD-10-CM

## 2025-03-04 DIAGNOSIS — Z13.220 SCREENING FOR CHOLESTEROL LEVEL: ICD-10-CM

## 2025-03-04 LAB
ALBUMIN SERPL BCG-MCNC: 4.2 G/DL (ref 3.5–5)
ALP SERPL-CCNC: 91 U/L (ref 34–104)
ALT SERPL W P-5'-P-CCNC: 18 U/L (ref 7–52)
ANION GAP SERPL CALCULATED.3IONS-SCNC: 10 MMOL/L (ref 4–13)
AST SERPL W P-5'-P-CCNC: 21 U/L (ref 13–39)
BILIRUB SERPL-MCNC: 0.52 MG/DL (ref 0.2–1)
BUN SERPL-MCNC: 8 MG/DL (ref 5–25)
CALCIUM SERPL-MCNC: 9.1 MG/DL (ref 8.4–10.2)
CHLORIDE SERPL-SCNC: 105 MMOL/L (ref 96–108)
CHOLEST SERPL-MCNC: 186 MG/DL (ref ?–200)
CO2 SERPL-SCNC: 25 MMOL/L (ref 21–32)
CREAT SERPL-MCNC: 0.81 MG/DL (ref 0.6–1.3)
ERYTHROCYTE [DISTWIDTH] IN BLOOD BY AUTOMATED COUNT: 13.8 % (ref 11.6–15.1)
EST. AVERAGE GLUCOSE BLD GHB EST-MCNC: 114 MG/DL
GFR SERPL CREATININE-BSD FRML MDRD: 79 ML/MIN/1.73SQ M
GLUCOSE P FAST SERPL-MCNC: 99 MG/DL (ref 65–99)
HBA1C MFR BLD: 5.6 %
HCT VFR BLD AUTO: 41.9 % (ref 34.8–46.1)
HDLC SERPL-MCNC: 57 MG/DL
HGB BLD-MCNC: 13.8 G/DL (ref 11.5–15.4)
IGA SERPL-MCNC: 114 MG/DL (ref 66–433)
LDLC SERPL CALC-MCNC: 101 MG/DL (ref 0–100)
MCH RBC QN AUTO: 30.3 PG (ref 26.8–34.3)
MCHC RBC AUTO-ENTMCNC: 32.9 G/DL (ref 31.4–37.4)
MCV RBC AUTO: 92 FL (ref 82–98)
PLATELET # BLD AUTO: 286 THOUSANDS/UL (ref 149–390)
PMV BLD AUTO: 10 FL (ref 8.9–12.7)
POTASSIUM SERPL-SCNC: 4.5 MMOL/L (ref 3.5–5.3)
PROT SERPL-MCNC: 7 G/DL (ref 6.4–8.4)
RBC # BLD AUTO: 4.56 MILLION/UL (ref 3.81–5.12)
SODIUM SERPL-SCNC: 140 MMOL/L (ref 135–147)
TRIGL SERPL-MCNC: 142 MG/DL (ref ?–150)
TSH SERPL DL<=0.05 MIU/L-ACNC: 0.98 UIU/ML (ref 0.45–4.5)
WBC # BLD AUTO: 4.32 THOUSAND/UL (ref 4.31–10.16)

## 2025-03-04 PROCEDURE — 85027 COMPLETE CBC AUTOMATED: CPT

## 2025-03-04 PROCEDURE — 83036 HEMOGLOBIN GLYCOSYLATED A1C: CPT

## 2025-03-04 PROCEDURE — 36415 COLL VENOUS BLD VENIPUNCTURE: CPT

## 2025-03-04 PROCEDURE — 80053 COMPREHEN METABOLIC PANEL: CPT

## 2025-03-04 PROCEDURE — 80061 LIPID PANEL: CPT

## 2025-03-04 PROCEDURE — 84443 ASSAY THYROID STIM HORMONE: CPT

## 2025-03-04 PROCEDURE — 86364 TISS TRNSGLTMNASE EA IG CLAS: CPT

## 2025-03-04 PROCEDURE — 82784 ASSAY IGA/IGD/IGG/IGM EACH: CPT

## 2025-03-07 ENCOUNTER — TELEPHONE (OUTPATIENT)
Age: 60
End: 2025-03-07

## 2025-03-07 ENCOUNTER — RESULTS FOLLOW-UP (OUTPATIENT)
Dept: GASTROENTEROLOGY | Facility: CLINIC | Age: 60
End: 2025-03-07

## 2025-03-07 LAB — TTG IGA SER IA-ACNC: 54 U/ML (ref ?–10)

## 2025-03-07 NOTE — TELEPHONE ENCOUNTER
Pt returned a missed call from the office.    Relayed results to patient as per provider message.     El Melissa DO  3/6/2025 10:35 PM EST   Please call patient. Please advise her that her recent blood work all appeared clinically stable.  Thank you    Patient expressed understanding and did not have any further questions.

## 2025-03-07 NOTE — TELEPHONE ENCOUNTER
Please call patient and let her know I reviewed her lab results.  Her labs to evaluate for celiac disease did come back elevated.  This indicates she may have celiac disease, the next step would be endoscopy with biopsies of her small bowel to confirm this.  For now, I would continue to eat gluten as this can affect the test if she stops this.  The rest of her lab results were normal.    Dr. Monique, you are doing EGD/colonoscopy on this patient 5/5.  Can you take duodenal biopsies? Thanks!

## 2025-03-17 ENCOUNTER — HOSPITAL ENCOUNTER (OUTPATIENT)
Dept: SLEEP CENTER | Facility: CLINIC | Age: 60
Discharge: HOME | End: 2025-03-17
Payer: COMMERCIAL

## 2025-03-17 DIAGNOSIS — R40.0 DAYTIME SOMNOLENCE: ICD-10-CM

## 2025-03-17 DIAGNOSIS — R06.83 SNORING: ICD-10-CM

## 2025-03-17 PROCEDURE — G0399 HOME SLEEP TEST/TYPE 3 PORTA: HCPCS

## 2025-03-17 NOTE — PROGRESS NOTES
Home Sleep Study Documentation    HOME STUDY DEVICE: Noxturnal no                                           Maddie G3 yes      Pre-Sleep Home Study:    Set-up and instructions performed by: KS    Technician performed demonstration for Patient: yes    Return demonstration performed by Patient: yes    Written instructions provided to Patient: yes    Patient signed consent form: yes        Post-Sleep Home Study:    Additional comments by Patient: None    Home Sleep Study Failed:no:    Failure reason: N/A    Reported or Detected: N/A    Scored by: SKYLAR Spencer

## 2025-03-18 ENCOUNTER — OFFICE VISIT (OUTPATIENT)
Dept: OBGYN CLINIC | Facility: CLINIC | Age: 60
End: 2025-03-18

## 2025-03-18 VITALS
BODY MASS INDEX: 29.99 KG/M2 | HEIGHT: 65 IN | DIASTOLIC BLOOD PRESSURE: 83 MMHG | SYSTOLIC BLOOD PRESSURE: 138 MMHG | HEART RATE: 86 BPM | WEIGHT: 180 LBS

## 2025-03-18 DIAGNOSIS — Z11.3 SCREENING FOR STDS (SEXUALLY TRANSMITTED DISEASES): ICD-10-CM

## 2025-03-18 DIAGNOSIS — R10.2 PELVIC PAIN: Primary | ICD-10-CM

## 2025-03-18 PROCEDURE — 99213 OFFICE O/P EST LOW 20 MIN: CPT | Performed by: OBSTETRICS & GYNECOLOGY

## 2025-03-18 NOTE — PROGRESS NOTES
"Name: Elizabeth Reed      : 1965      MRN: [id number]  Encounter Provider: Ijeoma Farley MD  Encounter Date: 3/18/2025   Encounter department: [facility]  :  Assessment & Plan  Pelvic pain  Ongoing sporadic pelvic pain since 2024.   Given mild left adnexal tenderness on exam, recommend pelvic US to assess for intra-abdominal pathology contributing to patient's pelvic pain.   Orders:    US pelvis complete w transvaginal; Future        History of Present Illness   HPI  Elizabeth Reed is a 59 y.o. female who presents with complaint of pelvic pain since December. Pain is difficult for patient to describe, but states that she is \"aware of the pain\" and it feels like a \"quick stab\". Patient unable to give pain level. She states that the pain is not everday but is \"every so often\".     Pain is not associated with a particular position (standing vs sitting). She does not notice the pain with sexual intercourse. She does endorse having several partners. No concern for STD, no abnormal discharge. She is requesting gonorrhea/chlamydia testing. Declined additional testing.     Review of Systems   Constitutional:  Negative for chills and fever.   HENT:  Negative for ear pain and sore throat.    Eyes:  Negative for pain and visual disturbance.   Respiratory:  Negative for cough and shortness of breath.    Cardiovascular:  Negative for chest pain and palpitations.   Gastrointestinal:  Negative for abdominal pain, nausea and vomiting.   Genitourinary:  Positive for pelvic pain. Negative for dysuria, hematuria, vaginal bleeding and vaginal discharge.   Musculoskeletal:  Negative for arthralgias and back pain.   Skin:  Negative for color change and rash.   Neurological:  Negative for seizures and syncope.   All other systems reviewed and are negative.    Past Medical History   Past Medical History:   Diagnosis Date    Colon polyp     Head injury     Knee injury  " "   right knee    Migraine      Past Surgical History:   Procedure Laterality Date    COLONOSCOPY      LAPAROSCOPY      Exploratory    [place] ESOPHAGOGASTRODUODENOSCOPY TRANSORAL DIAGNOSTIC N/A 2018    Procedure: ESOPHAGOGASTRODUODENOSCOPY (EGD);  Surgeon: Zulma Rodriguez MD;  Location: [facility];  Service: Gastroenterology     Family History   Problem Relation Age of Onset    Diabetes Mother     Hypertension Mother     Heart disease Mother     Hypertension Sister     Dementia Sister     Diabetes Brother     Hypertension Brother     Heart disease Brother     Heart disease Maternal Grandmother     Stroke Family     Arthritis Family     Colon cancer Neg Hx       reports that she quit smoking about 35 years ago. Her smoking use included cigarettes. She has never used smokeless tobacco. She reports current alcohol use. She reports that she does not use drugs.  No current outpatient medications  Allergies   Allergen Reactions    Aspirin Anaphylaxis    Penicillin V Anaphylaxis      No current outpatient medications on file prior to visit.     No current facility-administered medications on file prior to visit.      Social History     Tobacco Use    Smoking status: Former     Current packs/day: 0.00     Types: Cigarettes     Quit date:      Years since quittin.2    Smokeless tobacco: Never    Tobacco comments:     Quit   Vaping Use    Vaping status: Never Used   Substance and Sexual Activity    Alcohol use: Yes     Comment: social    Drug use: No    Sexual activity: Yes     Partners: Male     Birth control/protection: Post-menopausal     Objective   /83 (BP Location: Right arm, Patient Position: Sitting, Cuff Size: Standard)   Pulse 86   Ht 5' 5\" (1.651 m)   Wt 81.6 kg (180 lb)   LMP 2019   BMI 29.95 kg/m²      Physical Exam  Vitals and nursing note reviewed.   Constitutional:       General: She is not in acute distress.     Appearance: She is well-developed.   HENT:      Head: " Normocephalic and atraumatic.   Eyes:      Conjunctiva/sclera: Conjunctivae normal.   Cardiovascular:      Rate and Rhythm: Normal rate.      Pulses: Normal pulses.   Pulmonary:      Effort: Pulmonary effort is normal. No respiratory distress.   Abdominal:      Palpations: Abdomen is soft.      Tenderness: There is no abdominal tenderness.   Genitourinary:     General: Normal vulva.      Comments: Normal appearing external genitalia, closed appearing multiparous cervix, minimal vaginal discharge  Bimanual exam: no adnexal masses; nontender mobile uterus; minimal left adnexal tenderness, no fullness appreciated  No tenderness on palpation of pelvic floor muscles.   Musculoskeletal:         General: No swelling.      Cervical back: Neck supple.   Skin:     General: Skin is warm and dry.      Capillary Refill: Capillary refill takes less than 2 seconds.   Neurological:      Mental Status: She is alert.   Psychiatric:         Mood and Affect: Mood normal.         Behavior: Behavior normal.       Ijeoma Farley MD  OBGYN PGY-4  03/18/25 5:22 PM

## 2025-03-18 NOTE — ASSESSMENT & PLAN NOTE
Ongoing sporadic pelvic pain since December 2024.   Given mild left adnexal tenderness on exam, recommend pelvic US to assess for intra-abdominal pathology contributing to patient's pelvic pain.   Orders:    US pelvis complete w transvaginal; Future

## 2025-03-20 PROCEDURE — 87591 N.GONORRHOEAE DNA AMP PROB: CPT

## 2025-03-20 PROCEDURE — 87491 CHLMYD TRACH DNA AMP PROBE: CPT

## 2025-03-21 PROBLEM — G47.33 OSA (OBSTRUCTIVE SLEEP APNEA): Status: ACTIVE | Noted: 2025-03-21

## 2025-03-21 LAB
C TRACH DNA SPEC QL NAA+PROBE: NEGATIVE
N GONORRHOEA DNA SPEC QL NAA+PROBE: NEGATIVE

## 2025-03-24 ENCOUNTER — RESULTS FOLLOW-UP (OUTPATIENT)
Dept: FAMILY MEDICINE CLINIC | Facility: CLINIC | Age: 60
End: 2025-03-24

## 2025-03-24 DIAGNOSIS — G47.34 SLEEP RELATED HYPOXIA: Primary | ICD-10-CM

## 2025-03-24 DIAGNOSIS — R29.818 SUSPECTED SLEEP APNEA: ICD-10-CM

## 2025-03-24 PROBLEM — R06.83 SNORING: Status: ACTIVE | Noted: 2025-03-24

## 2025-03-24 PROBLEM — R40.0 DAYTIME SOMNOLENCE: Status: ACTIVE | Noted: 2025-03-24

## 2025-03-24 PROCEDURE — 95806 SLEEP STUDY UNATT&RESP EFFT: CPT | Performed by: INTERNAL MEDICINE

## 2025-03-25 ENCOUNTER — HOSPITAL ENCOUNTER (OUTPATIENT)
Dept: RADIOLOGY | Age: 60
Discharge: HOME | End: 2025-03-25
Payer: COMMERCIAL

## 2025-03-25 DIAGNOSIS — R10.2 PELVIC PAIN: ICD-10-CM

## 2025-03-25 PROCEDURE — 76856 US EXAM PELVIC COMPLETE: CPT

## 2025-03-25 PROCEDURE — 76830 TRANSVAGINAL US NON-OB: CPT

## 2025-03-26 ENCOUNTER — RESULTS FOLLOW-UP (OUTPATIENT)
Dept: OBGYN CLINIC | Facility: CLINIC | Age: 60
End: 2025-03-26

## 2025-03-28 ENCOUNTER — TELEPHONE (OUTPATIENT)
Dept: SLEEP CENTER | Facility: CLINIC | Age: 60
End: 2025-03-28

## 2025-04-13 PROBLEM — L98.9 BENIGN SKIN LESION OF THIGH: Status: RESOLVED | Noted: 2022-11-09 | Resolved: 2025-04-13

## 2025-04-14 ENCOUNTER — ANNUAL EXAM (OUTPATIENT)
Dept: OBGYN CLINIC | Facility: CLINIC | Age: 60
End: 2025-04-14

## 2025-04-14 VITALS
HEART RATE: 84 BPM | SYSTOLIC BLOOD PRESSURE: 165 MMHG | BODY MASS INDEX: 30.82 KG/M2 | HEIGHT: 65 IN | DIASTOLIC BLOOD PRESSURE: 89 MMHG | WEIGHT: 185 LBS

## 2025-04-14 DIAGNOSIS — Z01.419 ENCOUNTER FOR ANNUAL ROUTINE GYNECOLOGICAL EXAMINATION: Primary | ICD-10-CM

## 2025-04-14 PROCEDURE — G0145 SCR C/V CYTO,THINLAYER,RESCR: HCPCS

## 2025-04-14 PROCEDURE — G0476 HPV COMBO ASSAY CA SCREEN: HCPCS

## 2025-04-14 NOTE — PROGRESS NOTES
"OB/GYN ANNUAL H&P    SUBJECTIVE:    Elizabeth Reed is a 59 y.o.  female who presents for annual well woman H&P.    GYN:  Doesn't pay attention to her periods and isn't sure if she's gone a year without a period  Reviewed perimenopausal / menopausal counseling and expectations  Encouraged to keep track of periods so we can determine menopause status  Pelvic pain:  Was recently evaluated on 3/18/25 for intermittent pelvic pain since 2024 with mild left adnexal tenderness on exam that day  Today, we reviewed that her pelvic US on 3/25/25 showed all normal findings  Today, reports no pain  Abnormal vaginal discharge: no  Genital lesions: no  Genital irritation or itching: no  Sexually active:  Only sexually active with male partners  Same male partner since about [place] time which she reports is a long stretch for her to not have any new partners  Reviewed recommendation for condoms for STI prevention (patient expressed understanding but declines)  Dyspareunia:  Did have pain with a \"rough\" partner at the end of last year  Currently, no dyspareunia with current partner  Patient thinks her pain may have been secondary to this prior partner  Contraception: declines because \"I don't believe in it\"  Reviewed possibility of pregnancy given unclear menopause status  Patient declines contraception at this time after counseling  STI history: none  Gynecologic surgical history:  Ectopic treated with \"tube scraping\" (presumably salpingostomy? Patient endorses laparoscopic surgery but feels confident no tubes were removed)  Menopausal / perimenopausal symptoms:  No hot flashes  No mood changes, feels \"very happy\"  No sleep disturbances  No vaginal dryness    OB:   female ( x10, SAB x8, ectopic x1)  Patient prefers to keep G's & P's private    :  Doing Kegel exercises regularly  Dysuria: no  Hematuria: no  Urgency: no  Frequency: no  Urinary incontinence: no    Breast:  Mass: no  Skin changes: " "no  Reddening: no  Dimpling: no  Pain: no  Nipple discharge: no    General:  Diet: organic plant-based diet  Exercise: country line dancing, works out 6 times weekly  Work: line sorter  Alcohol use: socially (occasionally Fridays, sometimes Saturdays will have 5 12 oz bottles of beer)  Tobacco use: not currently (quit at 26 yo)  Recreational drug use: no  Lives alone and feels safe and happy in her relationship with a single male partner currently    Family history:  Breast cancer: no  Uterine cancer: no  Ovarian cancer: no    Screening:  Cervical cancer screening:  Cervical biopsy of cervical cyst in 2022 showed mild atypia favoring reactive changes though couldn't exclude URTHY 1  Last pap smear on 2/5/24 showed NILM, HPV negative  Pap smear with co-testing collected today per patient preference (she didn't feel comfortable waiting 3 years per [facility] guidelines)  Breast cancer screening:  Last mammogram on 12/9/24 showed BI-RADS 1 (negative) bilaterally  Due for next routine screening mammogram 1 year from prior which was ordered today  Colon cancer screening:  Last colonoscopy on 9/4/20 showed small internal hemorrhoids but was otherwise normal  Due for next routine screening colonoscopy 5 years from prior (2025) which was previously ordered (reviewed recommendation to schedule this today, though follow up with GI is already scheduled for 8/4/25)  STI screening:  GC/CT negative 3/20/25  RPR, HIV, Hep B, Hep C negative on 11/14/25  Declines repeat testing today given no change in partners since last evaluation    Immunizations:  COVID: due for booster, counseled, declines  Flu: due, counseled, declines  Gardasil:  Not on file  Beyond age range for vaccination    Review of Systems:  Pertinent items are noted in HPI.    OBJECTIVE:    Vitals:   /89 (BP Location: Right arm, Patient Position: Sitting, Cuff Size: Standard)   Pulse 84   Ht 5' 5\" (1.651 m)   Wt 83.9 kg (185 lb)   LMP 04/30/2019   BMI 30.79 kg/m² "   - She was encouraged to follow up with her PCP regarding her elevated BP    Physical Exam  Constitutional:       General: She is not in acute distress.     Appearance: Normal appearance. She is not ill-appearing.   Genitourinary:      Urethral meatus normal.      No lesions in the vagina.      Genitourinary Comments: Stage 2 anterior and posterior prolapse (1 cm above the hymenal remnant)      Right Labia: No rash, tenderness, lesions, skin changes or Bartholin's cyst.     Left Labia: No tenderness, lesions, skin changes, Bartholin's cyst or rash.     No labial fusion noted.      Pelvic Shabbir Score: 5/5.     No vaginal discharge, erythema, tenderness, bleeding, ulceration or granulation tissue.      Anterior and posterior vaginal prolapse present.     No vaginal atrophy present.       Right Adnexa: not tender, not full, not palpable and no mass present.     Left Adnexa: not tender, not full, not palpable and no mass present.     Cervix is not absent.      No cervical motion tenderness, discharge, friability, lesion, polyp or nabothian cyst.      Uterus is not enlarged, fixed, tender, irregular or prolapsed.      No uterine mass detected.     Uterus is anteverted.   Breasts:     Shabbir Score is 5.      Breasts are symmetrical.      Breasts are soft.     Right: Present. No swelling, bleeding, inverted nipple, mass, nipple discharge, skin change, tenderness or breast implant.      Left: Present. No swelling, bleeding, inverted nipple, mass, nipple discharge, skin change, tenderness or breast implant.   HENT:      Mouth/Throat:      Mouth: Mucous membranes are moist.   Eyes:      Extraocular Movements: Extraocular movements intact.   Cardiovascular:      Rate and Rhythm: Normal rate and regular rhythm.      Heart sounds: Normal heart sounds.   Pulmonary:      Effort: Pulmonary effort is normal.      Breath sounds: Normal breath sounds.   Abdominal:      General: There is no distension.      Palpations: Abdomen is  soft.      Tenderness: There is no abdominal tenderness. There is no guarding.   Musculoskeletal:         General: No swelling.      Right lower leg: No edema.      Left lower leg: No edema.   Lymphadenopathy:      Upper Body:      Right upper body: No supraclavicular or axillary adenopathy.      Left upper body: No supraclavicular or axillary adenopathy.   Neurological:      General: No focal deficit present.      Mental Status: She is alert.   Skin:     General: Skin is warm and dry.      Capillary Refill: Capillary refill takes less than 2 seconds.      Coloration: Skin is not jaundiced or pale.   Psychiatric:         Mood and Affect: Mood normal.         Behavior: Behavior normal.         Thought Content: Thought content normal.         Judgment: Judgment normal.         ASSESSMENT:    Elizabeth Reed is a 59 y.o.  with normal gynecologic exam.    PLAN:    1. Encounter for annual routine gynecological examination (Primary)  - Mammo screening bilateral w 3d and cad; Future  - Liquid-based pap, screening  - Follow up with GI for colonoscopy (already scheduled)  - Follow up in 1 year for annual gyn visit        Jannet Beaver MD  25  4:37 PM

## 2025-04-17 ENCOUNTER — RESULTS FOLLOW-UP (OUTPATIENT)
Dept: OBGYN CLINIC | Facility: CLINIC | Age: 60
End: 2025-04-17

## 2025-04-17 LAB
LAB AP GYN PRIMARY INTERPRETATION: NORMAL
Lab: NORMAL

## 2025-04-18 ENCOUNTER — TELEPHONE (OUTPATIENT)
Dept: OBGYN CLINIC | Facility: CLINIC | Age: 60
End: 2025-04-18

## 2025-04-18 NOTE — TELEPHONE ENCOUNTER
----- Message from Jannet Beaver MD sent at 4/17/2025  8:43 PM EDT -----  Regarding: Normal Labs  Hi all,    Could you call this patient and let her know that her pap smear was normal, and her HPV testing was negative?  She'll be due for her next pap smear in 3 years.    Thanks!    Jannet Beaver MD  OBGYN Resident  04/17/25  8:43 PM

## 2025-05-04 ENCOUNTER — NURSE TRIAGE (OUTPATIENT)
Dept: OTHER | Facility: OTHER | Age: 60
End: 2025-05-04

## 2025-05-04 NOTE — TELEPHONE ENCOUNTER
"FOLLOW UP: please follow up with patient regarding getting procedure rescheduled. She asks that she be called back tomorrow between 12-12:30 because that is when her break is.    REASON FOR CONVERSATION: Procedure    SYMPTOMS: n/a    OTHER: pts colonoscopy was supposed to be on 5/9. Pt got a call that it is tomorrow. Pt needs to reschedule.    DISPOSITION: Home Care        Reason for Disposition   Bowel prep for colonoscopy, questions about    Answer Assessment - Initial Assessment Questions  1. DATE/TIME: \"When are you having your colonoscopy?\"     Pt thought that she was having colonoscopy on 5/9, but got a call today that its scheduled for tomorrow 5/5. Pt not wanting to cancel but wants to know what meds to get.    After talking with patient further patient does want to reschedule procedure. She will not be able to complete the prep today.    Protocols used: Colonoscopy Symptoms and Questions-Adult-AH    "

## 2025-05-04 NOTE — TELEPHONE ENCOUNTER
"Regarding: colonsocopy prep clarification/next day  ----- Message from Marlin SOLIS sent at 5/4/2025  2:20 PM EDT -----  Pt stated, \" I received a message regarding my colonoscopy for tomorrow it was supposed to be for 5/9/25 not 5/5/25, but if I am able to get the medication and get further npo instructions I will continue with the procedure tomorrow\"    Pt did not want to cancel procedure wanted to instead see if she can get prep meds and proceed with procedure    "

## 2025-05-05 RX ORDER — SODIUM CHLORIDE, SODIUM LACTATE, POTASSIUM CHLORIDE, CALCIUM CHLORIDE 600; 310; 30; 20 MG/100ML; MG/100ML; MG/100ML; MG/100ML
20 INJECTION, SOLUTION INTRAVENOUS CONTINUOUS
OUTPATIENT
Start: 2025-05-05

## 2025-05-05 RX ORDER — LIDOCAINE HYDROCHLORIDE 10 MG/ML
0.5 INJECTION, SOLUTION EPIDURAL; INFILTRATION; INTRACAUDAL; PERINEURAL ONCE AS NEEDED
OUTPATIENT
Start: 2025-05-05

## 2025-05-05 RX ORDER — SODIUM CHLORIDE, SODIUM LACTATE, POTASSIUM CHLORIDE, CALCIUM CHLORIDE 600; 310; 30; 20 MG/100ML; MG/100ML; MG/100ML; MG/100ML
125 INJECTION, SOLUTION INTRAVENOUS CONTINUOUS
OUTPATIENT
Start: 2025-05-05

## 2025-05-07 NOTE — TELEPHONE ENCOUNTER
Called and advised pt of all of the below. She verbalized understanding and agreeable. I offered to transfer her to  to schedule but declined as she is currently at work.   Requesting sleep medicine referral be mailed to the address on file.     Clerical,   Can you pls print the sleep medicine referral dated 5/2/25 and mail it to the address on file?    thanks

## 2025-05-07 NOTE — TELEPHONE ENCOUNTER
----- Message from Loni Raya MD sent at 5/2/2025  6:33 PM EDT -----  I know that PCP already reached out (as he had reordered the test) regarding results not formally meeting criteria for sleep apnea and this is true, but I found it in my inbox too and I just wanted to make sure Elizabeth was aware that the sleep doctor did recommend she consider further evaluation with in-lab sleep study due to concern for possible false negative result on the less sensitive home study. As she did have only 14 apneas that night, but more surprising her oxygen went down to 63% and was below 90% oxygen for 217 minutes which is typically much lower than we expect with sleep apnea alone.  She stopped breathing on average 2.5 times per hour which technically  we call normal and sleep apnea requires a diagnosis of 5 apneas per hour, but the in-lab sleep study would do a better job of evaluating especially with the atypical drop in oxygen/hypoxia at baseline most of the night.  I will place another referral to sleep medicine as they recommended to further evaluate if she is interested, I would recommend.

## 2025-05-23 ENCOUNTER — TELEPHONE (OUTPATIENT)
Age: 60
End: 2025-05-23

## 2025-05-23 NOTE — TELEPHONE ENCOUNTER
Pt has R/S her colonoscopy but dose not have the Xavier/Dulc Prep  instructions any longer, she would like them postal mailed to her home please, verified as correct

## 2025-05-23 NOTE — TELEPHONE ENCOUNTER
Scheduled date of EGD/colonoscopy (as of today):06.30.25    Physician performing EGD/colonoscopy:Grace    Location of EGD/colonoscopy[facility]    R/S from 05.05.25

## 2025-06-16 ENCOUNTER — ANESTHESIA EVENT (OUTPATIENT)
Dept: ANESTHESIOLOGY | Facility: HOSPITAL | Age: 60
End: 2025-06-16

## 2025-06-16 ENCOUNTER — ANESTHESIA (OUTPATIENT)
Dept: ANESTHESIOLOGY | Facility: HOSPITAL | Age: 60
End: 2025-06-16

## 2025-06-18 ENCOUNTER — TELEPHONE (OUTPATIENT)
Dept: GASTROENTEROLOGY | Facility: MEDICAL CENTER | Age: 60
End: 2025-06-18

## 2025-06-18 NOTE — TELEPHONE ENCOUNTER
AMERICA and requested call back to confirm upcoming colon/EGD for 6/30/25 with  at [facility].

## (undated) DEVICE — ESOPHAGEAL/COLONIC/BILIARY WIREGUIDED BALLOON DILATATION CATHETER: Brand: CRE™ PRO